# Patient Record
Sex: MALE | Race: WHITE | NOT HISPANIC OR LATINO | Employment: FULL TIME | URBAN - METROPOLITAN AREA
[De-identification: names, ages, dates, MRNs, and addresses within clinical notes are randomized per-mention and may not be internally consistent; named-entity substitution may affect disease eponyms.]

---

## 2018-01-15 NOTE — MISCELLANEOUS
Message  GI Reminder Recall [facility]:   Date: 03/17/2016   Dear Marisa Platt:     Review of our records shows you are due for the following: Colonoscopy  Please call the following office to schedule your appointment:   [address] [facility], [address], [place], [address] [place] [address]  [phone]  We look forward to hearing from you! Sincerely,         Signatures   Electronically signed by :  Katelyn Soriano, ; Mar 17 2016  1:26PM EST                       (Author)

## 2020-12-03 ENCOUNTER — OFFICE VISIT (OUTPATIENT)
Dept: GASTROENTEROLOGY | Facility: CLINIC | Age: 60
End: 2020-12-03
Payer: COMMERCIAL

## 2020-12-03 DIAGNOSIS — Z11.59 SPECIAL SCREENING EXAMINATION FOR VIRAL DISEASE: ICD-10-CM

## 2020-12-03 DIAGNOSIS — Z12.11 COLON CANCER SCREENING: Primary | ICD-10-CM

## 2020-12-03 PROCEDURE — 99204 OFFICE O/P NEW MOD 45 MIN: CPT | Performed by: INTERNAL MEDICINE

## 2020-12-03 RX ORDER — ALBUTEROL SULFATE 90 UG/1
2 AEROSOL, METERED RESPIRATORY (INHALATION) EVERY 4 HOURS PRN
COMMUNITY
Start: 2008-01-23 | End: 2021-03-19

## 2020-12-03 RX ORDER — AMOXICILLIN 500 MG/1
CAPSULE ORAL
COMMUNITY
End: 2021-03-19

## 2021-01-14 ENCOUNTER — TELEPHONE (OUTPATIENT)
Dept: UROLOGY | Facility: MEDICAL CENTER | Age: 61
End: 2021-01-14

## 2021-01-20 ENCOUNTER — TELEPHONE (OUTPATIENT)
Dept: GASTROENTEROLOGY | Facility: CLINIC | Age: 61
End: 2021-01-20

## 2021-01-20 NOTE — TELEPHONE ENCOUNTER
Pt has been rescheduled to 3/24/21 pr request  Elex Pencil date is 3/18/21   Pt expressed understanding

## 2021-01-20 NOTE — TELEPHONE ENCOUNTER
Patients GI provider:  Dr Sang Quevedo    Number to return call: [phone]     Reason for call: Pt calling to r/s his procedure    Scheduled procedure/appointment date if applicable: Procedure - 02/04/21

## 2021-03-17 ENCOUNTER — TELEPHONE (OUTPATIENT)
Dept: GASTROENTEROLOGY | Facility: AMBULARY SURGERY CENTER | Age: 61
End: 2021-03-17

## 2021-03-17 DIAGNOSIS — Z12.11 COLON CANCER SCREENING: Primary | ICD-10-CM

## 2021-03-17 DIAGNOSIS — Z11.59 SPECIAL SCREENING EXAMINATION FOR VIRAL DISEASE: ICD-10-CM

## 2021-03-17 PROCEDURE — U0005 INFEC AGEN DETEC AMPLI PROBE: HCPCS

## 2021-03-17 PROCEDURE — U0003 INFECTIOUS AGENT DETECTION BY NUCLEIC ACID (DNA OR RNA); SEVERE ACUTE RESPIRATORY SYNDROME CORONAVIRUS 2 (SARS-COV-2) (CORONAVIRUS DISEASE [COVID-19]), AMPLIFIED PROBE TECHNIQUE, MAKING USE OF HIGH THROUGHPUT TECHNOLOGIES AS DESCRIBED BY CMS-2020-01-R: HCPCS

## 2021-03-17 NOTE — TELEPHONE ENCOUNTER
LVM that new prep has been sent to his pharmacy and will email new prep instructions for Clenpiq  Gave direct line if has any questions

## 2021-03-17 NOTE — TELEPHONE ENCOUNTER
Patients GI provider:  Dr Christo Lincoln     Number to return call: [phone]    Reason for call: Lien He from [id number] called stated suprep is not covered if ok to switch to Clenpiq       Scheduled procedure/appointment date if applicable: Apt/procedure 3/24/21

## 2021-03-18 LAB — SARS-COV-2 RNA RESP QL NAA+PROBE: NEGATIVE

## 2021-03-19 DIAGNOSIS — Z12.11 COLON CANCER SCREENING: ICD-10-CM

## 2021-03-19 PROCEDURE — U0003 INFECTIOUS AGENT DETECTION BY NUCLEIC ACID (DNA OR RNA); SEVERE ACUTE RESPIRATORY SYNDROME CORONAVIRUS 2 (SARS-COV-2) (CORONAVIRUS DISEASE [COVID-19]), AMPLIFIED PROBE TECHNIQUE, MAKING USE OF HIGH THROUGHPUT TECHNOLOGIES AS DESCRIBED BY CMS-2020-01-R: HCPCS | Performed by: INTERNAL MEDICINE

## 2021-03-19 PROCEDURE — U0005 INFEC AGEN DETEC AMPLI PROBE: HCPCS | Performed by: INTERNAL MEDICINE

## 2021-03-19 NOTE — PRE-PROCEDURE INSTRUCTIONS
Pre-Surgery Instructions:   Medication Instructions    Ibuprofen (Advil) 200 MG CAPS Pt aware to avoid prior to the procedure    Pt to follow Dr Daniel Simmons instructions  Pt to have the repeat covid test today-3/19/21 at the [facility]    will be his daughter Matrha Montague [phone]

## 2021-03-20 LAB — SARS-COV-2 RNA RESP QL NAA+PROBE: NEGATIVE

## 2021-03-22 RX ORDER — SODIUM PICOSULFATE, MAGNESIUM OXIDE, AND ANHYDROUS CITRIC ACID 10; 3.5; 12 MG/160ML; G/160ML; G/160ML
1 LIQUID ORAL ONCE
Qty: 1 BOTTLE | Refills: 0 | Status: SHIPPED | OUTPATIENT
Start: 2021-03-22 | End: 2021-03-22

## 2021-03-23 ENCOUNTER — ANESTHESIA EVENT (OUTPATIENT)
Dept: GASTROENTEROLOGY | Facility: AMBULARY SURGERY CENTER | Age: 61
End: 2021-03-23

## 2021-03-24 ENCOUNTER — ANESTHESIA (OUTPATIENT)
Dept: GASTROENTEROLOGY | Facility: AMBULARY SURGERY CENTER | Age: 61
End: 2021-03-24

## 2021-03-24 ENCOUNTER — HOSPITAL ENCOUNTER (OUTPATIENT)
Dept: GASTROENTEROLOGY | Facility: AMBULARY SURGERY CENTER | Age: 61
Setting detail: OUTPATIENT SURGERY
Discharge: HOME | End: 2021-03-24
Attending: INTERNAL MEDICINE | Admitting: INTERNAL MEDICINE
Payer: COMMERCIAL

## 2021-03-24 VITALS
TEMPERATURE: 96.5 F | HEIGHT: 69 IN | HEART RATE: 48 BPM | WEIGHT: 195 LBS | OXYGEN SATURATION: 98 % | RESPIRATION RATE: 16 BRPM | BODY MASS INDEX: 28.88 KG/M2 | DIASTOLIC BLOOD PRESSURE: 65 MMHG | SYSTOLIC BLOOD PRESSURE: 145 MMHG

## 2021-03-24 DIAGNOSIS — Z12.11 COLON CANCER SCREENING: Primary | ICD-10-CM

## 2021-03-24 PROCEDURE — 88305 TISSUE EXAM BY PATHOLOGIST: CPT | Performed by: PATHOLOGY

## 2021-03-24 PROCEDURE — 45380 COLONOSCOPY AND BIOPSY: CPT | Performed by: INTERNAL MEDICINE

## 2021-03-24 PROCEDURE — 45385 COLONOSCOPY W/LESION REMOVAL: CPT | Performed by: INTERNAL MEDICINE

## 2021-03-24 RX ORDER — PROPOFOL 10 MG/ML
INJECTION, EMULSION INTRAVENOUS CONTINUOUS PRN
Status: DISCONTINUED | OUTPATIENT
Start: 2021-03-24 | End: 2021-03-24

## 2021-03-24 RX ORDER — LIDOCAINE HYDROCHLORIDE 10 MG/ML
INJECTION, SOLUTION EPIDURAL; INFILTRATION; INTRACAUDAL; PERINEURAL AS NEEDED
Status: DISCONTINUED | OUTPATIENT
Start: 2021-03-24 | End: 2021-03-24

## 2021-03-24 RX ORDER — PROPOFOL 10 MG/ML
INJECTION, EMULSION INTRAVENOUS AS NEEDED
Status: DISCONTINUED | OUTPATIENT
Start: 2021-03-24 | End: 2021-03-24

## 2021-03-24 RX ORDER — SODIUM CHLORIDE, SODIUM LACTATE, POTASSIUM CHLORIDE, CALCIUM CHLORIDE 600; 310; 30; 20 MG/100ML; MG/100ML; MG/100ML; MG/100ML
125 INJECTION, SOLUTION INTRAVENOUS CONTINUOUS
Status: DISCONTINUED | OUTPATIENT
Start: 2021-03-24 | End: 2021-03-28 | Stop reason: HOSPADM

## 2021-03-24 RX ORDER — ONDANSETRON 2 MG/ML
4 INJECTION INTRAMUSCULAR; INTRAVENOUS ONCE AS NEEDED
Status: CANCELLED | OUTPATIENT
Start: 2021-03-24

## 2021-03-24 RX ADMIN — LIDOCAINE HYDROCHLORIDE 50 MG: 10 INJECTION, SOLUTION EPIDURAL; INFILTRATION; INTRACAUDAL; PERINEURAL at 12:31

## 2021-03-24 RX ADMIN — PROPOFOL 100 MG: 10 INJECTION, EMULSION INTRAVENOUS at 12:31

## 2021-03-24 RX ADMIN — PROPOFOL 100 MCG/KG/MIN: 10 INJECTION, EMULSION INTRAVENOUS at 12:31

## 2021-03-24 RX ADMIN — SODIUM CHLORIDE, SODIUM LACTATE, POTASSIUM CHLORIDE, AND CALCIUM CHLORIDE 125 ML/HR: .6; .31; .03; .02 INJECTION, SOLUTION INTRAVENOUS at 11:15

## 2021-03-24 NOTE — ANESTHESIA PREPROCEDURE EVALUATION
Procedure:  COLONOSCOPY    Relevant Problems   ANESTHESIA (within normal limits)      CARDIO (within normal limits)      ENDO (within normal limits)      PULMONARY (within normal limits)        Physical Exam    Airway    Mallampati score: II  TM Distance: >3 FB  Neck ROM: full     Dental   No notable dental hx     Cardiovascular  Cardiovascular exam normal    Pulmonary  Pulmonary exam normal     Other Findings        Anesthesia Plan  ASA Score- 1     Anesthesia Type- IV sedation with anesthesia with ASA Monitors  Additional Monitors:   Airway Plan:           Plan Factors-Exercise tolerance (METS): >4 METS  Chart reviewed  Existing labs reviewed  Patient summary reviewed  Patient is not a current smoker  Induction- intravenous  Postoperative Plan-     Informed Consent- Anesthetic plan and risks discussed with patient  I personally reviewed this patient with the CRNA  Discussed and agreed on the Anesthesia Plan with the CRNA  Angy Rodgers

## 2021-03-24 NOTE — PERIOPERATIVE NURSING NOTE
Pt d/c to home at this time w/ Daughter Mariel Waldron  Via: Walking  Pt left with all belongings  Iv was D/C intact with dry sterile dressing  Encouraged to keep follow up appointments, Verbalized understanding  D/C instructions reviewed and explained  Verbalized understanding

## 2021-03-24 NOTE — H&P
History and Physical - [facility]  Fartun Fontaine 63 y o  male MRN: [id number]    HPI: Fartun Fontaine is a 63y o  year old male who presents for colon cancer screening  Review of Systems    Historical Information   Past Medical History:   Diagnosis Date    Arthritis     knees    Colon polyp     Extrinsic asthma      Past Surgical History:   Procedure Laterality Date    BILATERAL HIP ARTHROSCOPY      resurfaced    COLONOSCOPY      HERNIA REPAIR Bilateral     age 4yr   Stephanie Mancuso KNEE ARTHROSCOPY Bilateral     meniscus repair    LASIK      TONSILLECTOMY      WISDOM TOOTH EXTRACTION       Social History   Social History     Substance and Sexual Activity   Alcohol Use Yes    Frequency: 2-4 times a month    Comment: social     Social History     Substance and Sexual Activity   Drug Use Not Currently     Social History     Tobacco Use   Smoking Status Former Smoker   Smokeless Tobacco Never Used   Tobacco Comment    quit 28 yrs ago     Family History   Problem Relation Age of Onset    Bone cancer Father     Prostate cancer Father     Cancer Father         prostate w/mets    COPD Mother         emphysema-smoker       Meds/Allergies     (Not in a hospital admission)      No Known Allergies    Objective     /72   Pulse (!) 52   Temp (!) 96 5 °F (35 8 °C) (Tympanic)   Resp 16   Ht 5' 9" (1 753 m)   Wt 88 5 kg (195 lb)   SpO2 98%   BMI 28 80 kg/m²       PHYSICAL EXAM    Gen: NAD  CV: RRR  CHEST: Clear  ABD: soft, NT/ND  EXT: no edema  Neuro: AAO      ASSESSMENT/PLAN:  This is a 63y o  year old male here for colon cancer screening      PLAN:   Procedure:  Colonoscopy

## 2021-03-24 NOTE — ANESTHESIA POSTPROCEDURE EVALUATION
Post-Op Assessment Note    CV Status:  Stable  Pain Score: 0    Pain management: adequate     Mental Status:  Awake   Hydration Status:  Stable   PONV Controlled:  None   Airway Patency:  Patent      Post Op Vitals Reviewed: Yes      Staff: CRNA         No complications documented      BP      Temp     Pulse    Resp      SpO2

## 2021-03-31 ENCOUNTER — OFFICE VISIT (OUTPATIENT)
Dept: FAMILY MEDICINE CLINIC | Facility: CLINIC | Age: 61
End: 2021-03-31
Payer: COMMERCIAL

## 2021-03-31 VITALS
OXYGEN SATURATION: 98 % | HEART RATE: 58 BPM | SYSTOLIC BLOOD PRESSURE: 132 MMHG | RESPIRATION RATE: 16 BRPM | HEIGHT: 69 IN | TEMPERATURE: 97 F | BODY MASS INDEX: 28.79 KG/M2 | DIASTOLIC BLOOD PRESSURE: 70 MMHG | WEIGHT: 194.4 LBS

## 2021-03-31 DIAGNOSIS — Z11.59 NEED FOR HEPATITIS C SCREENING TEST: ICD-10-CM

## 2021-03-31 DIAGNOSIS — Z90.79 S/P ORCHIECTOMY: ICD-10-CM

## 2021-03-31 DIAGNOSIS — Z13.6 SCREENING FOR CARDIOVASCULAR CONDITION: ICD-10-CM

## 2021-03-31 DIAGNOSIS — Z00.00 WELL ADULT EXAM: Primary | ICD-10-CM

## 2021-03-31 DIAGNOSIS — N52.9 ORGANIC IMPOTENCE: ICD-10-CM

## 2021-03-31 DIAGNOSIS — Z23 NEED FOR VACCINATION: ICD-10-CM

## 2021-03-31 DIAGNOSIS — Z12.5 SCREENING FOR PROSTATE CANCER: ICD-10-CM

## 2021-03-31 DIAGNOSIS — Z11.4 SCREENING FOR HIV (HUMAN IMMUNODEFICIENCY VIRUS): ICD-10-CM

## 2021-03-31 PROBLEM — M16.10 PRIMARY LOCALIZED OSTEOARTHROSIS OF PELVIC REGION: Status: ACTIVE | Noted: 2021-03-31

## 2021-03-31 PROBLEM — M17.10 OSTEOARTHRITIS OF KNEE: Status: ACTIVE | Noted: 2021-03-31

## 2021-03-31 PROBLEM — M17.9 OSTEOARTHRITIS OF KNEE: Status: ACTIVE | Noted: 2021-03-31

## 2021-03-31 PROBLEM — Z12.11 COLON CANCER SCREENING: Status: RESOLVED | Noted: 2020-12-03 | Resolved: 2021-03-31

## 2021-03-31 PROCEDURE — 99386 PREV VISIT NEW AGE 40-64: CPT | Performed by: FAMILY MEDICINE

## 2021-03-31 PROCEDURE — 3725F SCREEN DEPRESSION PERFORMED: CPT | Performed by: FAMILY MEDICINE

## 2021-03-31 PROCEDURE — 3008F BODY MASS INDEX DOCD: CPT | Performed by: FAMILY MEDICINE

## 2021-03-31 NOTE — PATIENT INSTRUCTIONS
Weight Management   AMBULATORY CARE:   Why it is important to manage your weight:  Being overweight increases your risk of health conditions such as heart disease, high blood pressure, type 2 diabetes, and certain types of cancer  It can also increase your risk for osteoarthritis, sleep apnea, and other respiratory problems  Aim for a slow, steady weight loss  Even a small amount of weight loss can lower your risk of health problems  How to lose weight safely:  A safe and healthy way to lose weight is to eat fewer calories and get regular exercise  · You can lose up about 1 pound a week by decreasing the number of calories you eat by 500 calories each day  You can decrease calories by eating smaller portion sizes or by cutting out high-calorie foods  Read labels to find out how many calories are in the foods you eat  · You can also burn calories with exercise such as walking, swimming, or biking  You will be more likely to keep weight off if you make these changes part of your lifestyle  Exercise at least 30 minutes per day on most days of the week  You can also fit in more physical activity by taking the stairs instead of the elevator or parking farther away from stores  Ask your healthcare provider about the best exercise plan for you  Healthy meal plan for weight management:  A healthy meal plan includes a variety of foods, contains fewer calories, and helps you stay healthy  A healthy meal plan includes the following:     · Eat whole-grain foods more often  A healthy meal plan should contain fiber  Fiber is the part of grains, fruits, and vegetables that is not broken down by your body  Whole-grain foods are healthy and provide extra fiber in your diet  Some examples of whole-grain foods are whole-wheat breads and pastas, oatmeal, brown rice, and bulgur  · Eat a variety of vegetables every day  Include dark, leafy greens such as spinach, kale, ana m greens, and mustard greens   Eat yellow and orange vegetables such as carrots, sweet potatoes, and winter squash  · Eat a variety of fruits every day  Choose fresh or canned fruit (canned in its own juice or light syrup) instead of juice  Fruit juice has very little or no fiber  · Eat low-fat dairy foods  Drink fat-free (skim) milk or 1% milk  Eat fat-free yogurt and low-fat cottage cheese  Try low-fat cheeses such as mozzarella and other reduced-fat cheeses  · Choose meat and other protein foods that are low in fat  Choose beans or other legumes such as split peas or lentils  Choose fish, skinless poultry (chicken or turkey), or lean cuts of red meat (beef or pork)  Before you cook meat or poultry, cut off any visible fat  · Use less fat and oil  Try baking foods instead of frying them  Add less fat, such as margarine, sour cream, regular salad dressing and mayonnaise to foods  Eat fewer high-fat foods  Some examples of high-fat foods include french fries, doughnuts, ice cream, and cakes  · Eat fewer sweets  Limit foods and drinks that are high in sugar  This includes candy, cookies, regular soda, and sweetened drinks  Ways to decrease calories:   · Eat smaller portions  ? Use a small plate with smaller servings  ? Do not eat second helpings  ? When you eat at a restaurant, ask for a box and place half of your meal in the box before you eat  ? Share an entrée with someone else  · Replace high-calorie snacks with healthy, low-calorie snacks  ? Choose fresh fruit, vegetables, fat-free rice cakes, or air-popped popcorn instead of potato chips, nuts, or chocolate  ? Choose water or calorie-free drinks instead of soda or sweetened drinks  · Do not shop for groceries when you are hungry  You may be more likely to make unhealthy food choices  Take a grocery list of healthy foods and shop after you have eaten  · Eat regular meals  Do not skip meals  Skipping meals can lead to overeating later in the day   This can make it harder for you to lose weight  Eat a healthy snack in place of a meal if you do not have time to eat a regular meal  Talk with a dietitian to help you create a meal plan and schedule that is right for you  Other things to consider as you try to lose weight:   · Be aware of situations that may give you the urge to overeat, such as eating while watching television  Find ways to avoid these situations  For example, read a book, go for a walk, or do crafts  · Meet with a weight loss support group or friends who are also trying to lose weight  This may help you stay motivated to continue working on your weight loss goals  © Copyright [address] Information is for End User's use only and may not be sold, redistributed or otherwise used for commercial purposes  All illustrations and images included in CareNotes® are the copyrighted property of [employer]  or [facility] Farnaz Muñiz   The above information is an  only  It is not intended as medical advice for individual conditions or treatments  Talk to your doctor, nurse or pharmacist before following any medical regimen to see if it is safe and effective for you

## 2021-03-31 NOTE — PROGRESS NOTES
FNutrition: Stressed importance of a well balanced diet, moderation of sodium/saturated fat, caloric balance and sufficient intake of fiber  Exercise: Stressed the importance of regular exercise with a goal of 150 minutes per week  Dental Health: Discussed daily flossing and brushing and regular dental visits [address]    NAME: Allie Cruz  AGE: 63 y o  SEX: male  : 1960     DATE: 3/31/2021    Assessment and Plan     1  Well adult exam    2  Need for vaccination    3  BMI 28 0-28 9,adult    4  Screening for cardiovascular condition  -     Comprehensive metabolic panel; Future  -     Lipid Panel with Direct LDL reflex; Future    5  Screening for prostate cancer  -     PSA, Total Screen; Future    6  Need for hepatitis C screening test  -     Hepatitis C antibody; Future    7  Screening for HIV (human immunodeficiency virus)  -     [facility]- Human Immunodeficiency Virus 1/2 Antigen / Antibody ( Fourth Generation) with Reflex Testing; Future    8  Organic impotence  -     Testosterone, free, total; Future    9  S/P orchiectomy  -     Testosterone, free, total; Future        · Patient Counseling:   · Nutrition: Stressed importance of a well balanced diet, moderation of sodium/saturated fat, caloric balance and sufficient intake of fiber  · Exercise: Stressed the importance of regular exercise with a goal of 150 minutes per week  · Dental Health: Discussed daily flossing and brushing and regular dental visits     · Immunizations reviewed: Up To Date  · Discussed benefits of:  Colon Cancer Screening, Prostate Cancer Screening  and Screening labs   BMI Counseling: Body mass index is 28 71 kg/m²  Discussed with patient's BMI with him  The BMI is above normal  Exercise recommendations include moderate aerobic physical activity for 150 minutes/week  No follow-ups on file          Chief Complaint     Chief Complaint   Patient presents with    Physical Exam     New patient       History of Present Illness     Pt is here for afull physical  Here for the first time          Well Adult Physical   Patient here for a comprehensive physical exam       Diet and Physical Activity  Diet: well balanced diet  Exercise: frequently      Depression Screen  PHQ-9 Depression Screening    PHQ-9:   Frequency of the following problems over the past two weeks:      Little interest or pleasure in doing things: 0 - not at all  Feeling down, depressed, or hopeless: 0 - not at all  PHQ-2 Score: 0          General Health  Hearing: Normal:  bilateral  Vision: previous LASIK surgery  Dental: regular dental visits    Reproductive Health  No issues       The following portions of the patient's history were reviewed and updated as appropriate: allergies, current medications, past family history, past medical history, past social history, past surgical history and problem list     Review of Systems     Review of Systems   Constitutional: Negative for activity change, appetite change, chills, diaphoresis, fatigue, fever and unexpected weight change  HENT: Negative for congestion, dental problem, ear pain, mouth sores, sinus pressure, sinus pain, sore throat and trouble swallowing  Eyes: Negative for photophobia, discharge and itching  Respiratory: Negative for apnea, chest tightness and shortness of breath  Cardiovascular: Negative for chest pain, palpitations and leg swelling  Gastrointestinal: Negative for abdominal distention, abdominal pain, blood in stool, nausea and vomiting  Endocrine: Negative for cold intolerance, heat intolerance, polydipsia, polyphagia and polyuria  Genitourinary: Negative for difficulty urinating  Musculoskeletal: Negative for arthralgias  Skin: Negative for color change and wound  Neurological: Negative for dizziness, syncope, speech difficulty and headaches     Hematological: Negative for adenopathy  Psychiatric/Behavioral: Negative for agitation and behavioral problems         Past Medical History     Past Medical History:   Diagnosis Date    Arthritis     knees    Colon polyp     Extrinsic asthma     Undescended right testis        Past Surgical History     Past Surgical History:   Procedure Laterality Date    BILATERAL HIP ARTHROSCOPY      resurfaced    COLONOSCOPY      HERNIA REPAIR Bilateral     age 4yr   Schroeder KNEE ARTHROSCOPY Bilateral     meniscus repair    LASIK      ORCHIECTOMY      TONSILLECTOMY      WISDOM TOOTH EXTRACTION         Social History     Social History     Socioeconomic History    Marital status: Single     Spouse name: None    Number of children: None    Years of education: None    Highest education level: None   Occupational History    None   Social Needs    Financial resource strain: None    Food insecurity     Worry: None     Inability: None    Transportation needs     Medical: None     Non-medical: None   Tobacco Use    Smoking status: Former Smoker     Quit date: 1982     Years since quittin 2    Smokeless tobacco: Never Used    Tobacco comment: quit 35 yrs ago   Substance and Sexual Activity    Alcohol use: Yes     Frequency: 2-4 times a month     Comment: social    Drug use: Not Currently    Sexual activity: None   Lifestyle    Physical activity     Days per week: None     Minutes per session: None    Stress: None   Relationships    Social connections     Talks on phone: None     Gets together: None     Attends Orthodox service: None     Active member of club or organization: None     Attends meetings of clubs or organizations: None     Relationship status: None    Intimate partner violence     Fear of current or ex partner: None     Emotionally abused: None     Physically abused: None     Forced sexual activity: None   Other Topics Concern    None   Social History Narrative    None       Family History     Family History   Problem Relation Age of Onset    Bone cancer Father     Prostate cancer Father     Cancer Father         prostate w/mets    COPD Mother         emphysema-smoker    Hypertension Mother     Mental illness Neg Hx        Current Medications       Current Outpatient Medications:     Ibuprofen (Advil) 200 MG CAPS, as needed , Disp: , Rfl:      Allergies     No Known Allergies    Objective     /70   Pulse 58   Temp (!) 97 °F (36 1 °C)   Resp 16   Ht 5' 9" (1 753 m)   Wt 88 2 kg (194 lb 6 4 oz)   SpO2 98%   BMI 28 71 kg/m²      Physical Exam  Vitals signs and nursing note reviewed  Constitutional:       General: He is not in acute distress  Appearance: He is well-developed  He is not diaphoretic  HENT:      Head: Normocephalic and atraumatic  Right Ear: External ear normal       Left Ear: External ear normal       Nose: Nose normal       Mouth/Throat:      Pharynx: No oropharyngeal exudate  Eyes:      General: No scleral icterus  Right eye: No discharge  Left eye: No discharge  Pupils: Pupils are equal, round, and reactive to light  Neck:      Thyroid: No thyromegaly  Cardiovascular:      Rate and Rhythm: Normal rate  Heart sounds: Normal heart sounds  No murmur  Pulmonary:      Effort: Pulmonary effort is normal  No respiratory distress  Breath sounds: Normal breath sounds  No wheezing  Abdominal:      General: Bowel sounds are normal  There is no distension  Palpations: Abdomen is soft  There is no mass  Tenderness: There is no abdominal tenderness  There is no guarding or rebound  Genitourinary:     Prostate: Normal    Musculoskeletal: Normal range of motion  Skin:     General: Skin is warm and dry  Findings: No erythema or rash  Neurological:      Mental Status: He is alert        Coordination: Coordination normal       Deep Tendon Reflexes: Reflexes normal    Psychiatric:         Behavior: Behavior normal  Visual Acuity Screening    Right eye Left eye Both eyes   Without correction: 20/30 20/25 20/25   With correction:              Shannon Crandall, DO  [address]  BMI Counseling: Body mass index is 28 71 kg/m²  The BMI is above normal  Nutrition recommendations include reducing portion sizes

## 2021-04-15 LAB
ALBUMIN SERPL-MCNC: 4.3 G/DL (ref 3.8–4.8)
ALBUMIN/GLOB SERPL: 1.9 {RATIO} (ref 1.2–2.2)
ALP SERPL-CCNC: 74 IU/L (ref 39–117)
ALT SERPL-CCNC: 17 IU/L (ref 0–44)
AST SERPL-CCNC: 21 IU/L (ref 0–40)
BILIRUB SERPL-MCNC: 0.7 MG/DL (ref 0–1.2)
BUN SERPL-MCNC: 10 MG/DL (ref 8–27)
BUN/CREAT SERPL: 10 (ref 10–24)
CALCIUM SERPL-MCNC: 9.6 MG/DL (ref 8.6–10.2)
CHLORIDE SERPL-SCNC: 103 MMOL/L (ref 96–106)
CHOLEST SERPL-MCNC: 215 MG/DL (ref 100–199)
CO2 SERPL-SCNC: 23 MMOL/L (ref 20–29)
CREAT SERPL-MCNC: 0.98 MG/DL (ref 0.76–1.27)
GLOBULIN SER-MCNC: 2.3 G/DL (ref 1.5–4.5)
GLUCOSE SERPL-MCNC: 95 MG/DL (ref 65–99)
HCV AB S/CO SERPL IA: <0.1 S/CO RATIO (ref 0–0.9)
HDLC SERPL-MCNC: 39 MG/DL
HIV 1+2 AB+HIV1 P24 AG SERPL QL IA: NON REACTIVE
LDLC SERPL CALC-MCNC: 144 MG/DL (ref 0–99)
MICRODELETION SYND BLD/T FISH: NORMAL
POTASSIUM SERPL-SCNC: 4.9 MMOL/L (ref 3.5–5.2)
PROT SERPL-MCNC: 6.6 G/DL (ref 6–8.5)
PSA SERPL-MCNC: 3.7 NG/ML (ref 0–4)
SL AMB EGFR AFRICAN AMERICAN: 96 ML/MIN/1.73
SL AMB EGFR NON AFRICAN AMERICAN: 83 ML/MIN/1.73
SODIUM SERPL-SCNC: 138 MMOL/L (ref 134–144)
TRIGL SERPL-MCNC: 175 MG/DL (ref 0–149)

## 2021-08-25 ENCOUNTER — AMB VIDEO VISIT (OUTPATIENT)
Dept: OTHER | Facility: HOSPITAL | Age: 61
End: 2021-08-25

## 2021-08-25 PROCEDURE — ECARE PR SL URGENT CARE VIRTUAL VISIT: Performed by: FAMILY MEDICINE

## 2021-08-25 NOTE — CARE ANYWHERE EVISITS
Visit Summary for Jules Fisher - Gender: Male - Date of Birth: [id number]  Date: [id number] - Duration: 6 minutes  Patient: Jules Fisher  Provider: Abby Ramos    Patient Contact Information  Address  [place]  [phone]    Visit Topics  Tick or spider bite on leg [Added By: Self - 2021-08-25]    Triage Questions   What is your current physical address in the event of a medical emergency? Answer []  Are you allergic to any medications? Answer []  Are you now or could you be pregnant? Answer []  Do you have any immune system compromise or chronic lung   disease? Answer []  Do you have any vulnerable family members in the home (infant, pregnant, cancer, elderly)? Answer []     Conversation Transcripts  [0A][0A] [Notification] Westley Vargas, Global Staff, will help you prepare for your visit  She is assisting Johnny Vazquez Family Physician [0A][Saumya Navarro] Lan, and thank you for connecting  While you are waiting for the doctor, are there any questions I   can answer for you about our service? Please contact customer service if you have questions about billing, insurance, or technical issues  Visits work best with a stable WiFi connection, so please make sure you are connected before we   begin [0A][Notification] Westley Vargas has left the room  [0A][Notification] You are connected with Abby Ramos Family Physician [0A][Notification] Jules Fisher is located in [place]  [0A][Notification] Jules Fisher has shared health   history  Iraidaeugenia Gottlieb  [0A][Notification] Abby Ramos has added a prescription  [0A]    Diagnosis  Furuncle of left lower limb    Procedures  Value: [id number] Code: CPT-4 UNLISTED E&M SERVICE    Medications Prescribed    cephalexin  Dose : 1 capsule  Route : oral  Frequency : every 12 hours       Refills : 0  Instructions to the Pharmacist : Substitutions allowed    mupirocin      Frequency :   Patient Instructions : Apply to affected area 2 times a day until healed  Refills : 0  Instructions to the Pharmacist : Substitutions allowed      Provider Notes  [0A][0A] We strongly encourage you to share the[0A]following record of today's visit with your primary care physician  [0A][0A][0A][0A]Contact phone number: see intake[0A][0A]Mode of Communication: video[0A][place] state, name and    confirmed[0A][0A][0A][0A]HPI:   62 yo male  states that 4 days ago noticed a pimple on his left upper medial thigh> he thought ingrown hair or a bite  It expanded in redness and size and pain  Used warm compresses and last night able to get a bit of   pus out  He states today it is still warm to touch and tender, better today overall in size and less swelling  No fever  He thought maybe fever the other day, felt weak  PMH: none[0A][0A]PSH: none recent[0A][0A]Meds: none[0A][0A]Allergies:   nkda[0A]nonsmoker[0A]covid vaccine - yes[0A]Exam: [0A][0A]Gen: Alert, normal mental status and interaction, no visible distress, non-[0A]toxic appearance  left medial upper thigh with 4cm x 10cm angry deep patch of erythema with central open   furuncle[0A][0A][0A][0A]Assessment: L02 426 Furuncle of left lower limb, medial left thigh[0A]Plan: [0A][0A]1  I am sending you a prescription as described below  cephelexin bid x 10 daysmuprirocin ointment [0A][0A]2  Discussed precautions  Wash only   with water  Keep covered if draining or open  otc pain relievers according to label[0A][0A][0A][0A]Follow up:[0A][0A]1  If there are any questions or problems with the prescription, call[0A][phone] anytime for assistance  [0A][0A]2  Please   re-connect for another online visit or see an in-person provider[0A]should your symptoms worsen or persist  [0A][0A]3  Taking a probiotic (either in pill form or by eating yogurt that contains[0A]probiotics) while using antibiotics can help prevent some   of the troublesome[0A]side effects that antibiotics can sometimes cause [0A][0A]4   Please print a copy of this note and send it to your regular doctor, or take[0A]it to your next visit so it may be included in your medical record  [0A][0A][0A][0A]Patient   voiced understanding and agrees to plan [0A][0A][0A][0A]Please see your PCP on an annual basis  [0A]    Electronically signed by: Lee Fuentes [phone])

## 2021-08-27 ENCOUNTER — OFFICE VISIT (OUTPATIENT)
Dept: URGENT CARE | Facility: CLINIC | Age: 61
End: 2021-08-27
Payer: COMMERCIAL

## 2021-08-27 VITALS
TEMPERATURE: 98.3 F | BODY MASS INDEX: 29.33 KG/M2 | DIASTOLIC BLOOD PRESSURE: 74 MMHG | WEIGHT: 198 LBS | HEIGHT: 69 IN | HEART RATE: 67 BPM | OXYGEN SATURATION: 98 % | SYSTOLIC BLOOD PRESSURE: 135 MMHG | RESPIRATION RATE: 18 BRPM

## 2021-08-27 DIAGNOSIS — L02.416 ABSCESS OF LEFT THIGH: Primary | ICD-10-CM

## 2021-08-27 PROCEDURE — 99213 OFFICE O/P EST LOW 20 MIN: CPT | Performed by: PHYSICIAN ASSISTANT

## 2021-08-27 RX ORDER — SULFAMETHOXAZOLE AND TRIMETHOPRIM 800; 160 MG/1; MG/1
1 TABLET ORAL EVERY 12 HOURS SCHEDULED
Qty: 20 TABLET | Refills: 0 | Status: SHIPPED | OUTPATIENT
Start: 2021-08-27 | End: 2021-09-06

## 2021-08-27 RX ORDER — CEPHALEXIN 500 MG/1
500 CAPSULE ORAL EVERY 6 HOURS SCHEDULED
COMMUNITY
End: 2021-09-09 | Stop reason: ALTCHOICE

## 2021-08-27 NOTE — PATIENT INSTRUCTIONS
Abscess   WHAT YOU NEED TO KNOW:   A warm compress may help your abscess drain  Your healthcare provider may make a cut in the abscess so it can drain  You may need surgery to remove an abscess that is on your hands or buttocks  DISCHARGE INSTRUCTIONS:   Return to the emergency department if:   · The area around your abscess becomes very painful, warm, or has red streaks  · You have a fever and chills  · Your heart is beating faster than usual     · You feel faint or confused  Call your doctor if:   · Your abscess gets bigger or does not get better  · Your abscess returns  · You have questions or concerns about your condition or care  Medicines: You may  need any of the following:  · Antibiotics  help treat a bacterial infection  · Acetaminophen  decreases pain and fever  It is available without a doctor's order  Ask how much to take and how often to take it  Follow directions  Read the labels of all other medicines you are using to see if they also contain acetaminophen, or ask your doctor or pharmacist  Acetaminophen can cause liver damage if not taken correctly  Do not use more than 4 grams (4,000 milligrams) total of acetaminophen in one day  · NSAIDs , such as ibuprofen, help decrease swelling, pain, and fever  This medicine is available with or without a doctor's order  NSAIDs can cause stomach bleeding or kidney problems in certain people  If you take blood thinner medicine, always ask your healthcare provider if NSAIDs are safe for you  Always read the medicine label and follow directions  · Take your medicine as directed  Contact your healthcare provider if you think your medicine is not helping or if you have side effects  Tell him or her if you are allergic to any medicine  Keep a list of the medicines, vitamins, and herbs you take  Include the amounts, and when and why you take them  Bring the list or the pill bottles to follow-up visits   Carry your medicine list with you in case of an emergency  Self-care:   · Apply a warm compress to your abscess  This will help it open and drain  Wet a washcloth in warm, but not hot, water  Apply the compress for 10 minutes  Repeat this 4 times each day  Do not  press on an abscess or try to open it with a needle  You may push the bacteria deeper or into your blood  · Do not share your clothes, towels, or sheets with anyone  This can spread the infection to others  · Wash your hands often  This can help prevent the spread of germs  Use soap and water or an alcohol-based hand rub  Care for your wound after it is drained:   · Care for your wound as directed  If your healthcare provider says it is okay, carefully remove the bandage and gauze packing  You may need to soak the gauze to get it out of your wound  Clean your wound and the area around it as directed  Dry the area and put on new, clean bandages  Change your bandages when they get wet or dirty  · Ask your healthcare provider how to change the gauze in your wound  Keep track of how many pieces of gauze are placed inside the wound  Do not put too much packing in the wound  Do not pack the gauze too tightly in your wound  Follow up with your healthcare provider in 1 to 3 days: You may need to have your packing removed or your bandage changed  Write down your questions so you remember to ask them during your visits  © [employer] 2021 Information is for End User's use only and may not be sold, redistributed or otherwise used for commercial purposes  All illustrations and images included in CareNotes® are the copyrighted property of [employer]  or [facility] Farnaz Muñiz   The above information is an  only  It is not intended as medical advice for individual conditions or treatments  Talk to your doctor, nurse or pharmacist before following any medical regimen to see if it is safe and effective for you

## 2021-08-27 NOTE — PROGRESS NOTES
[facility] [facility]        NAME: Melissa Thomas is a 63 y o  male  : 1960    MRN: [id number]  DATE:  2021  TIME: 5:25 PM    Assessment and Plan   Abscess of left thigh [L02 416]  1  Abscess of left thigh  sulfamethoxazole-trimethoprim (BACTRIM DS) 800-160 mg per tablet         Patient Instructions     Patient has persistent abscess of the left thigh despite treatment with Keflex and antibiotic ointment  I will add Bactrim to his regimen so he is cover for both MRSA and non MRSA staph  Recommended warm compresses and otherwise keeping the affected area clean, dressed, dry  Should be re-evaluated if condition continues to persist   Follow up with PCP in 3-5 days  Proceed to  ER if symptoms worsen  Chief Complaint     Chief Complaint   Patient presents with    Recurrent Skin Infections     boil on left upper thich did an telemedicine visit put on antibiotic and ointment states it doesn't look better  Red and swollen          History of Present Illness         Patient presents with what he reports is an abscess of his left thigh which has been undergoing treatment but does not appear to be improving  He was prescribed Keflex and has been applying antibiotic ointment since he had telemedicine visit but it is not decreasing in size  He reports redness, tenderness, and swelling but denies any active bleeding or drainage, fever, chills, red streaking, or any other significant symptoms  Review of Systems   Review of Systems   Constitutional: Negative  Respiratory: Negative  Cardiovascular: Negative  Gastrointestinal: Negative  Genitourinary: Negative  Skin: Positive for wound (  Abscess left thigh)  Current Medications     No current outpatient medications on file      Current Allergies     Allergies as of 2021    (No Known Allergies)            The following portions of the patient's history were reviewed and updated as appropriate: allergies, current medications, past family history, past medical history, past social history, past surgical history and problem list      Past Medical History:   Diagnosis Date    Arthritis     knees    Colon polyp     Extrinsic asthma     Undescended right testis        Past Surgical History:   Procedure Laterality Date    BILATERAL HIP ARTHROSCOPY      resurfaced    COLONOSCOPY      HERNIA REPAIR Bilateral     age 4yr   Gema Darke KNEE ARTHROSCOPY Bilateral     meniscus repair    LASIK      ORCHIECTOMY      TONSILLECTOMY      WISDOM TOOTH EXTRACTION         Family History   Problem Relation Age of Onset    Bone cancer Father     Prostate cancer Father     Cancer Father         prostate w/mets    COPD Mother         emphysema-smoker    Hypertension Mother     Mental illness Neg Hx          Medications have been verified  Objective   /74   Pulse 67   Temp 98 3 °F (36 8 °C)   Resp 18   Ht 5' 9" (1 753 m)   Wt 89 8 kg (198 lb)   SpO2 98%   BMI 29 24 kg/m²   No LMP for male patient  Physical Exam     Physical Exam  Vitals reviewed  Constitutional:       General: He is not in acute distress  Appearance: He is well-developed  Skin:     Comments: Left proximal thigh with nondraining, tender abscess present  No active bleeding  No red streaking proximal to area of concern  Neurological:      Mental Status: He is alert and oriented to person, place, and time

## 2021-08-28 ENCOUNTER — OFFICE VISIT (OUTPATIENT)
Dept: FAMILY MEDICINE CLINIC | Facility: CLINIC | Age: 61
End: 2021-08-28
Payer: COMMERCIAL

## 2021-08-28 VITALS
DIASTOLIC BLOOD PRESSURE: 60 MMHG | HEIGHT: 69 IN | BODY MASS INDEX: 29.18 KG/M2 | WEIGHT: 197 LBS | TEMPERATURE: 97.7 F | HEART RATE: 76 BPM | SYSTOLIC BLOOD PRESSURE: 116 MMHG | RESPIRATION RATE: 16 BRPM

## 2021-08-28 DIAGNOSIS — L02.91 ABSCESS: Primary | ICD-10-CM

## 2021-08-28 PROCEDURE — 10060 I&D ABSCESS SIMPLE/SINGLE: CPT | Performed by: FAMILY MEDICINE

## 2021-08-28 PROCEDURE — 99213 OFFICE O/P EST LOW 20 MIN: CPT | Performed by: FAMILY MEDICINE

## 2021-08-28 PROCEDURE — 3008F BODY MASS INDEX DOCD: CPT | Performed by: FAMILY MEDICINE

## 2021-08-28 RX ORDER — AMOXICILLIN 500 MG/1
CAPSULE ORAL
COMMUNITY
End: 2021-09-16

## 2021-08-28 NOTE — PROGRESS NOTES
Assessment/Plan:    1  Abscess            There are no Patient Instructions on file for this visit  Return in about 10 days (around 9/7/2021) for Recheck  Subjective:      Patient ID: Makayla Foster is a 63 y o  male  Chief Complaint   Patient presents with    Recurrent Skin Infections     ac/lpn       Pt being seen for a  urgent care follow up for an abscess on his leg    Pt states he started with a pimple on left inner thigh, few days later it was the size of a football  States he did a tele visit - he was given an abx with a cream  Pt states he figured he was off yesterday and went to urgent care - he was advised the lesion could be a brown recluse spider or MRSA - he was given different abx  PT states it was not cut open he was advised to come back in ten days - this was yesterday  Pt states since starting the new abx it is about the same, started it yesreday      The following portions of the patient's history were reviewed and updated as appropriate: allergies, current medications, past family history, past medical history, past social history, past surgical history and problem list     Review of Systems   Constitutional: Negative for activity change, appetite change, chills, diaphoresis, fatigue, fever and unexpected weight change  HENT: Negative for congestion, dental problem, ear pain, mouth sores, sinus pressure, sinus pain, sore throat and trouble swallowing  Eyes: Negative for photophobia, discharge and itching  Respiratory: Negative for apnea, chest tightness and shortness of breath  Cardiovascular: Negative for chest pain, palpitations and leg swelling  Gastrointestinal: Negative for abdominal distention, abdominal pain, blood in stool, nausea and vomiting  Endocrine: Negative for cold intolerance, heat intolerance, polydipsia, polyphagia and polyuria  Genitourinary: Negative for difficulty urinating  Musculoskeletal: Negative for arthralgias     Skin: Negative for color change and wound  Large indurated are in left upper thigh  Red swollen warm tender   Neurological: Negative for dizziness, syncope, speech difficulty and headaches  Hematological: Negative for adenopathy  Psychiatric/Behavioral: Negative for agitation and behavioral problems  Current Outpatient Medications   Medication Sig Dispense Refill    amoxicillin (AMOXIL) 500 mg capsule amoxicillin 500 mg capsule   Take 4 capsules by oral route one hour prior to dental procedures   cephalexin (KEFLEX) 500 mg capsule Take 500 mg by mouth every 6 (six) hours      Ibuprofen (Advil) 200 MG CAPS as needed       mupirocin (BACTROBAN) 2 % ointment       sulfamethoxazole-trimethoprim (BACTRIM DS) 800-160 mg per tablet Take 1 tablet by mouth every 12 (twelve) hours for 10 days Take with food  20 tablet 0     No current facility-administered medications for this visit  Objective:    /60   Pulse 76   Temp 97 7 °F (36 5 °C)   Resp 16   Ht 5' 9" (1 753 m)   Wt 89 4 kg (197 lb)   BMI 29 09 kg/m²        Physical Exam  Vitals and nursing note reviewed  Constitutional:       General: He is not in acute distress  Appearance: He is well-developed  He is not diaphoretic  HENT:      Head: Normocephalic and atraumatic  Right Ear: External ear normal       Left Ear: External ear normal       Nose: Nose normal       Mouth/Throat:      Pharynx: No oropharyngeal exudate  Eyes:      General: No scleral icterus  Right eye: No discharge  Left eye: No discharge  Pupils: Pupils are equal, round, and reactive to light  Neck:      Thyroid: No thyromegaly  Cardiovascular:      Rate and Rhythm: Normal rate  Heart sounds: Normal heart sounds  No murmur heard  Pulmonary:      Effort: Pulmonary effort is normal  No respiratory distress  Breath sounds: Normal breath sounds  No wheezing     Abdominal:      General: Bowel sounds are normal  There is no distension  Palpations: Abdomen is soft  There is no mass  Tenderness: There is no abdominal tenderness  There is no guarding or rebound  Musculoskeletal:         General: Normal range of motion  Skin:     General: Skin is warm and dry  Findings: No erythema or rash  Comments: Large indurated warm marlo in left inner upper thigh   Neurological:      Mental Status: He is alert  Coordination: Coordination normal       Deep Tendon Reflexes: Reflexes normal    Psychiatric:         Behavior: Behavior normal               Incision and Drainage    Date/Time: 8/28/2021 11:46 AM  Performed by: Rosalia Prabhakar DO  Authorized by: Rosalia Prabhakar DO   Universal Protocol:  Consent: Verbal consent obtained  Consent given by: patient  Time out: Immediately prior to procedure a "time out" was called to verify the correct patient, procedure, equipment, support staff and site/side marked as required  Timeout called at: 8/28/2021 11:46 AM   Patient understanding: patient states understanding of the procedure being performed  Patient consent: the patient's understanding of the procedure matches consent given  Procedure consent: procedure consent matches procedure scheduled  Relevant documents: relevant documents present and verified  Test results: test results available and properly labeled  Site marked: the operative site was marked  Radiology Images displayed and confirmed  If images not available, report reviewed: imaging studies available  Required items: required blood products, implants, devices, and special equipment available  Patient identity confirmed: verbally with patient      Patient location:  Clinic  Location:     Type:  Abscess    Size:  15    Location:  Lower extremity    Lower extremity location:  L leg  Pre-procedure details:     Skin preparation:  Antiseptic wash  Anesthesia (see MAR for exact dosages):      Anesthesia method:  Local infiltration    Local anesthetic:  Lidocaine 1% w/o epi  Procedure details:     Complexity:  Simple    Needle aspiration: no      Incision types:  Stab incision    Scalpel blade:  10    Approach:  Open    Incision depth:  Subcutaneous    Wound management:  Probed and deloculated    Drainage:  Bloody    Drainage amount:  Scant    Wound treatment:  Wound left open    Packing materials:  None  Post-procedure details:     Patient tolerance of procedure:   Tolerated well, no immediate complications    Complication (if applicable):  None        Easton Noss, DO

## 2021-09-09 ENCOUNTER — OFFICE VISIT (OUTPATIENT)
Dept: FAMILY MEDICINE CLINIC | Facility: CLINIC | Age: 61
End: 2021-09-09
Payer: COMMERCIAL

## 2021-09-09 VITALS
RESPIRATION RATE: 16 BRPM | HEIGHT: 69 IN | SYSTOLIC BLOOD PRESSURE: 120 MMHG | HEART RATE: 64 BPM | TEMPERATURE: 98.2 F | WEIGHT: 200 LBS | DIASTOLIC BLOOD PRESSURE: 70 MMHG | BODY MASS INDEX: 29.62 KG/M2

## 2021-09-09 DIAGNOSIS — L02.416 ABSCESS OF LEFT THIGH: Primary | ICD-10-CM

## 2021-09-09 PROCEDURE — 99213 OFFICE O/P EST LOW 20 MIN: CPT | Performed by: FAMILY MEDICINE

## 2021-09-09 RX ORDER — CLINDAMYCIN HYDROCHLORIDE 300 MG/1
300 CAPSULE ORAL 3 TIMES DAILY
Qty: 30 CAPSULE | Refills: 0 | Status: SHIPPED | OUTPATIENT
Start: 2021-09-09 | End: 2021-09-19

## 2021-09-09 NOTE — PROGRESS NOTES
Assessment/Plan:    1  Abscess of left thigh  -     clindamycin (CLEOCIN) 300 MG capsule; Take 1 capsule (300 mg total) by mouth 3 (three) times a day for 10 days  -     Lyme Total Antibody Profile with reflex to WB; Future            There are no Patient Instructions on file for this visit  Return in about 5 days (around 9/14/2021) for Recheck 30 min appt  Subjective:      Patient ID: Baljit Soto is a 63 y o  male  Chief Complaint   Patient presents with    Follow-up     sore on left leg,  ac/lpn       Pt is here to follow up abscess on left upper thigh  Done with abx - looks a ton better but still present  And is still red, still warm still somewhat tender          The following portions of the patient's history were reviewed and updated as appropriate: allergies, current medications, past family history, past medical history, past social history, past surgical history and problem list     Review of Systems   Constitutional: Negative for activity change, appetite change, chills, diaphoresis, fatigue, fever and unexpected weight change  HENT: Negative for congestion, dental problem, ear pain, mouth sores, sinus pressure, sinus pain, sore throat and trouble swallowing  Eyes: Negative for photophobia, discharge and itching  Respiratory: Negative for apnea, chest tightness and shortness of breath  Cardiovascular: Negative for chest pain, palpitations and leg swelling  Gastrointestinal: Negative for abdominal distention, abdominal pain, blood in stool, nausea and vomiting  Endocrine: Negative for cold intolerance, heat intolerance, polydipsia, polyphagia and polyuria  Genitourinary: Negative for difficulty urinating  Musculoskeletal: Negative for arthralgias  Skin: Positive for rash  Negative for color change and wound  Swollen tender area   Neurological: Negative for dizziness, syncope, speech difficulty and headaches  Hematological: Negative for adenopathy  Psychiatric/Behavioral: Negative for agitation and behavioral problems  Current Outpatient Medications   Medication Sig Dispense Refill    amoxicillin (AMOXIL) 500 mg capsule amoxicillin 500 mg capsule   Take 4 capsules by oral route one hour prior to dental procedures   Ibuprofen (Advil) 200 MG CAPS as needed       clindamycin (CLEOCIN) 300 MG capsule Take 1 capsule (300 mg total) by mouth 3 (three) times a day for 10 days 30 capsule 0     No current facility-administered medications for this visit  Objective:    /70   Pulse 64   Temp 98 2 °F (36 8 °C)   Resp 16   Ht 5' 9" (1 753 m)   Wt 90 7 kg (200 lb)   BMI 29 53 kg/m²        Physical Exam  Vitals and nursing note reviewed  Constitutional:       General: He is not in acute distress  Appearance: He is well-developed  He is not diaphoretic  HENT:      Head: Normocephalic and atraumatic  Right Ear: External ear normal       Left Ear: External ear normal       Nose: Nose normal       Mouth/Throat:      Pharynx: No oropharyngeal exudate  Eyes:      General: No scleral icterus  Right eye: No discharge  Left eye: No discharge  Pupils: Pupils are equal, round, and reactive to light  Neck:      Thyroid: No thyromegaly  Cardiovascular:      Rate and Rhythm: Normal rate  Heart sounds: Normal heart sounds  No murmur heard  Pulmonary:      Effort: Pulmonary effort is normal  No respiratory distress  Breath sounds: Normal breath sounds  No wheezing  Abdominal:      General: Bowel sounds are normal  There is no distension  Palpations: Abdomen is soft  There is no mass  Tenderness: There is no abdominal tenderness  There is no guarding or rebound  Musculoskeletal:         General: Normal range of motion  Skin:     General: Skin is warm and dry  Findings: Rash present  No erythema        Comments: Swollen red tender indurated left inner thigh   Neurological:      Mental Status: He is alert        Coordination: Coordination normal       Deep Tendon Reflexes: Reflexes normal    Psychiatric:         Behavior: Behavior normal                 Jhon Whiteside, DO

## 2021-09-16 ENCOUNTER — OFFICE VISIT (OUTPATIENT)
Dept: FAMILY MEDICINE CLINIC | Facility: CLINIC | Age: 61
End: 2021-09-16
Payer: COMMERCIAL

## 2021-09-16 VITALS
HEART RATE: 61 BPM | BODY MASS INDEX: 28.88 KG/M2 | SYSTOLIC BLOOD PRESSURE: 126 MMHG | OXYGEN SATURATION: 97 % | TEMPERATURE: 97 F | WEIGHT: 195 LBS | HEIGHT: 69 IN | DIASTOLIC BLOOD PRESSURE: 68 MMHG | RESPIRATION RATE: 18 BRPM
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
## 2021-09-16 DIAGNOSIS — L02.416 ABSCESS OF LEFT THIGH: Primary | ICD-10-CM

## 2021-09-16 PROCEDURE — 3008F BODY MASS INDEX DOCD: CPT | Performed by: FAMILY MEDICINE

## 2021-09-16 PROCEDURE — 99213 OFFICE O/P EST LOW 20 MIN: CPT | Performed by: FAMILY MEDICINE

## 2021-09-16 PROCEDURE — 1036F TOBACCO NON-USER: CPT | Performed by: FAMILY MEDICINE

## 2021-09-16 NOTE — PROGRESS NOTES
Assessment/Plan:    1  Abscess of left thigh    2  Tick bite, initial encounter  -     Lyme Total Antibody Profile with reflex to WB; Future            There are no Patient Instructions on file for this visit  Return for Recheck  Subjective:      Patient ID: Clare Callaway is a 63 y o  male  Chief Complaint   Patient presents with    Follow-up     leg   sas/cma       Pt is here to follow up abscess on left upper thigh  Area is improved less red etc  Pt feels better      Pt dopes states he has had multiple tick bites and would like to be tested for lyme      The following portions of the patient's history were reviewed and updated as appropriate: allergies, current medications, past family history, past medical history, past social history, past surgical history and problem list     Review of Systems   Constitutional: Negative for activity change, appetite change, chills, diaphoresis, fatigue, fever and unexpected weight change  HENT: Negative for congestion, dental problem, ear pain, mouth sores, sinus pressure, sinus pain, sore throat and trouble swallowing  Eyes: Negative for photophobia, discharge and itching  Respiratory: Negative for apnea, chest tightness and shortness of breath  Cardiovascular: Negative for chest pain, palpitations and leg swelling  Gastrointestinal: Negative for abdominal distention, abdominal pain, blood in stool, nausea and vomiting  Endocrine: Negative for cold intolerance, heat intolerance, polydipsia, polyphagia and polyuria  Genitourinary: Negative for difficulty urinating  Musculoskeletal: Negative for arthralgias  Skin: Positive for rash  Negative for color change and wound  Neurological: Negative for dizziness, syncope, speech difficulty and headaches  Hematological: Negative for adenopathy  Psychiatric/Behavioral: Negative for agitation and behavioral problems           Current Outpatient Medications   Medication Sig Dispense Refill    clindamycin (CLEOCIN) 300 MG capsule Take 1 capsule (300 mg total) by mouth 3 (three) times a day for 10 days 30 capsule 0     No current facility-administered medications for this visit  Objective:    /68   Pulse 61   Temp (!) 97 °F (36 1 °C)   Resp 18   Ht 5' 9" (1 753 m)   Wt 88 5 kg (195 lb)   SpO2 97%   BMI 28 80 kg/m²        Physical Exam  Vitals and nursing note reviewed  Constitutional:       General: He is not in acute distress  Appearance: He is well-developed  He is not diaphoretic  HENT:      Head: Normocephalic and atraumatic  Right Ear: External ear normal       Left Ear: External ear normal       Nose: Nose normal       Mouth/Throat:      Pharynx: No oropharyngeal exudate  Eyes:      General: No scleral icterus  Right eye: No discharge  Left eye: No discharge  Pupils: Pupils are equal, round, and reactive to light  Neck:      Thyroid: No thyromegaly  Cardiovascular:      Rate and Rhythm: Normal rate  Heart sounds: Normal heart sounds  No murmur heard  Pulmonary:      Effort: Pulmonary effort is normal  No respiratory distress  Breath sounds: Normal breath sounds  No wheezing  Abdominal:      General: Bowel sounds are normal  There is no distension  Palpations: Abdomen is soft  There is no mass  Tenderness: There is no abdominal tenderness  There is no guarding or rebound  Musculoskeletal:         General: Normal range of motion  Skin:     General: Skin is warm and dry  Findings: No erythema or rash  Comments: Pt with improver erythema on left upper thigh  Less indurated  No wound   Neurological:      Mental Status: He is alert        Coordination: Coordination normal       Deep Tendon Reflexes: Reflexes normal    Psychiatric:         Behavior: Behavior normal                 Francee Chris, DO

## 2022-06-13 ENCOUNTER — OFFICE VISIT (OUTPATIENT)
Dept: FAMILY MEDICINE CLINIC | Facility: CLINIC | Age: 62
End: 2022-06-13
Payer: COMMERCIAL

## 2022-06-13 VITALS
DIASTOLIC BLOOD PRESSURE: 80 MMHG | SYSTOLIC BLOOD PRESSURE: 132 MMHG | OXYGEN SATURATION: 97 % | TEMPERATURE: 96.5 F | RESPIRATION RATE: 18 BRPM | WEIGHT: 204 LBS | BODY MASS INDEX: 30.21 KG/M2 | HEART RATE: 53 BPM | HEIGHT: 69 IN

## 2022-06-13 DIAGNOSIS — Z78.9 UNKNOWN VARICELLA VACCINATION STATUS: ICD-10-CM

## 2022-06-13 DIAGNOSIS — Z12.5 SCREENING PSA (PROSTATE SPECIFIC ANTIGEN): ICD-10-CM

## 2022-06-13 DIAGNOSIS — Z00.00 ROUTINE ADULT HEALTH MAINTENANCE: Primary | ICD-10-CM

## 2022-06-13 DIAGNOSIS — J45.990 EXERCISE-INDUCED BRONCHOSPASM: ICD-10-CM

## 2022-06-13 DIAGNOSIS — Z13.6 SCREENING FOR CARDIOVASCULAR CONDITION: ICD-10-CM

## 2022-06-13 PROCEDURE — 99396 PREV VISIT EST AGE 40-64: CPT | Performed by: NURSE PRACTITIONER

## 2022-06-13 PROCEDURE — 1036F TOBACCO NON-USER: CPT | Performed by: NURSE PRACTITIONER

## 2022-06-13 PROCEDURE — 3008F BODY MASS INDEX DOCD: CPT | Performed by: NURSE PRACTITIONER

## 2022-06-13 PROCEDURE — 3725F SCREEN DEPRESSION PERFORMED: CPT | Performed by: NURSE PRACTITIONER

## 2022-06-13 RX ORDER — TRIAMCINOLONE ACETONIDE 1 MG/G
CREAM TOPICAL
COMMUNITY
Start: 2022-05-16

## 2022-06-13 RX ORDER — IVERMECTIN 10 MG/G
CREAM TOPICAL
COMMUNITY
Start: 2022-03-22

## 2022-06-13 NOTE — PROGRESS NOTES
FAMILY PRACTICE HEALTH MAINTENANCE OFFICE VISIT  [facility] - [facility]    NAME: India Galeazzi  AGE: 57 y o  SEX: male  : 1960     DATE: 2022    Assessment and Plan     1  Routine adult health maintenance    2  Screening for cardiovascular condition  -     Comprehensive metabolic panel; Future  -     CBC and differential; Future  -     Lipid panel; Future  -     Comprehensive metabolic panel  -     CBC and differential  -     Lipid panel    3  Screening PSA (prostate specific antigen)  -     PSA Total (Reflex To Free); Future  -     PSA Total (Reflex To Free)    4  Unknown varicella vaccination status  -     Varicella zoster antibody, IgG; Future  -     Varicella zoster antibody, IgG    5  Exercise-induced bronchospasm  Assessment & Plan:  Has been stable, does not needed an inhaler in the past 2-3 years           Patient Counseling:   Nutrition: Stressed importance of a well balanced diet, moderation of sodium/saturated fat, caloric balance and sufficient intake of fiber  Exercise: Stressed the importance of regular exercise with a goal of 150 minutes per week  Dental Health: Discussed daily flossing and brushing and regular dental visits     Immunizations reviewed: Up To Date  Pt does not believe he has had chicken pox in the past   Will check titers, he would consider Shingrix pending results  Discussed benefits of:  Colon Cancer Screening, Prostate Cancer Screening  and Screening labs  BMI Counseling: Body mass index is 30 13 kg/m²  Discussed with patient's BMI with him  The BMI is above normal  Exercise recommendations include moderate aerobic physical activity for 150 minutes/week  Return for Annual physical         Chief Complaint     Chief Complaint   Patient presents with    Physical Exam     Sas/cma       History of Present Illness     Here today for CPE  Doing well, no concerns  Due for labs  Sees ortho for OA of both knees     CRC screening up to date, he has polyps and family hx of colon cancer  Well Adult Physical   Patient here for a comprehensive physical exam       Diet and Physical Activity  Diet: well balanced diet, but snacks frequently  Exercise: frequently      Depression Screen  PHQ-2/9 Depression Screening    Little interest or pleasure in doing things: 0 - not at all  Feeling down, depressed, or hopeless: 0 - not at all  PHQ-2 Score: 0  PHQ-2 Interpretation: Negative depression screen          General Health  Hearing: Normal:  bilateral  Vision: goes for regular eye exams and previous LASIK surgery  Dental: regular dental visits and brushes teeth twice daily    Reproductive Health  No issues       The following portions of the patient's history were reviewed and updated as appropriate: allergies, current medications, past family history, past medical history, past social history, past surgical history and problem list     Review of Systems     Review of Systems   Constitutional: Negative  Respiratory: Negative  Cardiovascular: Negative  Gastrointestinal: Negative  Genitourinary: Negative  Musculoskeletal: Negative  Neurological: Negative  Psychiatric/Behavioral: Negative          Past Medical History     Past Medical History:   Diagnosis Date    Arthritis     knees    Colon polyp     Extrinsic asthma     Undescended right testis        Past Surgical History     Past Surgical History:   Procedure Laterality Date    BILATERAL HIP ARTHROSCOPY      resurfaced    COLONOSCOPY      HERNIA REPAIR Bilateral     age 4yr   [facility] KNEE ARTHROSCOPY Bilateral     meniscus repair    LASIK      ORCHIECTOMY      TONSILLECTOMY      WISDOM TOOTH EXTRACTION         Social History     Social History     Socioeconomic History    Marital status: Single     Spouse name: None    Number of children: None    Years of education: None    Highest education level: None   Occupational History    None   Tobacco Use    Smoking status: Former Smoker     Types: Cigarettes     Quit date: 1982     Years since quittin 4    Smokeless tobacco: Never Used    Tobacco comment: quit 35 yrs ago   Vaping Use    Vaping Use: Never used   Substance and Sexual Activity    Alcohol use: Yes     Alcohol/week: 2 0 standard drinks     Types: 2 Glasses of wine per week     Comment: social    Drug use: Never    Sexual activity: None   Other Topics Concern    None   Social History Narrative    None     Social Determinants of Health     Financial Resource Strain: Not on file   Food Insecurity: Not on file   Transportation Needs: Not on file   Physical Activity: Not on file   Stress: Not on file   Social Connections: Not on file   Intimate Partner Violence: Not on file   Housing Stability: Not on file       Family History     Family History   Problem Relation Age of Onset    Bone cancer Father     Prostate cancer Father     Cancer Father         prostate w/mets    COPD Mother         emphysema-smoker    Hypertension Mother     Mental illness Neg Hx        Current Medications       Current Outpatient Medications:     Oracea 40 MG capsule, Take 40 mg by mouth daily, Disp: , Rfl:     Soolantra 1 % CREA, APPLY THIN LAYER TO FACE AT BEDTIME, Disp: , Rfl:     triamcinolone (KENALOG) 0 1 % cream, APPLY TO AFFECTED AREA TWICE DAILY TO BODY X2 WEEKS  THEN AS NEEDED FOR FLARES  AVOID FACE AND GROIN, Disp: , Rfl:      Allergies     No Known Allergies    Objective     /80   Pulse (!) 53   Temp (!) 96 5 °F (35 8 °C)   Resp 18   Ht 5' 9" (1 753 m)   Wt 92 5 kg (204 lb)   SpO2 97%   BMI 30 13 kg/m²      Physical Exam  Vitals and nursing note reviewed  Constitutional:       Appearance: Normal appearance  He is well-developed  HENT:      Head: Normocephalic and atraumatic  Right Ear: Tympanic membrane, ear canal and external ear normal       Left Ear: Tympanic membrane, ear canal and external ear normal       Nose: No mucosal edema     Eyes: Conjunctiva/sclera: Conjunctivae normal    Neck:      Thyroid: No thyromegaly  Vascular: No carotid bruit  Cardiovascular:      Rate and Rhythm: Normal rate and regular rhythm  Pulses: Normal pulses  Heart sounds: Normal heart sounds  No murmur heard  Pulmonary:      Effort: Pulmonary effort is normal       Breath sounds: Normal breath sounds  Abdominal:      General: Bowel sounds are normal  There is no distension  Palpations: There is no hepatomegaly or splenomegaly  Tenderness: There is no abdominal tenderness  Musculoskeletal:         General: Normal range of motion  Cervical back: Full passive range of motion without pain and normal range of motion  No edema  Lymphadenopathy:      Cervical:      Right cervical: No superficial cervical adenopathy  Left cervical: No superficial cervical adenopathy  Skin:     General: Skin is warm and dry  Findings: No rash  Neurological:      Mental Status: He is alert  Sensory: No sensory deficit  Coordination: Coordination normal       Deep Tendon Reflexes: Reflexes are normal and symmetric   Reflexes normal    Psychiatric:         Mood and Affect: Mood normal          Behavior: Behavior normal             Visual Acuity Screening    Right eye Left eye Both eyes   Without correction: 20/25 20/25 20/25   With correction:              Yaritza Colon, [address] [place] [address]

## 2022-06-23 LAB
ALBUMIN SERPL-MCNC: 4.3 G/DL (ref 3.8–4.8)
ALBUMIN/GLOB SERPL: 1.9 {RATIO} (ref 1.2–2.2)
ALP SERPL-CCNC: 76 IU/L (ref 44–121)
ALT SERPL-CCNC: 23 IU/L (ref 0–44)
AST SERPL-CCNC: 24 IU/L (ref 0–40)
BASOPHILS # BLD AUTO: 0.1 X10E3/UL (ref 0–0.2)
BASOPHILS NFR BLD AUTO: 1 %
BILIRUB SERPL-MCNC: 0.5 MG/DL (ref 0–1.2)
BUN SERPL-MCNC: 18 MG/DL (ref 8–27)
BUN/CREAT SERPL: 17 (ref 10–24)
CALCIUM SERPL-MCNC: 9.6 MG/DL (ref 8.6–10.2)
CHLORIDE SERPL-SCNC: 103 MMOL/L (ref 96–106)
CHOLEST SERPL-MCNC: 212 MG/DL (ref 100–199)
CHOLEST/HDLC SERPL: 4.4 RATIO (ref 0–5)
CO2 SERPL-SCNC: 20 MMOL/L (ref 20–29)
CREAT SERPL-MCNC: 1.05 MG/DL (ref 0.76–1.27)
EGFR: 80 ML/MIN/1.73
EOSINOPHIL # BLD AUTO: 0.2 X10E3/UL (ref 0–0.4)
EOSINOPHIL NFR BLD AUTO: 3 %
ERYTHROCYTE [DISTWIDTH] IN BLOOD BY AUTOMATED COUNT: 13.2 % (ref 11.6–15.4)
GLOBULIN SER-MCNC: 2.3 G/DL (ref 1.5–4.5)
GLUCOSE SERPL-MCNC: 96 MG/DL (ref 65–99)
HCT VFR BLD AUTO: 46.7 % (ref 37.5–51)
HDLC SERPL-MCNC: 48 MG/DL
HGB BLD-MCNC: 15.5 G/DL (ref 13–17.7)
IMM GRANULOCYTES # BLD: 0 X10E3/UL (ref 0–0.1)
IMM GRANULOCYTES NFR BLD: 0 %
LDLC SERPL CALC-MCNC: 148 MG/DL (ref 0–99)
LYMPHOCYTES # BLD AUTO: 2.5 X10E3/UL (ref 0.7–3.1)
LYMPHOCYTES NFR BLD AUTO: 39 %
MCH RBC QN AUTO: 28.2 PG (ref 26.6–33)
MCHC RBC AUTO-ENTMCNC: 33.2 G/DL (ref 31.5–35.7)
MCV RBC AUTO: 85 FL (ref 79–97)
MICRODELETION SYND BLD/T FISH: NORMAL
MONOCYTES # BLD AUTO: 0.6 X10E3/UL (ref 0.1–0.9)
MONOCYTES NFR BLD AUTO: 10 %
NEUTROPHILS # BLD AUTO: 3 X10E3/UL (ref 1.4–7)
NEUTROPHILS NFR BLD AUTO: 47 %
PLATELET # BLD AUTO: 233 X10E3/UL (ref 150–450)
POTASSIUM SERPL-SCNC: 4.5 MMOL/L (ref 3.5–5.2)
PROT SERPL-MCNC: 6.6 G/DL (ref 6–8.5)
PSA SERPL-MCNC: 4.5 NG/ML (ref 0–4)
RBC # BLD AUTO: 5.5 X10E6/UL (ref 4.14–5.8)
SL AMB % FREE PSA: 16 %
SL AMB PSA, FREE: 0.72 NG/ML
SL AMB REFLEX CRITERIA: ABNORMAL
SL AMB VLDL CHOLESTEROL CALC: 16 MG/DL (ref 5–40)
SODIUM SERPL-SCNC: 139 MMOL/L (ref 134–144)
TRIGL SERPL-MCNC: 88 MG/DL (ref 0–149)
VZV IGG SER IA-ACNC: 862 INDEX
WBC # BLD AUTO: 6.4 X10E3/UL (ref 3.4–10.8)

## 2022-09-08 DIAGNOSIS — R97.20 ELEVATED PSA: Primary | ICD-10-CM

## 2022-10-24 ENCOUNTER — OFFICE VISIT (OUTPATIENT)
Dept: UROLOGY | Facility: CLINIC | Age: 62
End: 2022-10-24
Payer: COMMERCIAL

## 2022-10-24 VITALS
WEIGHT: 208 LBS | SYSTOLIC BLOOD PRESSURE: 120 MMHG | BODY MASS INDEX: 30.81 KG/M2 | OXYGEN SATURATION: 97 % | HEART RATE: 55 BPM | DIASTOLIC BLOOD PRESSURE: 80 MMHG | HEIGHT: 69 IN

## 2022-10-24 DIAGNOSIS — R97.20 ELEVATED PSA: ICD-10-CM

## 2022-10-24 PROCEDURE — 99203 OFFICE O/P NEW LOW 30 MIN: CPT | Performed by: PHYSICIAN ASSISTANT

## 2022-10-24 NOTE — PROGRESS NOTES
1  Elevated PSA  Ambulatory Referral to Urology    Basic metabolic panel    MRI prostate multiparametric wo w contrast    PSA Total, Diagnostic       Assessment and plan:       1  Elevated PSA  2  Family history of prostate cancer  -normal digital rectal examination  - will repeat a PSA as well as obtain a multiparametric prostate MRI  - we discussed base of his results will dictate a transrectal ultrasound-guided prostate biopsy versus a transperineal fusion guided biopsy  - all questions answered    Teresa Munoz      Chief Complaint     Chief Complaint   Patient presents with   • Elevated PSA     NEW PT          History of Present Illness     Barbi Trinidad is a 57 y o  male presenting today for consultation of elevated PSA  Patient was undergoing routine prostate cancer screening with his primary care provider  Reviewed  PSA trend:  3 7 (04/14/2021)  4 5 16% free (06/22/2022)  5 8, 15% free(09/07/2022)    Patient has a history of undescended right testicle status post orchiectomy approximately 8 years ago  Other medical comorbidities include osteoarthritis and asthma  Patient is comfortable with his lower urinary tract symptoms  Denies any dysuria, gross hematuria, incontinence, infection, bone pain, weight loss  He does endorse a family history of prostate cancer in his father who was diagnosed with late stage prostate cancer in his [de-identified]  Laboratory     Lab Results   Component Value Date    CREATININE 1 05 06/22/2022       Lab Results   Component Value Date    PSA 5 8 (H) 09/07/2022    PSA 4 5 (H) 06/22/2022    PSA 3 7 04/14/2021       Review of Systems     Review of Systems   Constitutional: Negative for activity change, appetite change, chills, diaphoresis, fatigue, fever and unexpected weight change  Respiratory: Negative for chest tightness and shortness of breath  Cardiovascular: Negative for chest pain, palpitations and leg swelling     Gastrointestinal: Negative for abdominal distention, abdominal pain, constipation, diarrhea, nausea and vomiting  Genitourinary: Negative for decreased urine volume, difficulty urinating, dysuria, enuresis, flank pain, frequency, genital sores, hematuria and urgency  Musculoskeletal: Negative for back pain, gait problem and myalgias  Skin: Negative for color change, pallor, rash and wound  Psychiatric/Behavioral: Negative for behavioral problems  The patient is not nervous/anxious  AUA SYMPTOM SCORE    Flowsheet Row Most Recent Value   AUA SYMPTOM SCORE    How often have you had a sensation of not emptying your bladder completely after you finished urinating? 0 (P)     How often have you had to urinate again less than two hours after you finished urinating? 1 (P)     How often have you found you stopped and started again several times when you urinate? 1 (P)     How often have you found it difficult to postpone urination? 1 (P)     How often have you had a weak urinary stream? 0 (P)     How often have you had to push or strain to begin urination? 0 (P)     How many times did you most typically get up to urinate from the time you went to bed at night until the time you got up in the morning? 0 (P)     Quality of Life: If you were to spend the rest of your life with your urinary condition just the way it is now, how would you feel about that? 0 (P)     AUA SYMPTOM SCORE 3 (P)           Allergies     No Known Allergies    Physical Exam     Physical Exam  Constitutional:       General: He is not in acute distress  Appearance: Normal appearance  He is not ill-appearing, toxic-appearing or diaphoretic  HENT:      Head: Normocephalic and atraumatic  Eyes:      General:         Right eye: No discharge  Left eye: No discharge  Conjunctiva/sclera: Conjunctivae normal    Pulmonary:      Effort: Pulmonary effort is normal  No respiratory distress  Genitourinary:     Comments: Good rectal tone   Prostate 30g, no nodules  Musculoskeletal:         General: No swelling or tenderness  Normal range of motion  Skin:     General: Skin is warm and dry  Coloration: Skin is not jaundiced or pale  Neurological:      General: No focal deficit present  Mental Status: He is alert and oriented to person, place, and time  Psychiatric:         Mood and Affect: Mood normal          Behavior: Behavior normal          Vital Signs     Vitals:    10/24/22 0802   BP: 120/80   BP Location: Left arm   Patient Position: Sitting   Cuff Size: Standard   Pulse: 55   SpO2: 97%   Weight: 94 3 kg (208 lb)   Height: 5' 9" (1 753 m)         Current Medications       Current Outpatient Medications:   •  Oracea 40 MG capsule, Take 40 mg by mouth daily, Disp: , Rfl:   •  Soolantra 1 % CREA, APPLY THIN LAYER TO FACE AT BEDTIME, Disp: , Rfl:   •  triamcinolone (KENALOG) 0 1 % cream, APPLY TO AFFECTED AREA TWICE DAILY TO BODY X2 WEEKS  THEN AS NEEDED FOR FLARES   AVOID FACE AND GROIN, Disp: , Rfl:       Active Problems     Patient Active Problem List   Diagnosis   • Osteoarthritis of knee   • Exercise-induced bronchospasm   • Primary localized osteoarthrosis of pelvic region   • Organic impotence   • S/P orchiectomy         Past Medical History     Past Medical History:   Diagnosis Date   • Arthritis     knees   • Colon polyp    • Extrinsic asthma    • Undescended right testis          Surgical History     Past Surgical History:   Procedure Laterality Date   • BILATERAL HIP ARTHROSCOPY      resurfaced   • COLONOSCOPY     • HERNIA REPAIR Bilateral     age 4yr   • KNEE ARTHROSCOPY Bilateral     meniscus repair   • LASIK     • ORCHIECTOMY     • TONSILLECTOMY     • WISDOM TOOTH EXTRACTION           Family History     Family History   Problem Relation Age of Onset   • Bone cancer Father    • Prostate cancer Father    • Cancer Father         prostate w/mets   • COPD Mother         emphysema-smoker   • Hypertension Mother    • Mental illness Neg Hx Social History     Social History       Radiology

## 2022-12-06 LAB
BUN SERPL-MCNC: 15 MG/DL (ref 8–27)
BUN/CREAT SERPL: 12 (ref 10–24)
CALCIUM SERPL-MCNC: 10 MG/DL (ref 8.6–10.2)
CHLORIDE SERPL-SCNC: 102 MMOL/L (ref 96–106)
CO2 SERPL-SCNC: 25 MMOL/L (ref 20–29)
CREAT SERPL-MCNC: 1.22 MG/DL (ref 0.76–1.27)
EGFR: 67 ML/MIN/1.73
GLUCOSE SERPL-MCNC: 110 MG/DL (ref 70–99)
POTASSIUM SERPL-SCNC: 5 MMOL/L (ref 3.5–5.2)
PSA SERPL-MCNC: 6 NG/ML (ref 0–4)
SODIUM SERPL-SCNC: 140 MMOL/L (ref 134–144)

## 2022-12-07 ENCOUNTER — HOSPITAL ENCOUNTER (OUTPATIENT)
Dept: RADIOLOGY | Age: 62
Discharge: HOME | End: 2022-12-07

## 2022-12-07 DIAGNOSIS — R97.20 ELEVATED PSA: ICD-10-CM

## 2023-01-16 ENCOUNTER — RA CDI HCC (OUTPATIENT)
Dept: OTHER | Facility: HOSPITAL | Age: 63
End: 2023-01-16

## 2023-01-16 NOTE — PROGRESS NOTES
[facility]  coding opportunities          Chart Reviewed number of suggestions sent to Provider: 1     Patients Insurance        Commercial Insurance: [employer]     [id number]

## 2023-01-23 ENCOUNTER — OFFICE VISIT (OUTPATIENT)
Dept: FAMILY MEDICINE CLINIC | Facility: CLINIC | Age: 63
End: 2023-01-23

## 2023-01-23 VITALS
HEART RATE: 52 BPM | HEIGHT: 69 IN | SYSTOLIC BLOOD PRESSURE: 148 MMHG | TEMPERATURE: 96 F | WEIGHT: 210 LBS | BODY MASS INDEX: 31.1 KG/M2 | RESPIRATION RATE: 20 BRPM | DIASTOLIC BLOOD PRESSURE: 74 MMHG

## 2023-01-23 DIAGNOSIS — H91.93 BILATERAL HEARING LOSS, UNSPECIFIED HEARING LOSS TYPE: Primary | ICD-10-CM

## 2023-01-23 DIAGNOSIS — H61.22 IMPACTED CERUMEN OF LEFT EAR: ICD-10-CM

## 2023-01-23 NOTE — PROGRESS NOTES
Assessment/Plan:    Cerumen removed left ear  Referral provided for audiology for comprehensive hearing exam    1  Bilateral hearing loss, unspecified hearing loss type  -     Ambulatory Referral to Audiology; Future    2  Impacted cerumen of left ear  -     Ear cerumen removal  Ear cerumen removal    Date/Time: 1/23/2023 10:01 AM  Performed by: Tiffanie Williamson  Authorized by: MANNY Benson   Universal Protocol:  Consent: Verbal consent obtained  Consent given by: patient      Patient location:  Clinic  Procedure details:     Local anesthetic:  None    Location:  L ear    Procedure type: irrigation with instrumentation      Instrumentation: curette      Approach:  External  Post-procedure details:     Complication:  None    Hearing quality:  Normal    Patient tolerance of procedure: Tolerated well, no immediate complications        BMI Counseling: Body mass index is 31 01 kg/m²  The BMI is above normal  Nutrition recommendations include consuming healthier snacks  Exercise recommendations include moderate physical activity 150 minutes/week  Rationale for BMI follow-up plan is due to patient being overweight or obese  Depression Screening and Follow-up Plan: Patient was screened for depression during today's encounter  They screened negative with a PHQ-2 score of 0  There are no Patient Instructions on file for this visit  Return if symptoms worsen or fail to improve  Subjective:      Patient ID: Obi Nunez is a 60 y o  male  Chief Complaint   Patient presents with   • Hearing Problem     Discuss hearing loss- wants a referral  [email or web address]       Pt has chronic hearing loss  Used to have hearing tests done in the past, which were abnormal   Feels this is getting worse  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    Earache   There is pain in the left ear  The current episode started 1 to 4 weeks ago  The problem occurs every few hours   The problem has been gradually improving  There has been no fever  The fever has been present for less than 1 day  The pain is at a severity of 3/10  Associated symptoms include hearing loss  Pertinent negatives include no abdominal pain, coughing, diarrhea, ear discharge, headaches, neck pain, rash, rhinorrhea, sore throat or vomiting  The following portions of the patient's history were reviewed and updated as appropriate: allergies, current medications, past family history, past medical history, past social history, past surgical history and problem list     Review of Systems   HENT: Positive for ear pain and hearing loss  Negative for ear discharge, rhinorrhea and sore throat  Respiratory: Negative for cough  Gastrointestinal: Negative for abdominal pain, diarrhea and vomiting  Musculoskeletal: Negative for neck pain  Skin: Negative for rash  Neurological: Negative for headaches  Current Outpatient Medications   Medication Sig Dispense Refill   • Oracea 40 MG capsule Take 40 mg by mouth daily     • Soolantra 1 % CREA APPLY THIN LAYER TO FACE AT BEDTIME     • triamcinolone (KENALOG) 0 1 % cream APPLY TO AFFECTED AREA TWICE DAILY TO BODY X2 WEEKS  THEN AS NEEDED FOR FLARES  AVOID FACE AND GROIN     • bisacodyl (FLEET) 10 MG/30ML ENEM Insert 30 mL (10 mg total) into the rectum once for 1 dose Take morning of biopsy 30 mL 0     No current facility-administered medications for this visit  Objective:    /74   Pulse (!) 52   Temp (!) 96 °F (35 6 °C)   Resp 20   Ht 5' 9" (1 753 m)   Wt 95 3 kg (210 lb)   BMI 31 01 kg/m²        Physical Exam  Vitals and nursing note reviewed  Constitutional:       General: He is not in acute distress  Appearance: Normal appearance  HENT:      Right Ear: Tympanic membrane normal       Left Ear: There is impacted cerumen  Nose: Nose normal    Cardiovascular:      Pulses: Normal pulses     Pulmonary:      Effort: Pulmonary effort is normal    Neurological: Mental Status: He is alert     Psychiatric:         Mood and Affect: Mood normal          Behavior: Behavior normal                 MANNY Maxwell

## 2023-01-30 ENCOUNTER — OFFICE VISIT (OUTPATIENT)
Dept: URGENT CARE | Facility: CLINIC | Age: 63
End: 2023-01-30

## 2023-01-30 VITALS
TEMPERATURE: 98 F | RESPIRATION RATE: 18 BRPM | HEIGHT: 70 IN | SYSTOLIC BLOOD PRESSURE: 148 MMHG | WEIGHT: 209 LBS | DIASTOLIC BLOOD PRESSURE: 87 MMHG | BODY MASS INDEX: 29.92 KG/M2 | OXYGEN SATURATION: 95 % | HEART RATE: 63 BPM

## 2023-01-30 DIAGNOSIS — J06.9 VIRAL URI WITH COUGH: Primary | ICD-10-CM

## 2023-01-30 DIAGNOSIS — B30.9 ACUTE VIRAL CONJUNCTIVITIS OF RIGHT EYE: ICD-10-CM

## 2023-01-30 RX ORDER — KETOTIFEN FUMARATE 0.35 MG/ML
1 SOLUTION/ DROPS OPHTHALMIC 2 TIMES DAILY
Qty: 5 ML | Refills: 0 | Status: SHIPPED | OUTPATIENT
Start: 2023-01-30

## 2023-01-30 NOTE — PROGRESS NOTES
[facility]        NAME: Misael Huddleston is a 60 y o  male  : 1960    MRN: [id number]  DATE: 2023  TIME: 11:26 AM    Assessment and Plan   Viral URI with cough [J06 9]  1  Viral URI with cough        2  Acute viral conjunctivitis of right eye  ketotifen (ZADITOR) 0 025 % ophthalmic solution            Patient Instructions     Patient Instructions   Use Zaditor eyedrops as instructed  Discussed no signs of bacterial infection, to continue over-the-counter cough and cold medication, maintain adequate fluid hydration and rest         Follow up with PCP in 3-5 days  Proceed to  ER if symptoms worsen  Chief Complaint     Chief Complaint   Patient presents with   • Cough     X 1 WEEK  , C/O SINUS CONGESTION , AND RIGHT EYE WATERY         History of Present Illness       Patient is a 79-year-old male presenting today with cold symptoms x1 week  Patient notes several days ago he was experiencing subjective fever, body aches and cold symptoms, notes that his symptoms have been improving over the last couple days and currently feels much better than he did but wanted to make sure everything was okay  Has also had some watering and itchiness of his right eye  Has been taking over-the-counter DayQuil which does provide relief of his symptoms  Denies chest tightness, SOB, N/V/D, trouble swallowing, eye pain, vision changes  Review of Systems   Review of Systems   Constitutional: Negative for chills and fever  HENT: Positive for congestion and sore throat  Negative for ear pain, sinus pressure and trouble swallowing  Eyes: Negative for pain  Respiratory: Positive for cough  Negative for chest tightness and shortness of breath  Cardiovascular: Negative for chest pain  Gastrointestinal: Negative for abdominal pain  Musculoskeletal: Negative for myalgias  Skin: Negative for rash  Neurological: Negative for headaches           Current Medications       Current Outpatient Medications:   •  ketotifen (ZADITOR) 0 025 % ophthalmic solution, Administer 1 drop to the right eye 2 (two) times a day, Disp: 5 mL, Rfl: 0  •  bisacodyl (FLEET) 10 MG/30ML ENEM, Insert 30 mL (10 mg total) into the rectum once for 1 dose Take morning of biopsy, Disp: 30 mL, Rfl: 0  •  Oracea 40 MG capsule, Take 40 mg by mouth daily, Disp: , Rfl:   •  Soolantra 1 % CREA, APPLY THIN LAYER TO FACE AT BEDTIME, Disp: , Rfl:   •  triamcinolone (KENALOG) 0 1 % cream, APPLY TO AFFECTED AREA TWICE DAILY TO BODY X2 WEEKS  THEN AS NEEDED FOR FLARES  AVOID FACE AND GROIN, Disp: , Rfl:     Current Allergies     Allergies as of 01/30/2023   • (No Known Allergies)            The following portions of the patient's history were reviewed and updated as appropriate: allergies, current medications, past family history, past medical history, past social history, past surgical history and problem list      Past Medical History:   Diagnosis Date   • Arthritis     knees   • Colon polyp    • Extrinsic asthma    • Undescended right testis        Past Surgical History:   Procedure Laterality Date   • BILATERAL HIP ARTHROSCOPY      resurfaced   • COLONOSCOPY     • HERNIA REPAIR Bilateral     age 4yr   • KNEE ARTHROSCOPY Bilateral     meniscus repair   • LASIK     • ORCHIECTOMY     • TONSILLECTOMY     • WISDOM TOOTH EXTRACTION         Family History   Problem Relation Age of Onset   • Bone cancer Father    • Prostate cancer Father    • Cancer Father         prostate w/mets   • COPD Mother         emphysema-smoker   • Hypertension Mother    • Mental illness Neg Hx          Medications have been verified  Objective   /87   Pulse 63   Temp 98 °F (36 7 °C)   Resp 18   Ht 5' 10" (1 778 m)   Wt 94 8 kg (209 lb)   SpO2 95%   BMI 29 99 kg/m²        Physical Exam     Physical Exam  Vitals and nursing note reviewed  Constitutional:       General: He is not in acute distress  Appearance: Normal appearance   He is not ill-appearing  HENT:      Head: Normocephalic and atraumatic  Right Ear: Tympanic membrane, ear canal and external ear normal       Left Ear: Tympanic membrane, ear canal and external ear normal       Nose: Congestion present  Mouth/Throat:      Mouth: Mucous membranes are moist       Pharynx: Oropharynx is clear  No oropharyngeal exudate or posterior oropharyngeal erythema  Eyes:      General:         Right eye: No discharge  Left eye: No discharge  Extraocular Movements: Extraocular movements intact  Conjunctiva/sclera: Conjunctivae normal       Pupils: Pupils are equal, round, and reactive to light  Cardiovascular:      Rate and Rhythm: Normal rate and regular rhythm  Pulses: Normal pulses  Heart sounds: Normal heart sounds  Pulmonary:      Effort: Pulmonary effort is normal       Breath sounds: Normal breath sounds  Skin:     General: Skin is warm  Capillary Refill: Capillary refill takes less than 2 seconds  Neurological:      General: No focal deficit present  Mental Status: He is alert and oriented to person, place, and time

## 2023-01-30 NOTE — PATIENT INSTRUCTIONS
Use Zaditor eyedrops as instructed    Discussed no signs of bacterial infection, to continue over-the-counter cough and cold medication, maintain adequate fluid hydration and rest

## 2023-02-01 ENCOUNTER — OFFICE VISIT (OUTPATIENT)
Dept: FAMILY MEDICINE CLINIC | Facility: CLINIC | Age: 63
End: 2023-02-01

## 2023-02-01 VITALS
BODY MASS INDEX: 29.99 KG/M2 | TEMPERATURE: 97.2 F | RESPIRATION RATE: 18 BRPM | DIASTOLIC BLOOD PRESSURE: 80 MMHG | SYSTOLIC BLOOD PRESSURE: 124 MMHG | WEIGHT: 209 LBS | HEART RATE: 54 BPM | OXYGEN SATURATION: 94 %

## 2023-02-01 DIAGNOSIS — J20.9 ACUTE BRONCHITIS, UNSPECIFIED ORGANISM: Primary | ICD-10-CM

## 2023-02-01 DIAGNOSIS — J45.990 EXERCISE-INDUCED BRONCHOSPASM: ICD-10-CM

## 2023-02-01 RX ORDER — ALBUTEROL SULFATE 90 UG/1
2 AEROSOL, METERED RESPIRATORY (INHALATION) EVERY 6 HOURS PRN
Qty: 8 G | Refills: 0 | Status: SHIPPED | OUTPATIENT
Start: 2023-02-01

## 2023-02-01 RX ORDER — AZITHROMYCIN 250 MG/1
TABLET, FILM COATED ORAL
Qty: 6 TABLET | Refills: 0 | Status: SHIPPED | OUTPATIENT
Start: 2023-02-01 | End: 2023-02-06

## 2023-02-01 NOTE — PROGRESS NOTES
Assessment/Plan:    Recommended Flonase OTC for sinus congestion  Take medications as directed  F/u for any persistent/worsening symptoms      1  Acute bronchitis, unspecified organism  -     albuterol (ProAir HFA) 90 mcg/act inhaler; Inhale 2 puffs every 6 (six) hours as needed for wheezing  -     azithromycin (ZITHROMAX) 250 mg tablet; 2 tabs PO day 1, then 1 tab PO days 2-5    2  Exercise-induced bronchospasm  Assessment & Plan:  Has been stable, has not needed an inhaler in several years           Depression Screening and Follow-up Plan: Patient was screened for depression during today's encounter  They screened negative with a PHQ-2 score of 0  There are no Patient Instructions on file for this visit  No follow-ups on file  Subjective:      Patient ID: Checo Madrigal is a 60 y o  male  Chief Complaint   Patient presents with   • Asthma     Sas/cma       Has had URI Symptoms for the past 10 days  His chest feels very tight  Cough is dry, was more productive  He is not SOB or wheezing  Ear feels clogged     Cough  This is a new problem  The current episode started in the past 7 days  The problem has been gradually improving  The problem occurs every few minutes  The cough is non-productive and productive of brown sputum  Associated symptoms include ear congestion and ear pain (clogged)  Pertinent negatives include no chest pain, chills, fever, headaches, heartburn, hemoptysis, myalgias, nasal congestion, postnasal drip, rash, rhinorrhea, sore throat, shortness of breath, sweats, weight loss or wheezing  The symptoms are aggravated by cold air, dust and exercise  The following portions of the patient's history were reviewed and updated as appropriate: allergies, current medications, past family history, past medical history, past social history, past surgical history and problem list     Review of Systems   Constitutional: Positive for fatigue   Negative for chills, fever and weight loss  HENT: Positive for ear pain (clogged)  Negative for postnasal drip, rhinorrhea and sore throat  Respiratory: Positive for cough and chest tightness  Negative for hemoptysis, shortness of breath and wheezing  Cardiovascular: Negative for chest pain  Gastrointestinal: Negative for heartburn  Musculoskeletal: Negative for myalgias  Skin: Negative for rash  Neurological: Negative for headaches  Current Outpatient Medications   Medication Sig Dispense Refill   • albuterol (ProAir HFA) 90 mcg/act inhaler Inhale 2 puffs every 6 (six) hours as needed for wheezing 8 g 0   • azithromycin (ZITHROMAX) 250 mg tablet 2 tabs PO day 1, then 1 tab PO days 2-5 6 tablet 0   • ketotifen (ZADITOR) 0 025 % ophthalmic solution Administer 1 drop to the right eye 2 (two) times a day 5 mL 0   • Oracea 40 MG capsule Take 40 mg by mouth daily     • Soolantra 1 % CREA APPLY THIN LAYER TO FACE AT BEDTIME     • triamcinolone (KENALOG) 0 1 % cream APPLY TO AFFECTED AREA TWICE DAILY TO BODY X2 WEEKS  THEN AS NEEDED FOR FLARES  AVOID FACE AND GROIN     • bisacodyl (FLEET) 10 MG/30ML ENEM Insert 30 mL (10 mg total) into the rectum once for 1 dose Take morning of biopsy 30 mL 0     No current facility-administered medications for this visit  Objective:    /80   Pulse (!) 54   Temp (!) 97 2 °F (36 2 °C)   Resp 18   Wt 94 8 kg (209 lb) Comment: per pt    sas/cma  SpO2 94%   BMI 29 99 kg/m²        Physical Exam  Vitals and nursing note reviewed  Constitutional:       Appearance: Normal appearance  He is well-developed  HENT:      Right Ear: Ear canal normal  Tympanic membrane is injected and bulging  Left Ear: Tympanic membrane and ear canal normal       Nose:      Right Sinus: No maxillary sinus tenderness  Left Sinus: No maxillary sinus tenderness  Cardiovascular:      Rate and Rhythm: Normal rate and regular rhythm  Heart sounds: Normal heart sounds   No murmur heard   Pulmonary:      Effort: Pulmonary effort is normal       Breath sounds: Normal breath sounds  Skin:     General: Skin is warm and dry  Neurological:      Mental Status: He is alert     Psychiatric:         Mood and Affect: Mood normal          Behavior: Behavior normal                 MANNY Mcdonough

## 2023-02-09 ENCOUNTER — PROCEDURE VISIT (OUTPATIENT)
Dept: UROLOGY | Facility: CLINIC | Age: 63
End: 2023-02-09

## 2023-02-09 VITALS
HEART RATE: 95 BPM | OXYGEN SATURATION: 96 % | WEIGHT: 203.4 LBS | HEIGHT: 70 IN | RESPIRATION RATE: 16 BRPM | SYSTOLIC BLOOD PRESSURE: 138 MMHG | DIASTOLIC BLOOD PRESSURE: 78 MMHG | BODY MASS INDEX: 29.12 KG/M2

## 2023-02-09 DIAGNOSIS — R97.20 ELEVATED PSA: Primary | ICD-10-CM

## 2023-02-09 RX ORDER — CEFTRIAXONE 1 G/1
1000 INJECTION, POWDER, FOR SOLUTION INTRAMUSCULAR; INTRAVENOUS ONCE
Status: COMPLETED | OUTPATIENT
Start: 2023-02-09 | End: 2023-02-09

## 2023-02-09 RX ADMIN — CEFTRIAXONE 1000 MG: 1 INJECTION, POWDER, FOR SOLUTION INTRAMUSCULAR; INTRAVENOUS at 08:51

## 2023-02-09 NOTE — PROGRESS NOTES
Office TRUS-guided Prostate Biopsy Procedure Note    Indication    Elevated PSA    Informed consent   The risks, benefits and alternatives to TRUS-guided prostate biopsy were conveyed to the patient prior to performing the procedure  A discussion of the risks of the procedure included, but was not limited to: pain, hematuria, hematochezia, hematospermia, infection, and the possibility of a non-diagnostic biopsy  The patient was given the opportunity to have his questions answered and there was no perceived barrier to education  Antibiotic prophylaxis   The patient received the following antibiotics at least 30 minutes prior to undergoing biopsy: Ciprofloxacin 500 mg PO x2   1 g intramuscular Rocephin    Rectal cleansing  The patient was instructed to perform an evacuating rectal enema 1-2 hours prior to biopsy  Local anesthesia  Topical 2% lidocaine jelly was applied liberally to the anus and rectum and allowed to dwell for at least 5 min prior to starting the procedure  After insertion of the TRUS probe, 10 mL of 2% lidocaine solution was injected with ultrasound guidance at the  junction of the prostate and seminal vesicles  The anesthetic was allowed to dwell for at least 2 minutes prior to biopsy  Transrectal ultrasonography  The patient was placed in the left lateral decubitus position  After an attentive digital rectal examination, a 7 5 mHz sidefire ultrasound probe was gently inserted into the rectum and biplanar imaging of the prostate was done with the findings noted below  Images were taken of any abnormal findings and also to document prostate size      Bladder  The bladder base appeared normal     Prostate  Digital rectal exam findings:  - no palpable disease    Ultrasound size measurements:  -Volume:  42 cm3    Ultrasound findings:  -Cysts: None  -Masses: None  -Median lobe: absent    Clinical stage (assuming a positive biopsy):   -T1c     TRUS-guided needle biopsy  Using an 18 gauge biopsy needle and ultrasound guidance, the following biopsies were taken:    1 core(s) from the left lateral base  1 core(s) from the left lateral mid-gland  1 core(s) from the right middle base  1 core(s) from the right lateral base  1 core(s) from the left lateral mid-gland  1 core(s) from the left middle mid-gland  1 core(s) from the right middle mid-gland  1 core(s) from the right lateral mid-gland  1 core(s) from the left lateral apex  1 core(s) from the left middle apex  1 core(s) from the right middle apex  1 core(s) from the right lateral apex    Total number of cores: 12                Complications  There were no procedural complications  Disposition  The patient was dismissed to home after 30 minutes of observation in stable condition  Post-procedure instructions: Today he underwent an uncomplicated transrectal ultrasound-guided biopsy of the prostate, following a periprosthetic nerve block  I reviewed the normal postprocedure a course including bleeding per recutm, hematuria, and hematospermia  I instructed him to complete his course of antibiotics as prescribed  Instructed him to call with fever greater than 101, chills, nausea, vomiting, poorly controlled pain  His followup was scheduled in approximately 2 weeks' time to review the pathology

## 2023-02-20 ENCOUNTER — OFFICE VISIT (OUTPATIENT)
Dept: FAMILY MEDICINE CLINIC | Facility: CLINIC | Age: 63
End: 2023-02-20

## 2023-02-20 VITALS
SYSTOLIC BLOOD PRESSURE: 130 MMHG | HEART RATE: 56 BPM | HEIGHT: 70 IN | WEIGHT: 206 LBS | DIASTOLIC BLOOD PRESSURE: 80 MMHG | TEMPERATURE: 97 F | RESPIRATION RATE: 20 BRPM | BODY MASS INDEX: 29.49 KG/M2

## 2023-02-20 DIAGNOSIS — J45.990 EXERCISE-INDUCED BRONCHOSPASM: Primary | ICD-10-CM

## 2023-02-20 RX ORDER — PREDNISONE 10 MG/1
TABLET ORAL
Qty: 30 TABLET | Refills: 0 | Status: SHIPPED | OUTPATIENT
Start: 2023-02-20 | End: 2023-03-08

## 2023-02-20 NOTE — PROGRESS NOTES
Assessment/Plan:    Take Prednisone as directed  Continue albuterol prn  F/u for any persistent/worsening symptoms    1  Exercise-induced bronchospasm  -     predniSONE 10 mg tablet; 4 pills daily x 3 days, 3 pills days x 3 days, 2 pills daily x 3 days, 1 pill daily x 3 days Take with food          There are no Patient Instructions on file for this visit  Return if symptoms worsen or fail to improve  Subjective:      Patient ID: Estelle Wild is a 60 y o  male  Chief Complaint   Patient presents with   • Cough   • Shortness of Breath     Follow up  States that he is having some shortness of breath [id number]       Following up on asthma  He has been using albuterol as needed, which helps, but seems to wear off quickly  He has persistent cough and shortness of breath  No chest pains, light headedness, dizziness     Cough  This is a recurrent problem  The current episode started more than 1 month ago  Associated symptoms include shortness of breath and wheezing  Pertinent negatives include no chest pain, chills, ear congestion, ear pain, fever, headaches, heartburn, hemoptysis, myalgias, nasal congestion, postnasal drip, rash, rhinorrhea, sore throat, sweats or weight loss  The symptoms are aggravated by cold air, dust and exercise  The following portions of the patient's history were reviewed and updated as appropriate: allergies, current medications, past family history, past medical history, past social history, past surgical history and problem list     Review of Systems   Constitutional: Negative for chills, fever and weight loss  HENT: Negative for ear pain, postnasal drip, rhinorrhea and sore throat  Respiratory: Positive for cough, shortness of breath and wheezing  Negative for hemoptysis  Cardiovascular: Negative for chest pain  Gastrointestinal: Negative for heartburn  Musculoskeletal: Negative for myalgias  Skin: Negative for rash     Neurological: Negative for headaches  Current Outpatient Medications   Medication Sig Dispense Refill   • albuterol (ProAir HFA) 90 mcg/act inhaler Inhale 2 puffs every 6 (six) hours as needed for wheezing 8 g 0   • ketotifen (ZADITOR) 0 025 % ophthalmic solution Administer 1 drop to the right eye 2 (two) times a day 5 mL 0   • Oracea 40 MG capsule Take 40 mg by mouth daily     • predniSONE 10 mg tablet 4 pills daily x 3 days, 3 pills days x 3 days, 2 pills daily x 3 days, 1 pill daily x 3 days Take with food 30 tablet 0   • Soolantra 1 % CREA APPLY THIN LAYER TO FACE AT BEDTIME     • triamcinolone (KENALOG) 0 1 % cream APPLY TO AFFECTED AREA TWICE DAILY TO BODY X2 WEEKS  THEN AS NEEDED FOR FLARES  AVOID FACE AND GROIN       No current facility-administered medications for this visit  Objective:    /80   Pulse 56   Temp (!) 97 °F (36 1 °C)   Resp 20   Ht 5' 10" (1 778 m)   Wt 93 4 kg (206 lb)   BMI 29 56 kg/m²        Physical Exam  Vitals and nursing note reviewed  Constitutional:       Appearance: He is well-developed  Cardiovascular:      Rate and Rhythm: Normal rate and regular rhythm  Pulses: Normal pulses  Heart sounds: Normal heart sounds  No murmur heard  Pulmonary:      Effort: Pulmonary effort is normal       Breath sounds: Normal breath sounds  Skin:     General: Skin is warm and dry  Neurological:      Mental Status: He is alert     Psychiatric:         Mood and Affect: Mood normal          Behavior: Behavior normal                 MANNY Pan

## 2023-02-23 DIAGNOSIS — J20.9 ACUTE BRONCHITIS, UNSPECIFIED ORGANISM: ICD-10-CM

## 2023-02-23 DIAGNOSIS — B30.9 ACUTE VIRAL CONJUNCTIVITIS OF RIGHT EYE: ICD-10-CM

## 2023-02-23 RX ORDER — ALBUTEROL SULFATE 90 UG/1
AEROSOL, METERED RESPIRATORY (INHALATION)
Qty: 18 G | Refills: 0 | Status: SHIPPED | OUTPATIENT
Start: 2023-02-23

## 2023-02-24 RX ORDER — KETOTIFEN FUMARATE 0.35 MG/ML
SOLUTION/ DROPS OPHTHALMIC
Qty: 5 ML | Refills: 0 | Status: SHIPPED | OUTPATIENT
Start: 2023-02-24

## 2023-03-07 ENCOUNTER — OFFICE VISIT (OUTPATIENT)
Dept: UROLOGY | Facility: CLINIC | Age: 63
End: 2023-03-07

## 2023-03-07 VITALS
DIASTOLIC BLOOD PRESSURE: 82 MMHG | SYSTOLIC BLOOD PRESSURE: 130 MMHG | BODY MASS INDEX: 29.49 KG/M2 | WEIGHT: 206 LBS | HEIGHT: 70 IN

## 2023-03-07 DIAGNOSIS — C61 PROSTATE CANCER (HCC): Primary | ICD-10-CM

## 2023-03-07 RX ORDER — ACETAMINOPHEN 325 MG/1
975 TABLET ORAL ONCE
OUTPATIENT
Start: 2023-03-07 | End: 2023-03-07

## 2023-03-07 RX ORDER — GABAPENTIN 100 MG/1
100 CAPSULE ORAL ONCE
OUTPATIENT
Start: 2023-03-07 | End: 2023-03-07

## 2023-03-07 NOTE — PATIENT INSTRUCTIONS
Roger have elected for a Robotic Prostatectomy to treat your prostate cancer  In the next few days, you will receive a phone call from my surgical scheduler  She will discuss selecting a date for surgery, and provide you with our preoperative surgical packet  This includes information to help prepare you for surgery      I encourage you to begin preparing for the procedure by watching our practice's preoperative educational videos here:     [email or web address]          Eliza Waters MD,PhD  [facility]  Chief of Surgery, [place] [id number] [facility]  [phone]

## 2023-03-07 NOTE — PROGRESS NOTES
Referring Physician: MANNY Bright  A copy of this note was sent to the referring physician  Diagnoses and all orders for this visit:    Prostate cancer [facility])  -     Case request operating room: [address]; Standing  -     Case request operating room: [address]    Other orders  -     Place sequential compression device; Standing  -     Nursing communication Patient instructions: Please drink 1 bottle 1 hour after dinner and 1 bottle 1 hour before bed on the night before your surgery  Please drink 1 hour before arriving to hospital for surgery; Standing  -     bupivacaine liposomal (EXPAREL) 1 3 % injection 20 mL  -     ceFAZolin (ANCEF) 2,000 mg in sodium chloride 0 9 % 50 mL IVPB  -     acetaminophen (TYLENOL) tablet 975 mg  -     gabapentin (NEURONTIN) capsule 100 mg            Assessment and plan:       1  Newly diagnosed unfavorable intermediate risk prostate cancer  -cT1c  -Ayala 4+3 equal 7 (transrectal biopsy, February 9, 2023), 3 of 12 total positive cores, maximum core positivity: 55%  -Multiparametric MRI no radiographically detectable disease within the prostate  -Pretreatment PSA: 5 8    2  Right undescended testicle  -That is post open inguinal orchiectomy    3  History of pediatric bilateral inguinal hernia repairs      We discussed the natural history of prostate cancer, the [employer] grading system, and the patient's current staging  He was provided with a copy of his biopsy report  We discussed the options for managing newly diagnosed prostate cancer including active surveillance for low risk disease, radical prostatectomy, and pelvic radiation therapy  We discussed each of these management strategies in detail, with particular emphasis to how they do or do not apply to the patient's current staging   A discussion was guided using criteria for [address] [place] [address] (NCCN) to stratify patient's according to low, intermediate, or high-risk prostate cancer  We discussed the goals of care as #1 being cancer cure, and #2 being preservation of urinary control and erectile function  With regards to surgical resection, we discussed the benefits and risks, including but not limited to bleeding which may or may not require blood transfusion, infection, injury to other structures (bladder, ureters, rectum, nerves, & vascular /neural structures), ileus, DVT/PE, MI, CVA, urinary incontinence, impotence, failure to completely eradicate the tumor/positive margins or the need for further therapy, and death  Risks of severe urinary incontinence necessitating an AUS 1-3% and minor stress urinary incontinence requiring pads about 10-15% at 1 year were also discussed  The risk of impotence was also discussed in great detail  After surgery, his libido should be unchanged, he may or may not have normal spontaneous erections but that he will have a dry orgasm  Post operative problems including the need for adjuvant therapy were discussed  He had the opportunity to ask questions and his questions were answered to his satisfaction  We discussed the pros and cons risk benefits alternatives to robotic prostatectomy versus external beam radiation therapy with 6 months of androgen deprivation therapy  Pros and cons of each were extensively discussed  Patient has elected schedule robotic prostatectomy  I think he is an excellent candidate  He would like to schedule surgery in June which I think is generally appropriate    We will begin the surgical scheduling process and I will schedule him for a preop visit with our advanced practitioner team prior to the operation as well      Steph Traylor MD      Chief Complaint     Biopsy follow-up      History of Present Illness     Da Broussard is a 60 y o  male returns in follow-up for elevated PSA status post transrectal biopsy    Detailed Urologic History     - please refer to HPI    Review of Systems     Review of Systems   Constitutional: Negative for activity change and fatigue  HENT: Negative for congestion  Eyes: Negative for visual disturbance  Respiratory: Negative for shortness of breath and wheezing  Cardiovascular: Negative for chest pain and leg swelling  Gastrointestinal: Negative for abdominal pain  Endocrine: Negative for polyuria  Genitourinary: Negative for dysuria, flank pain, hematuria and urgency  Musculoskeletal: Negative for back pain  Allergic/Immunologic: Negative for immunocompromised state  Neurological: Negative for dizziness and numbness  Psychiatric/Behavioral: Negative for dysphoric mood  All other systems reviewed and are negative  AUA SYMPTOM SCORE    Flowsheet Row Most Recent Value   AUA SYMPTOM SCORE    How often have you had a sensation of not emptying your bladder completely after you finished urinating? 0 (P)     How often have you had to urinate again less than two hours after you finished urinating? 0 (P)     How often have you found you stopped and started again several times when you urinate? 0 (P)     How often have you found it difficult to postpone urination? 1 (P)     How often have you had a weak urinary stream? 0 (P)     How often have you had to push or strain to begin urination? 0 (P)     How many times did you most typically get up to urinate from the time you went to bed at night until the time you got up in the morning? 1 (P)     Quality of Life: If you were to spend the rest of your life with your urinary condition just the way it is now, how would you feel about that? 1 (P)     AUA SYMPTOM SCORE 2 (P)             Allergies     No Known Allergies    Physical Exam       Physical Exam  Constitutional:       General: He is not in acute distress  Appearance: He is well-developed  HENT:      Head: Normocephalic and atraumatic     Cardiovascular:      Comments: Negative lower extremity edema  Pulmonary:      Effort: Pulmonary effort is normal       Breath sounds: Normal breath sounds  Abdominal:      Palpations: Abdomen is soft  Musculoskeletal:         General: Normal range of motion  Cervical back: Normal range of motion  Skin:     General: Skin is warm  Neurological:      Mental Status: He is alert and oriented to person, place, and time  Psychiatric:         Behavior: Behavior normal            Vital Signs  Vitals:    03/07/23 1536   BP: 130/82   BP Location: Left arm   Patient Position: Sitting   Cuff Size: Adult   Weight: 93 4 kg (206 lb)   Height: 5' 10" (1 778 m)         Current Medications       Current Outpatient Medications:   •  albuterol (PROVENTIL HFA,VENTOLIN HFA) 90 mcg/act inhaler, INHALE 2 PUFFS EVERY 6 HOURS AS NEEDED FOR WHEEZING, Disp: 18 g, Rfl: 0  •  Oracea 40 MG capsule, Take 40 mg by mouth daily, Disp: , Rfl:   •  predniSONE 10 mg tablet, 4 pills daily x 3 days, 3 pills days x 3 days, 2 pills daily x 3 days, 1 pill daily x 3 days Take with food, Disp: 30 tablet, Rfl: 0  •  Soolantra 1 % CREA, APPLY THIN LAYER TO FACE AT BEDTIME, Disp: , Rfl:   •  triamcinolone (KENALOG) 0 1 % cream, APPLY TO AFFECTED AREA TWICE DAILY TO BODY X2 WEEKS  THEN AS NEEDED FOR FLARES   AVOID FACE AND GROIN, Disp: , Rfl:       Active Problems     Patient Active Problem List   Diagnosis   • Osteoarthritis of knee   • Exercise-induced bronchospasm   • Primary localized osteoarthrosis of pelvic region   • Organic impotence   • S/P orchiectomy   • Elevated PSA   • Prostate cancer [facility])         Past Medical History     Past Medical History:   Diagnosis Date   • Arthritis     knees   • Colon polyp    • Extrinsic asthma    • Undescended right testis          Surgical History     Past Surgical History:   Procedure Laterality Date   • BILATERAL HIP ARTHROSCOPY      resurfaced   • COLONOSCOPY     • HERNIA REPAIR Bilateral     age 4yr   • KNEE ARTHROSCOPY Bilateral     meniscus repair   • LASIK • ORCHIECTOMY     • TONSILLECTOMY     • WISDOM TOOTH EXTRACTION           Family History     Family History   Problem Relation Age of Onset   • Bone cancer Father    • Prostate cancer Father    • Cancer Father         prostate w/mets   • COPD Mother         emphysema-smoker   • Hypertension Mother    • Mental illness Neg Hx          Social History     Social History     Social History     Tobacco Use   Smoking Status Former   • Packs/day: 1 00   • Years: 10 00   • Pack years: 10 00   • Types: Cigarettes   • Start date: 1971   • Quit date: 1982   • Years since quittin 2   Smokeless Tobacco Never   Tobacco Comments    quit 35 yrs ago         Pertinent Lab Values     Lab Results   Component Value Date    CREATININE 2022       Lab Results   Component Value Date    PSA 6 0 (H) 2022    PSA 5 8 (H) 2022    PSA 4 5 (H) 2022       @RESULTRCNT(1H])@      Pertinent Imaging      - n/a    Portions of the record may have been created with voice recognition software  Occasional wrong word or "sound a like" substitutions may have occurred due to the inherent limitations of voice recognition software  Read the chart carefully and recognize, using context, where substitutions have occurred

## 2023-03-17 ENCOUNTER — TELEPHONE (OUTPATIENT)
Dept: GASTROENTEROLOGY | Facility: CLINIC | Age: 63
End: 2023-03-17

## 2023-03-17 ENCOUNTER — PREP FOR PROCEDURE (OUTPATIENT)
Dept: GASTROENTEROLOGY | Facility: CLINIC | Age: 63
End: 2023-03-17

## 2023-03-17 DIAGNOSIS — Z86.010 HISTORY OF COLON POLYPS: Primary | ICD-10-CM

## 2023-03-17 NOTE — TELEPHONE ENCOUNTER
03/17/23  Screened by: King Gonsales    Referring Provider     Pre- Screening: There is no height or weight on file to calculate BMI  Has patient been referred for a routine screening Colonoscopy? yes  Is the patient between 39-70 years old? yes      Previous Colonoscopy yes   If yes:    Date: 2021    Facility:     Reason:       SCHEDULING STAFF: If the patient is between 39yrs-47yrs, please advise patient to confirm benefits/coverage with their insurance company for a routine screening colonoscopy, some insurance carriers will only cover at [address] or older  If the patient is over 66years old, please schedule an office visit  Does the patient want to see a Gastroenterologist prior to their procedure OR are they having any GI symptoms? no    Has the patient been hospitalized or had abdominal surgery in the past 6 months? no    Does the patient use supplemental oxygen? no    Does the patient take Coumadin, Lovenox, Plavix, Elliquis, Xarelto, or other blood thinning medication? no    Has the patient had a stroke, cardiac event, or stent placed in the past year? no    SCHEDULING STAFF: If patient answers NO to above questions, then schedule procedure  If patient answers YES to above questions, then schedule office appointment  Pt passed OA      If patient is between 45yrs - 49yrs, please advise patient that we will have to confirm benefits & coverage with their insurance company for a routine screening colonoscopy

## 2023-03-17 NOTE — TELEPHONE ENCOUNTER
Scheduled date of colonoscopy (as of today):4/18/23    Physician performing colonoscopy:Dr Garrett    Location of colonoscopy:[facility]     Clearances: N/A

## 2023-04-05 ENCOUNTER — TELEPHONE (OUTPATIENT)
Dept: UROLOGY | Facility: AMBULATORY SURGERY CENTER | Age: 63
End: 2023-04-05

## 2023-04-05 NOTE — TELEPHONE ENCOUNTER
I called pt this afternoon to discuss scheduling his RALP with Dr Kerr Nurse  There was no answer so I did leave a voicemail asking pt to call our office back to discuss

## 2023-04-12 NOTE — TELEPHONE ENCOUNTER
I called pt again this afternoon to discuss scheduling his procedure with Dr Lauryn Harrison  There was no answer again so I did leave another voicemail asking pt to call our office back to discuss

## 2023-04-14 ENCOUNTER — LAB REQUISITION (OUTPATIENT)
Dept: LAB | Facility: HOSPITAL | Age: 63
End: 2023-04-14

## 2023-04-14 DIAGNOSIS — C61 MALIGNANT NEOPLASM OF PROSTATE (HCC): ICD-10-CM

## 2023-04-14 DIAGNOSIS — R97.20 ELEVATED PROSTATE SPECIFIC ANTIGEN (PSA): ICD-10-CM

## 2023-04-17 RX ORDER — SODIUM CHLORIDE, SODIUM LACTATE, POTASSIUM CHLORIDE, CALCIUM CHLORIDE 600; 310; 30; 20 MG/100ML; MG/100ML; MG/100ML; MG/100ML
125 INJECTION, SOLUTION INTRAVENOUS CONTINUOUS
Status: CANCELLED | OUTPATIENT
Start: 2023-04-17

## 2023-05-04 LAB — SCAN RESULT: NORMAL

## 2023-05-26 ENCOUNTER — APPOINTMENT (OUTPATIENT)
Dept: LAB | Facility: CLINIC | Age: 63
End: 2023-05-26

## 2023-05-26 DIAGNOSIS — Z02.1 PRE-EMPLOYMENT HEALTH SCREENING EXAMINATION: ICD-10-CM

## 2023-05-26 LAB
MEV IGG SER QL IA: NORMAL
MUV IGG SER QL IA: NORMAL
RUBV IGG SERPL IA-ACNC: 133.4 IU/ML
VZV IGG SER QL IA: NORMAL

## 2023-05-28 ENCOUNTER — TELEPHONE (OUTPATIENT)
Dept: OTHER | Facility: OTHER | Age: 63
End: 2023-05-28

## 2023-05-28 LAB
GAMMA INTERFERON BACKGROUND BLD IA-ACNC: 0.03 IU/ML
M TB IFN-G BLD-IMP: NEGATIVE
M TB IFN-G CD4+ BCKGRND COR BLD-ACNC: -0.01 IU/ML
M TB IFN-G CD4+ BCKGRND COR BLD-ACNC: 0 IU/ML
MITOGEN IGNF BCKGRD COR BLD-ACNC: >10 IU/ML

## 2023-05-28 NOTE — TELEPHONE ENCOUNTER
Patient stated he got a second opinion and will not be doing the procedure  He will be doing active surveillance

## 2023-05-30 NOTE — TELEPHONE ENCOUNTER
[facility] like [facility] a separate [facility] the [facility] out  Patient not intrested in scheduling surgery at this point - ugo salcido

## 2023-07-11 ENCOUNTER — OFFICE VISIT (OUTPATIENT)
Dept: OBGYN CLINIC | Facility: CLINIC | Age: 63
End: 2023-07-11
Payer: COMMERCIAL

## 2023-07-11 ENCOUNTER — APPOINTMENT (OUTPATIENT)
Dept: RADIOLOGY | Facility: CLINIC | Age: 63
End: 2023-07-11
Payer: COMMERCIAL

## 2023-07-11 VITALS
HEIGHT: 70 IN | WEIGHT: 205 LBS | SYSTOLIC BLOOD PRESSURE: 145 MMHG | HEART RATE: 90 BPM | BODY MASS INDEX: 29.35 KG/M2 | DIASTOLIC BLOOD PRESSURE: 60 MMHG

## 2023-07-11 DIAGNOSIS — M25.511 RIGHT SHOULDER PAIN, UNSPECIFIED CHRONICITY: ICD-10-CM

## 2023-07-11 DIAGNOSIS — M19.011 OSTEOARTHRITIS OF GLENOHUMERAL JOINT, RIGHT: Primary | ICD-10-CM

## 2023-07-11 PROCEDURE — 20610 DRAIN/INJ JOINT/BURSA W/O US: CPT | Performed by: ORTHOPAEDIC SURGERY

## 2023-07-11 PROCEDURE — 99203 OFFICE O/P NEW LOW 30 MIN: CPT | Performed by: ORTHOPAEDIC SURGERY

## 2023-07-11 PROCEDURE — 73030 X-RAY EXAM OF SHOULDER: CPT

## 2023-07-11 RX ORDER — TRIAMCINOLONE ACETONIDE 40 MG/ML
40 INJECTION, SUSPENSION INTRA-ARTICULAR; INTRAMUSCULAR
Status: COMPLETED | OUTPATIENT
Start: 2023-07-11 | End: 2023-07-11

## 2023-07-11 RX ORDER — ERYTHROMYCIN 5 MG/G
OINTMENT OPHTHALMIC
COMMUNITY
Start: 2023-05-29

## 2023-07-11 RX ADMIN — TRIAMCINOLONE ACETONIDE 40 MG: 40 INJECTION, SUSPENSION INTRA-ARTICULAR; INTRAMUSCULAR at 12:15

## 2023-07-11 NOTE — PROGRESS NOTES
Assessment/Plan:  1. Osteoarthritis of glenohumeral joint, right  XR shoulder 2+ vw right    Ambulatory Referral to Physical Therapy        Scribe Attestation    I,:  Neal Chavira Maria Elena am acting as a scribe while in the presence of the attending physician.:       I,:  Johana Crowder MD personally performed the services described in this documentation    as scribed in my presence.:             Samm Neri and I reviewed his x-rays together. He does have evidence of severe glenohumeral osteoarthritis with a large osteophyte arising off the inferior humeral head. He also has severe arthritis of the acromioclavicular joint. This correlates well with my physical examination as he is quite limited in motion particularly with active abduction and forward flexion. Internal rotation is also limited significantly. I am pleased with the strength about the rotator cuff. Samm Neri states he has responded well to CSI and physical therapy in the past.  I did explain to the patient that ultimately he will likely require shoulder arthroplasty. He is not at that point in this time. Since he has responded well to steroid injection and therapy in the past I feel he would benefit from additional treatments. We discussed the risks and benefits of a steroid injection today. He was amenable to proceed. Verbal consent was obtained today. He tolerated the procedure well and postinjection instructions were provided. He may experience initial numbness from the anesthetic. I do ask that he give the steroid a full week to take effect. I did place an order for physical therapy. He can attend physical therapy over the next 4 to 6 weeks. If he shows improvement he can return to see me. Samm Neri had no further questions. Large joint arthrocentesis: R glenohumeral  [facility] Protocol:  Consent: Verbal consent obtained.   Risks and benefits: risks, benefits and alternatives were discussed  Consent given by: patient  Time out: Immediately prior to procedure a "time out" was called to verify the correct patient, procedure, equipment, support staff and site/side marked as required. Patient understanding: patient states understanding of the procedure being performed  Patient consent: the patient's understanding of the procedure matches consent given  Patient identity confirmed: verbally with patient    Supporting Documentation  Indications: pain   Procedure Details  Location: shoulder - R glenohumeral  Preparation: Patient was prepped and draped in the usual sterile fashion  Needle size: 22 G  Ultrasound guidance: no  Approach: posterior  Medications administered: 40 mg triamcinolone acetonide 40 mg/mL (4 mL bupivicaine . 5% injection)    Patient tolerance: patient tolerated the procedure well with no immediate complications  Dressing:  Sterile dressing applied            Subjective:   Brook Eisenmenger is a 60 y.o. male who presents today for evaluation of his right shoulder. Harriet Kandi states he has a known history of glenohumeral osteoarthritis. He recently returned to the gym and notices more stiffness about the right shoulder. He states he has difficulty with overhead activities or reaching behind his back. In general he will experience an intermittent global ache about the shoulder that is considered mild to moderate. He is unable to sleep on the right side due to pain from pressure. The pain will radiate laterally at times and into the posterior region. He denies distal paresthesias. He is better with rest and gentle stretching exercises. Harriet Chau states he has received a steroid injection in the shoulder approximately 10 years ago which provided significant relief. He also states physical therapy was able to produce gains in pain free motion. He is questioning if either option is available today. Review of Systems   Constitutional: Negative for chills, fever and unexpected weight change.    HENT: Negative for hearing loss, nosebleeds and sore throat. Eyes: Negative for pain, redness and visual disturbance. Respiratory: Negative for cough, shortness of breath and wheezing. Cardiovascular: Negative for chest pain, palpitations and leg swelling. Gastrointestinal: Negative for abdominal pain, nausea and vomiting. Endocrine: Negative for polyphagia and polyuria. Genitourinary: Negative for dysuria and hematuria. Musculoskeletal:        See HPI   Skin: Negative for rash and wound. Neurological: Negative for dizziness, numbness and headaches. Psychiatric/Behavioral: Negative for decreased concentration and suicidal ideas. The patient is not nervous/anxious. Past Medical History:   Diagnosis Date   • Arthritis     knees   • Cancer ([address])     stg 1-prostate   • Colon polyp    • Extrinsic asthma    • Undescended right testis        Past Surgical History:   Procedure Laterality Date   • BILATERAL HIP ARTHROSCOPY      resurfaced   • COLONOSCOPY     • HERNIA REPAIR Bilateral     age 4yr   • KNEE ARTHROSCOPY Bilateral     meniscus repair   • LASIK     • ORCHIECTOMY     • TONSILLECTOMY     • WISDOM TOOTH EXTRACTION         Family History   Problem Relation Age of Onset   • Bone cancer Father    • Prostate cancer Father    • Cancer Father         prostate w/mets   • COPD Mother         emphysema-smoker   • Hypertension Mother    • Mental illness Neg Hx        Social History     Occupational History   • Not on file   Tobacco Use   • Smoking status: Former     Packs/day: 1.00     Years: 10.00     Total pack years: 10.00     Types: Cigarettes     Start date: 1971     Quit date: 1982     Years since quittin.5   • Smokeless tobacco: Never   • Tobacco comments:     quit 35 yrs ago   Vaping Use   • Vaping Use: Never used   Substance and Sexual Activity   • Alcohol use:  Yes     Alcohol/week: 2.0 standard drinks of alcohol     Types: 2 Glasses of wine per week     Comment: social   • Drug use: Never   • Sexual activity: Not on file         Current Outpatient Medications:   •  albuterol (PROVENTIL HFA,VENTOLIN HFA) 90 mcg/act inhaler, INHALE 2 PUFFS EVERY 6 HOURS AS NEEDED FOR WHEEZING, Disp: 18 g, Rfl: 0  •  erythromycin (ILOTYCIN) ophthalmic ointment, PLEASE SEE ATTACHED FOR DETAILED DIRECTIONS (Patient not taking: Reported on 7/11/2023), Disp: , Rfl:   •  Multiple Vitamin (MULTI-VITAMIN DAILY PO), Take by mouth in the morning Balance of Nature, Disp: , Rfl:   •  Oracea 40 MG capsule, Take 40 mg by mouth daily, Disp: , Rfl:   •  Polyethylene Glycol 3350 (MIRALAX PO), Take by mouth 1 (one) time 238 gm with dulcolax, Disp: , Rfl:   •  Soolantra 1 % CREA, APPLY THIN LAYER TO FACE AT BEDTIME, Disp: , Rfl:   •  triamcinolone (KENALOG) 0.1 % cream, APPLY TO AFFECTED AREA TWICE DAILY TO BODY X2 WEEKS. THEN AS NEEDED FOR FLARES. AVOID FACE AND GROIN, Disp: , Rfl:     No Known Allergies    Objective:  Vitals:    07/11/23 1217   BP: 145/60   Pulse: 90       Right Shoulder Exam     Tenderness   The patient is experiencing no tenderness. Range of Motion   Active abduction: 120   External rotation: 30   Forward flexion: 130   Internal rotation 0 degrees: L4     Muscle Strength   Abduction: 5/5   Internal rotation: 5/5   External rotation: 5/5   Supraspinatus: 5/5   Subscapularis: 5/5   Biceps: 5/5     Other   Sensation: normal  Pulse: present (2+ radial)            Physical Exam  Vitals reviewed. Constitutional:       Appearance: He is well-developed. HENT:      Head: Normocephalic and atraumatic. Eyes:      General:         Right eye: No discharge. Left eye: No discharge. Conjunctiva/sclera: Conjunctivae normal.   Cardiovascular:      Rate and Rhythm: Regular rhythm. Pulmonary:      Effort: Pulmonary effort is normal. No respiratory distress. Musculoskeletal:      Cervical back: Normal range of motion and neck supple. Skin:     General: Skin is warm and dry.    Neurological:      Mental Status: He is alert and oriented to person, place, and time. Psychiatric:         Behavior: Behavior normal.         I have personally reviewed pertinent films in PACS and my interpretation is as follows:  X-rays of the right shoulder taken today demonstrate severe glenohumeral and acromioclavicular osteoarthritis. There is a large osteophyte arising off the inferior humeral head. There is no evidence of acute fracture, dislocation or other osseous abnormality. This document was created using speech voice recognition software. Grammatical errors, random word insertions, pronoun errors, and incomplete sentences are an occasional consequence of this system due to software limitations, ambient noise, and hardware issues. Any formal questions or concerns about content, text, or information contained within the body of this dictation should be directly addressed to the provider for clarification.

## 2023-07-12 ENCOUNTER — EVALUATION (OUTPATIENT)
Dept: PHYSICAL THERAPY | Facility: CLINIC | Age: 63
End: 2023-07-12
Payer: COMMERCIAL

## 2023-07-12 DIAGNOSIS — M19.011 OSTEOARTHRITIS OF GLENOHUMERAL JOINT, RIGHT: ICD-10-CM

## 2023-07-12 PROCEDURE — 97161 PT EVAL LOW COMPLEX 20 MIN: CPT | Performed by: PHYSICAL THERAPIST

## 2023-07-12 NOTE — PROGRESS NOTES
PT Evaluation     Today's date: 2023  Patient name: Genesis Barrera  : 1960  MRN: [id number]  Referring provider: [facility]  Dx:   Encounter Diagnosis     ICD-10-CM    1. Osteoarthritis of glenohumeral joint, right  M19.011 Ambulatory Referral to Physical Therapy                     Assessment  Assessment details: 2023: Genesis Barrera is a 60 y.o. male who presents with pain, decreased strength, decreased ROM, decreased joint mobility, postural dysfunction and imaging indicating advanced OA R shoulder. Due to these impairments, patient has difficulty performing ADL's, recreational activities, work-related activities, lifting/carrying, reaching. Patient's clinical presentation is consistent with their referring diagnosis of Osteoarthritis of glenohumeral joint, right. Cervical mechanical assessment indicative of some postural contribution to limited shoulder ROM (reduction of forward head improves flexion AROM), but cervical derangement is not suspected. He merely has additional cervical spine dysfunction and fixed forward head posture. Plan: Ambulatory Referral to Physical Therapy  Patient has been educated in home exercise program and plan of care. Patient would benefit from skilled physical therapy services to address their aforementioned functional limitations and progress towards prior level of function and independence with home exercise program.     Impairments: abnormal or restricted ROM, activity intolerance, impaired physical strength, lacks appropriate home exercise program, pain with function, scapular dyskinesis and poor posture     Goals  Short Term Goals to be met in 4 weeks (2023)  1. Pt to be independent w/ prelimary HEP  2. PROM R shoulder to improve by 20 degrees in all planes to prepare for improved AROM/functional mobility.   3. Improve cervical AROM to min holm w/o pain for rotation and retraction; mod holm or better to reduce forward head posture add assist w/ shoulder AROM. 4. Improve AROM flexion and abduction to 135 or better to improve subjective function by at least 25%. Long Term Goals to be met in 12 weeks (10/4/2023)  1. AROM R shoulder to be at least 150 flexion; 120 abd; 75 ER and to L5 IR to allow ease w/ ADL's and IADL's  2. Improve strength to 4+/5 or better to allow lifting, reaching and carrying w/o c/o.  3. Improved FOTO score to 69 or better. 4. 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 Plan  Plan details:       Patient would benefit from: PT eval and skilled physical therapy  Planned modality interventions: cryotherapy, thermotherapy: hydrocollator packs and unattended electrical stimulation  Planned therapy interventions: manual therapy, neuromuscular re-education, therapeutic exercise, therapeutic activities, home exercise program, stretching, patient education and postural training  Frequency: 2x week (2-3x/week)  Plan of Care beginning date: 2023  Plan of Care expiration date: 10/4/2023  Treatment plan discussed with: patient        Subjective Evaluation    History of Present Illness  Mechanism of injury: Subjective 2023: Pt reports 20 years of R shoulder pain and limited functional ROM overhead and behind his body, even just reaching overhead without weight. He had a steroid injection yesterday. Previous steroid injections have helped. OA is severe and pt understands TSA is in his future. He also has B/L knee OA, but has had prior mensicus surgery B/L and hip OA with surgery for resurfacing. He gets "gel" injections in his knees every 6 months.   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 Diagnostic Tests  Ultrasound findings: abnormalTreatments  Previous treatment: injection treatment  Current treatment: physical therapy        Objective        Posture:   2023: moderate forward head posture; hard end feel w/ attempt to correct due to postural dysfunction    PROM: supine  R  L     2023  Shoulder flex        145*  170  Shoulder abd       95*  170  ER @90 abd  55*  80  IR @90 abd  15*  50    AROM:  stand  R  L     2023  Shoulder flex        120*  160  Shoulder abd       88*  160  Functional IR  SI jt*  L1  ER @ 90 abd  55*  80      Cervical AROM loss:    2023  Flexion: MIN     Extension:  PHILIPPE    R rotation:  MOD 50%    L rotation:  MOD 40%    R Sidebend:  PHILIPPE 75%    L Sidebend: PHILIPPE 75%    Retraction:  MOD 50%       MMT:    R  L     2023  Shoulder flex  4+/5in avail ROM 5/5  Shoulder abd (C5) 4+/5in avail ROM 5/5  Internal Rotation 4+/5  5/5  External Rotation 4/5in avail ROM 5/5  Elbow flex (bicep C6) 5/5  5/5  Elbow ext (tricep C7) 5/5  5/5    MECHANICAL ASSESSMENT:   2023  Rep c/s retraction w/ OP 5" x10 =  Increases AROM shld flexion; NE pain      DERMATOMAL TESTIN2023 - WNL C2-T1 B/L Tenderness/Palpation:    JOINT MOBILITY:  2023: GH joint mobility mod holm inferior/posterior glide    FUNCTION:  2023: Pt is limited with AROM all planes abd/IR> flex/ER - pain w/ OH reach, cannot reach behind plane of body - limits dressing, reaching, job (referee/umpire; works on boilers); wakes if he rolls onto R side at night.          Precautions:   Past Medical History:   Diagnosis Date   • Arthritis     knees   • Cancer ([address])     stg 1-prostate   • Colon polyp    • Extrinsic asthma    • Undescended right testis    Access Code: [email or web address]  URL: [email or web address]  Date: 07/12/2023  Prepared by:  Avila Moreno    Exercises  - Seated Passive Cervical Retraction  - 2 x daily - 15 reps - 5 hold  - Supine Shoulder Flexion Extension AAROM with Dowel  - 2 x daily - 10 reps - 5 hold  - Supine Shoulder External Rotation with Dowel  - 2 x daily - 10 reps - 5 hold  - Sleeper Stretch (Mirrored)  - 2 x daily - 3 reps - 30 hold  - Standing Shoulder Abduction ROM with Dowel (Mirrored)  - 2 x daily - 2 sets - 10 reps  - Standing shoulder flexion wall slides  - 2 x daily - 2 sets - 10 reps - 5 hold  - Standing Sleeper Stretch at [facility] [place] (Mirrored)  - 2 x daily - 3 reps - 30 hold    SOC: 7/12/2023  FOTO: 7/12/2023  POC EXP: 10/4/2023  DAILY TREATMENT LOG:  date 7/12/2023       Visit # /auth                                Neuro Re-Ed                B/L UE ext w/ tB        B/L UE ER w/ TB        RS ball at wall                                Ther Ex 15'       cerv retr w/ self OP 5"x10       Wall slides b/L flexion 1x10       Stand cane abd 1x10       Supine flexion w/ cane 5"x10       ER w/ cane 90/90 supine 5"x10       SL Abd AROM 1x10       Sleeper stretch 30"x3 sidelying; showed sitting too       Corner stretch                HEP Issued & reviewed       Ther Activity                        Gait Training                        Modalities

## 2023-07-17 ENCOUNTER — OFFICE VISIT (OUTPATIENT)
Dept: PHYSICAL THERAPY | Facility: CLINIC | Age: 63
End: 2023-07-17
Payer: COMMERCIAL

## 2023-07-17 DIAGNOSIS — M19.011 OSTEOARTHRITIS OF GLENOHUMERAL JOINT, RIGHT: Primary | ICD-10-CM

## 2023-07-17 PROCEDURE — 97110 THERAPEUTIC EXERCISES: CPT | Performed by: PHYSICAL THERAPIST

## 2023-07-17 NOTE — PROGRESS NOTES
Daily Note     Today's date: 2023  Patient name: Alvaro Wu  : 1960  MRN: [id number]  Referring provider: Harris Bland*  Dx:   Encounter Diagnosis     ICD-10-CM    1. Osteoarthritis of glenohumeral joint, right  M19.011                      Subjective: Pt didn't really get to his HEP, was very busy over the weekend. Objective: See treatment diary below      Assessment: Tolerated treatment well and reports understanding of HEP reviewed today. . Patient would benefit from continued PT  Pt reports he will have and easier time finding time to do standing ex vs lying, so gave him multiple options on HEP. Plan: Continue per plan of care. Precautions:   Past Medical History:   Diagnosis Date   • Arthritis     knees   • Cancer ([address])     stg 1-prostate   • Colon polyp    • Extrinsic asthma    • Undescended right testis    Access Code: [email or web address]  URL: [email or web address]  Date: 2023  Prepared by:  Ally Aguilera    Exercises  - Seated Passive Cervical Retraction  - 2 x daily - 15 reps - 5 hold  - Supine Shoulder Flexion Extension AAROM with Dowel  - 2 x daily - 10 reps - 5 hold  - Supine Shoulder External Rotation with Dowel  - 2 x daily - 10 reps - 5 hold  - Sleeper Stretch (Mirrored)  - 2 x daily - 3 reps - 30 hold  - Standing Shoulder Abduction ROM with Dowel (Mirrored)  - 2 x daily - 2 sets - 10 reps  - Standing shoulder flexion wall slides  - 2 x daily - 2 sets - 10 reps - 5 hold  - Standing Sleeper Stretch at [facility] [place] (Mirrored)  - 2 x daily - 3 reps - 30 hold    SOC: 2023  FOTO: 2023  POC EXP: 10/4/2023  DAILY TREATMENT LOG:  date 2023      Visit # /auth  2                              Neuro Re-Ed                B/L UE ext w/ tB  RTB 2x10      B/L UE ER w/ TB        RS ball at wall                                Ther Ex 15' 40'      cerv retr w/ self OP 5"x10 5"x10      Wall slides b/L flexion 1x10 5" 2x10      Stand cane abd 1x10 2x10 Supine flexion w/ cane 5"x10 5" 2x10      ER w/ cane 90/90 supine 5"x10 10'x5      SL Abd AROM 1x10 2x10      Sleeper stretch 30"x3 sidelying; showed sitting too 10"x5      B/L Corner stretch  10"x5              HEP Issued & reviewed       Ther Activity                        Gait Training                        Modalities

## 2023-07-19 ENCOUNTER — APPOINTMENT (OUTPATIENT)
Dept: PHYSICAL THERAPY | Facility: CLINIC | Age: 63
End: 2023-07-19
Payer: COMMERCIAL

## 2023-07-20 ENCOUNTER — OFFICE VISIT (OUTPATIENT)
Dept: PHYSICAL THERAPY | Facility: CLINIC | Age: 63
End: 2023-07-20
Payer: COMMERCIAL

## 2023-07-20 DIAGNOSIS — M19.011 OSTEOARTHRITIS OF GLENOHUMERAL JOINT, RIGHT: Primary | ICD-10-CM

## 2023-07-20 PROCEDURE — 97110 THERAPEUTIC EXERCISES: CPT | Performed by: PHYSICAL THERAPIST

## 2023-07-20 PROCEDURE — 97112 NEUROMUSCULAR REEDUCATION: CPT | Performed by: PHYSICAL THERAPIST

## 2023-07-20 NOTE — PROGRESS NOTES
Daily Note     Today's date: 2023  Patient name: Nahid Mehta  : 1960  MRN: [id number]  Referring provider: Creed Frankel*  Dx:   Encounter Diagnosis     ICD-10-CM    1. Osteoarthritis of glenohumeral joint, right  M19.011                      Subjective: Pt reports he has not been doing his HEP, too busy, but that what we're doing seems to be helpful. He reports improving ROM. Objective: See treatment diary below      Assessment: Tolerated treatment well and is able to advance exercises w/o c/o pain. . Patient would benefit from continued PT and continues w/ restricted R shoulder ROM. Improvement in this ROM is noted. Plan: Continue per plan of care. Precautions:   Past Medical History:   Diagnosis Date   • Arthritis     knees   • Cancer ([address])     stg 1-prostate   • Colon polyp    • Extrinsic asthma    • Undescended right testis    Access Code: [email or web address]  URL: [email or web address]  Date: 2023  Prepared by:  Parker Carlisle    Exercises  - Seated Passive Cervical Retraction  - 2 x daily - 15 reps - 5 hold  - Supine Shoulder Flexion Extension AAROM with Dowel  - 2 x daily - 10 reps - 5 hold  - Supine Shoulder External Rotation with Dowel  - 2 x daily - 10 reps - 5 hold  - Sleeper Stretch (Mirrored)  - 2 x daily - 3 reps - 30 hold  - Standing Shoulder Abduction ROM with Dowel (Mirrored)  - 2 x daily - 2 sets - 10 reps  - Standing shoulder flexion wall slides  - 2 x daily - 2 sets - 10 reps - 5 hold  - Standing Sleeper Stretch at [facility] [place] (Mirrored)  - 2 x daily - 3 reps - 30 hold    SOC: 2023  FOTO: 2023  POC EXP: 10/4/2023  DAILY TREATMENT LOG:  date 2023     Visit # /auth  2 3                             Neuro Re-Ed   10'             B/L UE ext w/ tB  RTB 2x10 Blue tubing 2x10     B/L UE ER w/ TB   RTB 2x10     SL ER w/ scap set   1# 1x10                             Ther Ex 15' 40' 35'     cerv retr w/ self OP 5"x10 5"x10 5"x10     Wall slides b/L flexion 1x10 5" 2x10 5" 2x10     Stand cane abd 1x10 2x10 2x10     Stand exten stretch w/ cane   5"x10     Supine flexion w/ cane 5"x10 5" 2x10 1# 5" 2x10     ER w/ cane 90/90 supine 5"x10 10"x5 1# 5"x10     SL Abd AROM 1x10 2x10 1# 2x10     Sleeper stretch 30"x3 sidelying; showed sitting too 10"x5 30"x3     B/L Corner stretch  10"x5 10"x5     pulleys   3'     HEP Issued & reviewed       Ther Activity                        Gait Training                        Modalities

## 2023-07-26 ENCOUNTER — OFFICE VISIT (OUTPATIENT)
Dept: PHYSICAL THERAPY | Facility: CLINIC | Age: 63
End: 2023-07-26
Payer: COMMERCIAL

## 2023-07-26 DIAGNOSIS — M19.011 OSTEOARTHRITIS OF GLENOHUMERAL JOINT, RIGHT: Primary | ICD-10-CM

## 2023-07-26 PROCEDURE — 97112 NEUROMUSCULAR REEDUCATION: CPT

## 2023-07-26 PROCEDURE — 97110 THERAPEUTIC EXERCISES: CPT

## 2023-07-26 NOTE — PROGRESS NOTES
Daily Note     Today's date: 2023  Patient name: Ant Zaldivar  : 1960  MRN: [id number]  Referring provider: Dunia Villatoro*  Dx:   Encounter Diagnosis     ICD-10-CM    1. Osteoarthritis of glenohumeral joint, right  M19.011                      Subjective: pt reports that his pain has been less sharp than what it was, his ROM OH feels improved. He still struggles with the behind the back movements when he is putting his belt on. He has felt his ROM when turning to look in the car has been improved since working on his posture. Reports he has not been very compliant with his HEP. 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 Pt challenged with OH motions and additional postural strengthening, cuing for postural awareness with proper recruitment. Cont to progress R UE ROM and strengthening as tolerated per symptom irritability to increase functional mobility of R UE. Patient would benefit from continued PT      Plan: Continue per plan of care. Precautions:   Past Medical History:   Diagnosis Date   • Arthritis     knees   • Cancer ([address])     stg 1-prostate   • Colon polyp    • Extrinsic asthma    • Undescended right testis    Access Code: [email or web address]  URL: [email or web address]  Date: 2023  Prepared by:  Dyana Pino    Exercises  - Seated Passive Cervical Retraction  - 2 x daily - 15 reps - 5 hold  - Supine Shoulder Flexion Extension AAROM with Dowel  - 2 x daily - 10 reps - 5 hold  - Supine Shoulder External Rotation with Dowel  - 2 x daily - 10 reps - 5 hold  - Sleeper Stretch (Mirrored)  - 2 x daily - 3 reps - 30 hold  - Standing Shoulder Abduction ROM with Dowel (Mirrored)  - 2 x daily - 2 sets - 10 reps  - Standing shoulder flexion wall slides  - 2 x daily - 2 sets - 10 reps - 5 hold  - Standing Sleeper Stretch at [facility])  - 2 x daily - 3 reps - 30 hold    SOC: 7/12/2023  FOTO: 7/12/2023  POC EXP: 10/4/2023  DAILY TREATMENT LOG:  date 7/12/2023 7/17/2023 7/20/2023 7/26/2023    Visit # /auth  2 3 4                             Neuro Re-Ed   10' 25'             B/L UE ext w/ TB  RTB 2x10 Blue tubing 2x10 Blue tubing 2x10     High row     Red tubing 2x10     B/L UE ER w/ TB   RTB 2x10 RTB 2x10     B/L horz abd w/ TB     RTB 2x10     SL ER w/ scap set   1# 1x10 2# 2x10                             Ther Ex 15' 40' 35' 15'     cerv retr w/ self OP 5"x10 5"x10 5"x10     Wall slides B/L flexion 1x10 5" 2x10 5" 2x10 5"x15     Stand cane abd 1x10 2x10 2x10     Stand exten stretch w/ cane   5"x10     Supine flexion w/ cane 5"x10 5" 2x10 1# 5" 2x10 2# 10"x5     ER w/ cane 90/90 supine 5"x10 10"x5 1# 5"x10 2# 5"x10    SL Abd AROM 1x10 2x10 1# 2x10 2# 2x10     Sleeper stretch 30"x3 sidelying; showed sitting too 10"x5 30"x3     B/L Corner stretch  10"x5 10"x5 10"x5     pulleys   3' 3'     HEP Issued & reviewed       Ther Activity                        Gait Training                        Modalities

## 2023-08-01 ENCOUNTER — OFFICE VISIT (OUTPATIENT)
Dept: PHYSICAL THERAPY | Facility: CLINIC | Age: 63
End: 2023-08-01
Payer: COMMERCIAL

## 2023-08-01 DIAGNOSIS — M19.011 OSTEOARTHRITIS OF GLENOHUMERAL JOINT, RIGHT: Primary | ICD-10-CM

## 2023-08-01 PROCEDURE — 97110 THERAPEUTIC EXERCISES: CPT | Performed by: PHYSICAL THERAPIST

## 2023-08-01 PROCEDURE — 97112 NEUROMUSCULAR REEDUCATION: CPT | Performed by: PHYSICAL THERAPIST

## 2023-08-01 NOTE — PROGRESS NOTES
Daily Note     Today's date: 2023  Patient name: Ludy Martin  : 1960  MRN: [id number]  Referring provider: Vin Núñez  Dx:   Encounter Diagnosis     ICD-10-CM    1. Osteoarthritis of glenohumeral joint, right  M19.011                      Subjective: Pt reports he has less pain w/ UE activity overall, he still struggles w/ end ROM OH reaching and sustained OH activity. He reports occasional compliance w/ HEP. Objective: See treatment diary below  AROM:  stand  R  R  L     2023  Shoulder flex        132  120*  160  Shoulder abd       120*  88*  160  Functional IR  SI jt*  SI jt*  L1  ER @ 90 abd  55*  55*  80    Assessment: Tolerated treatment well and has been able to progress hold time for stretches w/ good tolerance. He demonstrates improved AROM flexion/abduction vs eval. Patient would benefit from continued PT      Plan: Continue per plan of care. Precautions:   Past Medical History:   Diagnosis Date   • Arthritis     knees   • Cancer ([address])     stg 1-prostate   • Colon polyp    • Extrinsic asthma    • Undescended right testis    Access Code: [email or web address]  URL: [email or web address]  Date: 2023  Prepared by:  Bay Padilla    Exercises  - Seated Passive Cervical Retraction  - 2 x daily - 15 reps - 5 hold  - Supine Shoulder Flexion Extension AAROM with Dowel  - 2 x daily - 10 reps - 5 hold  - Supine Shoulder External Rotation with Dowel  - 2 x daily - 10 reps - 5 hold  - Sleeper Stretch (Mirrored)  - 2 x daily - 3 reps - 30 hold  - Standing Shoulder Abduction ROM with Dowel (Mirrored)  - 2 x daily - 2 sets - 10 reps  - Standing shoulder flexion wall slides  - 2 x daily - 2 sets - 10 reps - 5 hold  - Standing Sleeper Stretch at [facility] (Mirrored)  - 2 x daily - 3 reps - 30 hold    SOC: 2023  FOTO: 2023 68/69  POC EXP: 10/4/2023  DAILY TREATMENT LOG:  date 2023  FOTO   Visit David LauMeg Felix  2 3 4  5   Manual                        Neuro Re-Ed   10' 25'  25'           B/L UE ext w/ TB  RTB 2x10 Blue tubing 2x10 Blue tubing 2x10  Blue tubing 2x12   High row     Red tubing 2x10     B/L UE ER w/ TB   RTB 2x10 RTB 2x10  RTB 2x12   B/L horz abd w/ TB     RTB 2x10  RTB 2x10   SL ER w/ scap set   1# 1x10 2# 2x10     Serratus roll ups     YTB @ wrists 2x10                   Ther Ex 15' 40' 35' 15'  20'   cerv retr w/ self OP 5"x10 5"x10 5"x10  10"x5   Wall slides B/L flexion 1x10 5" 2x10 5" 2x10 5"x15     Stand cane abd 1x10 2x10 2x10  1x10   Stand exten stretch w/ cane   5"x10     Supine flexion w/ cane 5"x10 5" 2x10 1# 5" 2x10 2# 10"x5  2# 10"x5   ER w/ cane 90/90 supine 5"x10 10"x5 1# 5"x10 2# 5"x10 2# 10"x5   L SL R UE Abd AROM 1x10 2x10 1# 2x10 2# 2x10  2# 10"x5   Sleeper stretch 30"x3 sidelying; showed sitting too 10"x5 30"x3  30"x3   B/L Corner stretch  10"x5 10"x5 10"x5  20"x3   pulleys   3' 3'  3'   HEP Issued & reviewed       Ther Activity                        Gait Training                        Modalities

## 2023-08-03 ENCOUNTER — OFFICE VISIT (OUTPATIENT)
Dept: PHYSICAL THERAPY | Facility: CLINIC | Age: 63
End: 2023-08-03
Payer: COMMERCIAL

## 2023-08-03 DIAGNOSIS — M19.011 OSTEOARTHRITIS OF GLENOHUMERAL JOINT, RIGHT: Primary | ICD-10-CM

## 2023-08-03 PROCEDURE — 97112 NEUROMUSCULAR REEDUCATION: CPT

## 2023-08-03 PROCEDURE — 97110 THERAPEUTIC EXERCISES: CPT

## 2023-08-03 NOTE — PROGRESS NOTES
Daily Note     Today's date: 8/3/2023  Patient name: Becky Nguyen  : 1960  MRN: [id number]  Referring provider: Lena Garcia*  Dx:   Encounter Diagnosis     ICD-10-CM    1. Osteoarthritis of glenohumeral joint, right  M19.011                      Subjective: pt reports that things are going okay, he feels his pain has been less. Behind the back ROM remains limited. Pt reports he has been going to the gym 3x/week and his weights have been going up without hurting his shoulder. Objective: See treatment diary below      Assessment: Tolerated treatment well. Pt tolerated additional repetitions and scapular strengthening without increases in shoulder pain. Cont to progress as tolerated w/ in available ROM. Pt limited most with functional IR and OH. Patient would benefit from continued PT      Plan: Continue per plan of care. Precautions:   Past Medical History:   Diagnosis Date   • Arthritis     knees   • Cancer ([address])     stg 1-prostate   • Colon polyp    • Extrinsic asthma    • Undescended right testis    Access Code: [email or web address]  URL: [email or web address]  Date: 2023  Prepared by:  Lesley Carrion    Exercises  - Seated Passive Cervical Retraction  - 2 x daily - 15 reps - 5 hold  - Supine Shoulder Flexion Extension AAROM with Dowel  - 2 x daily - 10 reps - 5 hold  - Supine Shoulder External Rotation with Dowel  - 2 x daily - 10 reps - 5 hold  - Sleeper Stretch (Mirrored)  - 2 x daily - 3 reps - 30 hold  - Standing Shoulder Abduction ROM with Dowel (Mirrored)  - 2 x daily - 2 sets - 10 reps  - Standing shoulder flexion wall slides  - 2 x daily - 2 sets - 10 reps - 5 hold  - Standing Sleeper Stretch at [facility] (Mirrored)  - 2 x daily - 3 reps - 30 hold    SOC: 2023  FOTO: 2023 68/69  POC EXP: 10/4/2023  DAILY TREATMENT LOG:  date 8/3/2023       Visit # /auth 6        Manual                        Neuro Re-Ed 25'                B/L UE ext w/ TB kirstie 10# 2x10 High row  kirstie 10# 2x10       B/L UE ER w/ TB RTB 2x15       B/L horz abd w/ TB  RTB 2x15        SL ER w/ scap set 2#        Serratus roll ups YTB @ wrists 2x10       Wall clocks  YTB @ wrists 2x5 R/L                Ther Ex 15'       UBE  L4x4' alt fwd/bwd every min        cerv retr w/ self OP        Wall slides B/L flexion 5"x15        Stand cane abd 1x15        Stand exten stretch w/ cane 1x15        Uni cane flex standing  5"x15        Supine flexion w/ cane 2#        ER w/ cane 90/90 supine 2#        L SL R UE Abd AROM 2#       Sleeper stretch        Towel IR  10"x5        B/L Corner stretch 20"x3        pulleys 3'        HEP        Ther Activity                        Gait Training                        Modalities

## 2023-08-07 ENCOUNTER — OFFICE VISIT (OUTPATIENT)
Dept: PHYSICAL THERAPY | Facility: CLINIC | Age: 63
End: 2023-08-07
Payer: COMMERCIAL

## 2023-08-07 DIAGNOSIS — M19.011 OSTEOARTHRITIS OF GLENOHUMERAL JOINT, RIGHT: Primary | ICD-10-CM

## 2023-08-07 PROCEDURE — 97112 NEUROMUSCULAR REEDUCATION: CPT | Performed by: PHYSICAL THERAPIST

## 2023-08-07 PROCEDURE — 97110 THERAPEUTIC EXERCISES: CPT | Performed by: PHYSICAL THERAPIST

## 2023-08-07 NOTE — PROGRESS NOTES
Daily Note     Today's date: 2023  Patient name: Raghavendra Beverly  : 1960  MRN: [id number]  Referring provider: Zeeshan Mcgraw  Dx:   Encounter Diagnosis     ICD-10-CM    1. Osteoarthritis of glenohumeral joint, right  M19.011                      Subjective: Pt reports progress. He asks about adding TB exercises to HEP. Objective: See treatment diary below; discussed w/ pt his primary deficit is ROM, and he already is not doing HEP as often as is recommended for optimal outcomes. I fear adding resistance exercises will only reduce the attention/time spent on stretching at home. It would be more ideal for pt to stretch daily to improve shoulder ROM. Pt expressed understanding. Assessment: Tolerated treatment well. Patient would benefit from continued PT      Plan: Continue per plan of care. Precautions:   Past Medical History:   Diagnosis Date   • Arthritis     knees   • Cancer ([address])     stg 1-prostate   • Colon polyp    • Extrinsic asthma    • Undescended right testis    Access Code: [email or web address]  URL: [email or web address]  Date: 2023  Prepared by:  Jesús Melvin    Exercises  - Seated Passive Cervical Retraction  - 2 x daily - 15 reps - 5 hold  - Supine Shoulder Flexion Extension AAROM with Dowel  - 2 x daily - 10 reps - 5 hold  - Supine Shoulder External Rotation with Dowel  - 2 x daily - 10 reps - 5 hold  - Sleeper Stretch (Mirrored)  - 2 x daily - 3 reps - 30 hold  - Standing Shoulder Abduction ROM with Dowel (Mirrored)  - 2 x daily - 2 sets - 10 reps  - Standing shoulder flexion wall slides  - 2 x daily - 2 sets - 10 reps - 5 hold  - Standing Sleeper Stretch at [facility] [place] (Mirrored)  - 2 x daily - 3 reps - 30 hold    SOC: 2023  FOTO: 2023 68/69  POC EXP: 10/4/2023  DAILY TREATMENT LOG:  date 8/3/2023 2023      Visit # /auth 6  7      Manual                        Neuro Re-Ed 25'  15'              B/L UE ext w/ TB kirstie 10# 2x10       High row kirstie 10# 2x10       B/L UE ER w/ TB RTB 2x15 grn TB 2x10      B/L horz abd w/ TB  RTB 2x15  RTB 2x15      SL ER w/ scap set 2#        Serratus roll ups YTB @ wrists 2x10 YTB @ wrists 2x10      Wall clocks  YTB @ wrists 2x5 R/L                Ther Ex 15' 30'      UBE  L4x4' alt fwd/bwd every min  L4x4' alt fwd/bkwd every min      cerv retr w/ self OP  5"x10      Wall slides B/L flexion 5"x15  5"x15      Stand cane abd 1x15  1x15 VCs for technique      Stand exten stretch w/ cane 1x15  5"x15      Uni cane flex standing  5"x15  1x15      Supine flexion w/ cane 2#        ER w/ cane 90/90 supine 2#  10"x5 2#      L SL R UE Abd AROM 2# 2# 5"x15      Sleeper stretch  20"x3      Towel IR  10"x5        B/L Corner stretch 20"x3  20"x3      pulleys 3'        HEP        Ther Activity                        Gait Training                        Modalities

## 2023-08-09 ENCOUNTER — APPOINTMENT (OUTPATIENT)
Dept: PHYSICAL THERAPY | Facility: CLINIC | Age: 63
End: 2023-08-09
Payer: COMMERCIAL

## 2023-08-14 ENCOUNTER — OFFICE VISIT (OUTPATIENT)
Dept: PHYSICAL THERAPY | Facility: CLINIC | Age: 63
End: 2023-08-14
Payer: COMMERCIAL

## 2023-08-14 DIAGNOSIS — M19.011 OSTEOARTHRITIS OF GLENOHUMERAL JOINT, RIGHT: Primary | ICD-10-CM

## 2023-08-14 PROCEDURE — 97110 THERAPEUTIC EXERCISES: CPT

## 2023-08-14 PROCEDURE — 97112 NEUROMUSCULAR REEDUCATION: CPT

## 2023-08-14 NOTE — PROGRESS NOTES
Daily Note     Today's date: 2023  Patient name: Nahid Mehta  : 1960  MRN: [id number]  Referring provider: Creed Frankel  Dx:   Encounter Diagnosis     ICD-10-CM    1. Osteoarthritis of glenohumeral joint, right  M19.011                      Subjective: Pt has no new complaints on arrival. He helped his son take apart and put together a swing set yesterday, overall tolerated this well. Objective: See treatment diary below      Assessment: Tolerated treatment well. VC/TC for scapular contractions during additional prone strengthening. No noted increases in pain with progressions. Cont to progress R UE mobility as tolerated. Patient would benefit from continued PT      Plan: Continue per plan of care. Precautions:   Past Medical History:   Diagnosis Date   • Arthritis     knees   • Cancer ([address])     stg 1-prostate   • Colon polyp    • Extrinsic asthma    • Undescended right testis    Access Code: [email or web address]  URL: [email or web address]  Date: 2023  Prepared by:  Parker Carlisle    Exercises  - Seated Passive Cervical Retraction  - 2 x daily - 15 reps - 5 hold  - Supine Shoulder Flexion Extension AAROM with Dowel  - 2 x daily - 10 reps - 5 hold  - Supine Shoulder External Rotation with Dowel  - 2 x daily - 10 reps - 5 hold  - Sleeper Stretch (Mirrored)  - 2 x daily - 3 reps - 30 hold  - Standing Shoulder Abduction ROM with Dowel (Mirrored)  - 2 x daily - 2 sets - 10 reps  - Standing shoulder flexion wall slides  - 2 x daily - 2 sets - 10 reps - 5 hold  - Standing Sleeper Stretch at [facility] [place] (Mirrored)  - 2 x daily - 3 reps - 30 hold    SOC: 2023  FOTO: 2023 68/69  POC EXP: 10/4/2023  DAILY TREATMENT LOG:  date 8/3/2023 2023 2023     Visit # /auth 6  7 8      Manual                        Neuro Re-Ed 25'  15' 23'             B/L UE ext w/ TB kirstie 10# 2x10  kirstie 10# 2x10     High row  kirstie 10# 2x10  kirstie 10# 2x10      [employer] push ups 2x10      B/L UE ER w/ TB RTB 2x15 grn TB 2x10 GTB 2x10     B/L horz abd w/ TB  RTB 2x15  RTB 2x15 GTB 2x10      SL ER w/ scap set 2#        Serratus roll ups YTB @ wrists 2x10 YTB @ wrists 2x10      Wall clocks  YTB @ wrists 2x5 R/L   YTB @ wrists 2x5 R/L       Prone I   3"x10     Prone T    3"x10      RS ball on wall in 90 flex/abd    20x cw/ccw      Ther Ex 15' 30' 15'     UBE  L4x4' alt fwd/bwd every min  L4x4' alt fwd/bkwd every min      cerv retr w/ self OP  5"x10      Wall slides B/L flexion 5"x15  5"x15 5"x15      Stand cane abd 1x15  1x15 VCs for technique 5"x15      Stand exten stretch w/ cane 1x15  5"x15 5"x15     Uni cane flex standing  5"x15  1x15      Supine flexion w/ cane 2#   2# on cane 10"x5      ER w/ cane 90/90 supine 2#  10"x5 2#      L SL R UE Abd AROM 2# 2# 5"x15      Sleeper stretch  20"x3 20"x3      Towel IR  10"x5        B/L Corner stretch 20"x3  20"x3 20"x3      pulleys 3'        HEP        Ther Activity                        Gait Training                        Modalities

## 2023-08-15 ENCOUNTER — OFFICE VISIT (OUTPATIENT)
Dept: FAMILY MEDICINE CLINIC | Facility: CLINIC | Age: 63
End: 2023-08-15
Payer: COMMERCIAL

## 2023-08-15 VITALS
SYSTOLIC BLOOD PRESSURE: 132 MMHG | RESPIRATION RATE: 16 BRPM | HEART RATE: 52 BPM | HEIGHT: 69 IN | WEIGHT: 197.38 LBS | BODY MASS INDEX: 29.23 KG/M2 | DIASTOLIC BLOOD PRESSURE: 72 MMHG | TEMPERATURE: 97.4 F

## 2023-08-15 DIAGNOSIS — Z13.6 SCREENING FOR CARDIOVASCULAR CONDITION: ICD-10-CM

## 2023-08-15 DIAGNOSIS — Z00.00 ROUTINE ADULT HEALTH MAINTENANCE: ICD-10-CM

## 2023-08-15 DIAGNOSIS — Z13.29 SCREENING FOR THYROID DISORDER: ICD-10-CM

## 2023-08-15 DIAGNOSIS — C61 PROSTATE CANCER (HCC): Primary | ICD-10-CM

## 2023-08-15 DIAGNOSIS — Z13.1 SCREENING FOR DIABETES MELLITUS: ICD-10-CM

## 2023-08-15 PROBLEM — H91.93 HEARING DECREASED, BILATERAL: Status: ACTIVE | Noted: 2023-08-15

## 2023-08-15 PROCEDURE — 99396 PREV VISIT EST AGE 40-64: CPT | Performed by: NURSE PRACTITIONER

## 2023-08-15 NOTE — PROGRESS NOTES
[address]    NAME: Bill Alejandro  AGE: 60 y.o. SEX: male  : 1960     DATE: 8/15/2023     Assessment and Plan:     Problem List Items Addressed This Visit        Genitourinary    Prostate cancer ([address]) - Primary     Follows with [facility], surveillance for now           Relevant Orders    PSA Total, Diagnostic   Other Visit Diagnoses     Routine adult health maintenance        Screening for cardiovascular condition        Relevant Orders    CBC and differential    Comprehensive metabolic panel    Lipid panel    Screening for thyroid disorder        Relevant Orders    TSH, 3rd generation with Free T4 reflex    Screening for diabetes mellitus        Relevant Orders    Hemoglobin A1C          Immunizations and preventive care screenings were discussed with patient today. Appropriate education was printed on patient's after visit summary. Counseling:  Dental Health: discussed importance of regular tooth brushing, flossing, and dental visits. Sexual health: discussed sexually transmitted diseases, partner selection, use of condoms, avoidance of unintended pregnancy, and contraceptive alternatives. Exercise: the importance of regular exercise/physical activity was discussed. Recommend exercise 3-5 times per week for at least 30 minutes. BMI Counseling: Body mass index is 29.15 kg/m². The BMI is above normal. Nutrition recommendations include consuming healthier snacks. Exercise recommendations include moderate physical activity 150 minutes/week. Rationale for BMI follow-up plan is due to patient being overweight or obese. Return for Annual physical.     Chief Complaint:     Chief Complaint   Patient presents with   • Annual Exam     Lw cma      History of Present Illness:     Adult Annual Physical   Patient here for a comprehensive physical exam. The patient reports no problems.   Diagnosed with prostate cancer, follows with [facility], has f/u in September    Diet and Physical Activity  Diet/Nutrition: well balanced diet. Exercise: moderate cardiovascular exercise. Depression Screening  PHQ-2/9 Depression Screening         General Health  Sleep: sleeps well. Hearing: decreased - bilateral.  Vision: most recent eye exam >1 year ago and previous LASIK surgery. Dental: regular dental visits and brushes teeth twice daily.  Health  Symptoms include: none     Review of Systems:     Review of Systems   Constitutional: Negative. Respiratory: Negative. Cardiovascular: Negative. Gastrointestinal: Negative. Neurological: Negative.        Past Medical History:     Past Medical History:   Diagnosis Date   • Arthritis     knees   • Cancer (HCC)     stg 1-prostate   • Colon polyp    • Extrinsic asthma    • Undescended right testis       Past Surgical History:     Past Surgical History:   Procedure Laterality Date   • BILATERAL HIP ARTHROSCOPY      resurfaced   • COLONOSCOPY     • HERNIA REPAIR Bilateral     age 4yr   • KNEE ARTHROSCOPY Bilateral     meniscus repair   • LASIK     • ORCHIECTOMY     • TONSILLECTOMY     • WISDOM TOOTH EXTRACTION        Family History:     Family History   Problem Relation Age of Onset   • Bone cancer Father    • Prostate cancer Father    • Cancer Father         prostate w/mets   • COPD Mother         emphysema-smoker   • Hypertension Mother    • Mental illness Neg Hx       Social History:     Social History     Socioeconomic History   • Marital status: Single     Spouse name: None   • Number of children: None   • Years of education: None   • Highest education level: None   Occupational History   • None   Tobacco Use   • Smoking status: Former     Packs/day: 1.00     Years: 10.00     Total pack years: 10.00     Types: Cigarettes     Start date: 1971     Quit date: 1982     Years since quittin.6   • Smokeless tobacco: Never   • Tobacco comments:     quit 35 yrs ago   Vaping Use   • Vaping Use: Never used   Substance and Sexual Activity   • Alcohol use: Yes     Alcohol/week: 2.0 standard drinks of alcohol     Types: 2 Glasses of wine per week     Comment: social   • Drug use: Never   • Sexual activity: None   Other Topics Concern   • None   Social History Narrative   • None     Social Determinants of Health     Financial Resource Strain: Not on file   Food Insecurity: Not on file   Transportation Needs: Not on file   Physical Activity: Not on file   Stress: Not on file   Social Connections: Not on file   Intimate Partner Violence: Not on file   Housing Stability: Not on file      Current Medications:     Current Outpatient Medications   Medication Sig Dispense Refill   • albuterol (PROVENTIL HFA,VENTOLIN HFA) 90 mcg/act inhaler INHALE 2 PUFFS EVERY 6 HOURS AS NEEDED FOR WHEEZING 18 g 0     No current facility-administered medications for this visit. Allergies:     No Known Allergies   Physical Exam:     /72   Pulse (!) 52   Temp (!) 97.4 °F (36.3 °C) (Tympanic)   Resp 16   Ht 5' 9" (1.753 m)   Wt 89.5 kg (197 lb 6 oz)   BMI 29.15 kg/m²     Physical Exam  Vitals and nursing note reviewed. Constitutional:       General: He is not in acute distress. Appearance: Normal appearance. He is well-developed. HENT:      Head: Normocephalic and atraumatic. Right Ear: Tympanic membrane normal.      Left Ear: Tympanic membrane normal.      Nose: Nose normal.   Eyes:      Extraocular Movements: Extraocular movements intact. Conjunctiva/sclera: Conjunctivae normal.      Pupils: Pupils are equal, round, and reactive to light. Neck:      Vascular: No carotid bruit. Cardiovascular:      Rate and Rhythm: Normal rate and regular rhythm. Pulses: Normal pulses. Heart sounds: No murmur heard. Pulmonary:      Effort: Pulmonary effort is normal. No respiratory distress. Breath sounds: Normal breath sounds. Abdominal:      Palpations: Abdomen is soft. Tenderness: There is no abdominal tenderness. Musculoskeletal:         General: No swelling. Cervical back: Neck supple. Skin:     General: Skin is warm and dry. Capillary Refill: Capillary refill takes less than 2 seconds. Neurological:      Mental Status: He is alert. Sensory: No sensory deficit.       Coordination: Coordination normal.      Deep Tendon Reflexes: Reflexes normal.   Psychiatric:         Mood and Affect: Mood normal.         Behavior: Behavior normal.          Amanda Ricardo, [address]

## 2023-08-16 ENCOUNTER — EVALUATION (OUTPATIENT)
Dept: PHYSICAL THERAPY | Facility: CLINIC | Age: 63
End: 2023-08-16
Payer: COMMERCIAL

## 2023-08-16 DIAGNOSIS — M19.011 OSTEOARTHRITIS OF GLENOHUMERAL JOINT, RIGHT: Primary | ICD-10-CM

## 2023-08-16 PROCEDURE — 97110 THERAPEUTIC EXERCISES: CPT | Performed by: PHYSICAL THERAPIST

## 2023-08-16 PROCEDURE — 97140 MANUAL THERAPY 1/> REGIONS: CPT | Performed by: PHYSICAL THERAPIST

## 2023-08-16 NOTE — PROGRESS NOTES
PT Evaluation     Today's date: 2023  Patient name: Tika Garcia  : 1960  MRN: [id number]  Referring provider: Shira Obrien  Dx:   Encounter Diagnosis     ICD-10-CM    1. Osteoarthritis of glenohumeral joint, right  M19.011                      Assessment  Assessment details: 2023: Pt has attended 9 skilled PT sessions and reports decreased frequency of severe pain, improved function and that he feels ready to D/C to HEP. He demonstrates improving A/PROM and strength. He understands w/his amount of OA, his shoulder ROM will not likely ever be full, but should continue to improve further w/ continued HEP coompliance. He expressed understanding of importance of HEP.     2023: Tika Garcia is a 60 y.o. male who presents with pain, decreased strength, decreased ROM, decreased joint mobility, postural dysfunction and imaging indicating advanced OA R shoulder. Due to these impairments, patient has difficulty performing ADL's, recreational activities, work-related activities, lifting/carrying, reaching. Patient's clinical presentation is consistent with their referring diagnosis of Osteoarthritis of glenohumeral joint, right. Cervical mechanical assessment indicative of some postural contribution to limited shoulder ROM (reduction of forward head improves flexion AROM), but cervical derangement is not suspected. He merely has additional cervical spine dysfunction and fixed forward head posture. Plan: Ambulatory Referral to Physical Therapy  Patient has been educated in home exercise program and plan of care.  Patient would benefit from skilled physical therapy services to address their aforementioned functional limitations and progress towards prior level of function and independence with home exercise program.     Impairments: abnormal or restricted ROM, activity intolerance, impaired physical strength and pain with function    Goals  Short Term Goals to be met in 4 weeks (8/9/2023) - met all STG's  1. Pt to be independent w/ prelimary HEP  2. PROM R shoulder to improve by 20 degrees in all planes to prepare for improved AROM/functional mobility. 3. Improve cervical AROM to min holm w/o pain for rotation and retraction; mod holm or better to reduce forward head posture add assist w/ shoulder AROM. 4. Improve AROM flexion and abduction to 135 or better to improve subjective function by at least 25%. Long Term Goals to be met in 12 weeks (10/4/2023)  1. AROM R shoulder to be at least 150 flexion; 120 abd; 75 ER and to L5 IR to allow ease w/ ADL's and IADL's - improved/ not met  2. Improve strength to 4+/5 or better to allow lifting, reaching and carrying w/o c/o.- met  3. Improved FOTO score to 69 or better. - met  4. 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 -met      Plan  Plan details:       Planned therapy interventions: home exercise program  Frequency: 2x week (2-3x/week)  Plan of Care beginning date: 7/12/2023  Plan of Care expiration date: 10/4/2023  Treatment plan discussed with: patient        Subjective Evaluation    History of Present Illness  Mechanism of injury: Subjective   8/16/2023: Pt reports intense pain occurs much less often, now only w/ heavy OH lifting and horiz abd, whereas all AROM was very painful when he started PT. He notes improved functional ROM now. 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 7/12/2023: Pt reports 20 years of R shoulder pain and limited functional ROM overhead and behind his body, even just reaching overhead without weight. He had a steroid injection yesterday. Previous steroid injections have helped. OA is severe and pt understands TSA is in his future. He also has B/L knee OA, but has had prior mensicus surgery B/L and hip OA with surgery for resurfacing.  He gets "gel" injections in his knees every 6 months. Patient Goals  Patient goals for therapy: decreased pain, increased strength, independence with ADLs/IADLs, return to sport/leisure activities and increased motion    Pain  At best pain ratin  At worst pain ratin  Quality: stiff (except sharp w/ horiz abd)  Relieving factors: change in position  Aggravating factors: overhead activity (trying to reach horiz abd)  Progression: improved    Social Support    Employment status: working (works nights for 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 Diagnostic Tests  Ultrasound findings: abnormalTreatments  Previous treatment: injection treatment  Current treatment: physical therapy        Objective        Posture:   2023: moderate forward head posture; hard end feel w/ attempt to correct due to postural dysfunction    PROM: supine  R  R  L     2023  Shoulder flex        160  145*  170  Shoulder abd       125  95*  170  ER @90 abd  70  55*  80  IR @90 abd  30  15*  50    AROM:  stand  R  R  L     2023  Shoulder flex        135  120*  160  Shoulder abd       105  88*  160  Functional IR  L5  SI jt*  L1  ER @ 90 abd  70  55*  80      Cervical AROM loss:    2023  Flexion: Min  MIN     Extension:   Mod  PHILIPPE    R rotation:  Min 20% MOD 50%    L rotation:  Min/mod 30% MOD 40%    R Sidebend:  philippe  PHILIPPE 75%    L Sidebend: philippe  PHILIPPE 75%    Retraction:  Min 25% MOD 50%       MMT:    R   R   L     2023  Shoulder flex  5/5 in avail ROM  4+/5in avail ROM  5/5  Shoulder abd (C5) 4+/5 in avail ROM 4+/5in avail ROM  5/5  Internal Rotation 5/5   4+/5   5/5  External Rotation 4+/5 in avail ROM 4/5in avail ROM  5/5  Elbow flex (bicep C6)  5/5   5/5   5/5  Elbow ext (tricep C7) 5/5   5/5   5/5    MECHANICAL ASSESSMENT:   2023  Rep c/s retraction w/ OP 5" x10 =  Increases AROM shld flexion; NE pain  2023  Rep c/s retraction w/ OP 5" x10 =  Increases AROM shld flexion; NE pain      DERMATOMAL TESTIN2023 - WNL C2-T1 B/L Tenderness/Palpation:    JOINT MOBILITY:  2023: remains mod holm per eval  2023: [id number] joint mobility mod holm inferior/posterior glide    FUNCTION:  2023: improved functional ER/IR mild end ROM loss/stiffness; Habd remains painful. Improved ability to reach/lift OH; heavy OH lifting remains painful. Sleep is improved. 2023: Pt is limited with AROM all planes abd/IR> flex/ER - pain w/ OH reach, cannot reach behind plane of body - limits dressing, reaching, job (referee/umpire; works on boilers); wakes if he rolls onto R side at night. Precautions:   Past Medical History:   Diagnosis Date   • Arthritis     knees   • Cancer ([address])     stg 1-prostate   • Colon polyp    • Extrinsic asthma    • Undescended right testis    Access Code: [email or web address]  URL: [email or web address]  Date: 2023  Prepared by:  Sowmya Sapp    Exercises  - Seated Passive Cervical Retraction  - 2 x daily - 15 reps - 5 hold  - Supine Shoulder Flexion Extension AAROM with Dowel  - 2 x daily - 10 reps - 5 hold  - Supine Shoulder External Rotation with Dowel  - 2 x daily - 10 reps - 5 hold  - Sleeper Stretch (Mirrored)  - 2 x daily - 3 reps - 30 hold  - Standing Shoulder Abduction ROM with Dowel (Mirrored)  - 2 x daily - 2 sets - 10 reps  - Standing shoulder flexion wall slides  - 2 x daily - 2 sets - 10 reps - 5 hold  - Standing Sleeper Stretch at [place] (Mirrored)  - 2 x daily - 3 reps - 30 hold    SOC: 2023  FOTO: 2023 68/69  POC EXP: 10/4/2023  DAILY TREATMENT LOG:  date 8/3/2023 2023 2023 2023  FOTO/RE    Visit # /auth 6  7 8  9    Manual    10'    ROM/MMTT    TT            Neuro Re-Ed 25'  15' 23' 5'            B/L UE ext w/ TB kirstie 10# 2x10  kirstie 10# 2x10     High row  kirstie 10# 2x10  kirstie 10# 2x10      [place] push ups    2x10      B/L UE ER w/ TB RTB 2x15 grn TB 2x10 GTB 2x10 GRN 2X10    B/L horz abd w/ TB  RTB 2x15  RTB 2x15 GTB 2x10      SL ER w/ scap set 2#        Serratus roll ups YTB @ wrists 2x10 YTB @ wrists 2x10      Wall clocks  YTB @ wrists 2x5 R/L   YTB @ wrists 2x5 R/L       Prone I   3"x10     Prone T    3"x10      RS ball on wall in 90 flex/abd    20x cw/ccw      Ther Ex 15' 30' 15' 25'    UBE  L4x4' alt fwd/bwd every min  L4x4' alt fwd/bkwd every min  L4x4' fwd/bkwd every min    cerv retr w/ self OP  5"x10  5"x10    Wall slides B/L flexion 5"x15  5"x15 5"x15  5"x15    Stand cane abd 1x15  1x15 VCs for technique 5"x15  5"x15    Stand exten stretch w/ cane 1x15  5"x15 5"x15 5"X15    Uni cane flex standing  5"x15  1x15  1x10    Supine flexion w/ cane 2#   2# on cane 10"x5  2# on cane; 10"x5    ER w/ cane 90/90 supine 2#  10"x5 2#      L SL R UE Abd AROM 2# 2# 5"x15  3# 10"x5    Sleeper stretch  20"x3 20"x3  20"x3    Towel IR  10"x5        B/L Corner stretch 20"x3  20"x3 20"x3  20"x3    pulleys 3'        HEP        Ther Activity                        Gait Training                        Modalities

## 2023-08-21 ENCOUNTER — APPOINTMENT (OUTPATIENT)
Dept: PHYSICAL THERAPY | Facility: CLINIC | Age: 63
End: 2023-08-21
Payer: COMMERCIAL

## 2023-08-23 ENCOUNTER — APPOINTMENT (OUTPATIENT)
Dept: PHYSICAL THERAPY | Facility: CLINIC | Age: 63
End: 2023-08-23
Payer: COMMERCIAL

## 2023-08-25 LAB
ALBUMIN SERPL-MCNC: 4.3 G/DL (ref 3.9–4.9)
ALBUMIN/GLOB SERPL: 2 {RATIO} (ref 1.2–2.2)
ALP SERPL-CCNC: 76 IU/L (ref 44–121)
ALT SERPL-CCNC: 15 IU/L (ref 0–44)
AST SERPL-CCNC: 19 IU/L (ref 0–40)
BASOPHILS # BLD AUTO: 0.1 X10E3/UL (ref 0–0.2)
BASOPHILS NFR BLD AUTO: 1 %
BILIRUB SERPL-MCNC: 0.5 MG/DL (ref 0–1.2)
BUN SERPL-MCNC: 16 MG/DL (ref 8–27)
BUN/CREAT SERPL: 15 (ref 10–24)
CALCIUM SERPL-MCNC: 10 MG/DL (ref 8.6–10.2)
CHLORIDE SERPL-SCNC: 100 MMOL/L (ref 96–106)
CHOLEST SERPL-MCNC: 231 MG/DL (ref 100–199)
CO2 SERPL-SCNC: 23 MMOL/L (ref 20–29)
CREAT SERPL-MCNC: 1.09 MG/DL (ref 0.76–1.27)
EGFR: 76 ML/MIN/1.73
EOSINOPHIL # BLD AUTO: 0.1 X10E3/UL (ref 0–0.4)
EOSINOPHIL NFR BLD AUTO: 2 %
ERYTHROCYTE [DISTWIDTH] IN BLOOD BY AUTOMATED COUNT: 13.6 % (ref 11.6–15.4)
GLOBULIN SER-MCNC: 2.2 G/DL (ref 1.5–4.5)
GLUCOSE SERPL-MCNC: 91 MG/DL (ref 70–99)
HBA1C MFR BLD: 5.7 % (ref 4.8–5.6)
HCT VFR BLD AUTO: 47.9 % (ref 37.5–51)
HDLC SERPL-MCNC: 50 MG/DL
HGB BLD-MCNC: 16 G/DL (ref 13–17.7)
IMM GRANULOCYTES # BLD: 0 X10E3/UL (ref 0–0.1)
IMM GRANULOCYTES NFR BLD: 1 %
LDLC SERPL CALC-MCNC: 161 MG/DL (ref 0–99)
LYMPHOCYTES # BLD AUTO: 2.3 X10E3/UL (ref 0.7–3.1)
LYMPHOCYTES NFR BLD AUTO: 31 %
MCH RBC QN AUTO: 28.7 PG (ref 26.6–33)
MCHC RBC AUTO-ENTMCNC: 33.4 G/DL (ref 31.5–35.7)
MCV RBC AUTO: 86 FL (ref 79–97)
MICRODELETION SYND BLD/T FISH: NORMAL
MONOCYTES # BLD AUTO: 0.8 X10E3/UL (ref 0.1–0.9)
MONOCYTES NFR BLD AUTO: 10 %
NEUTROPHILS # BLD AUTO: 4.2 X10E3/UL (ref 1.4–7)
NEUTROPHILS NFR BLD AUTO: 55 %
PLATELET # BLD AUTO: 252 X10E3/UL (ref 150–450)
POTASSIUM SERPL-SCNC: 4.1 MMOL/L (ref 3.5–5.2)
PROT SERPL-MCNC: 6.5 G/DL (ref 6–8.5)
PSA SERPL-MCNC: 6.2 NG/ML (ref 0–4)
RBC # BLD AUTO: 5.58 X10E6/UL (ref 4.14–5.8)
SL AMB VLDL CHOLESTEROL CALC: 20 MG/DL (ref 5–40)
SODIUM SERPL-SCNC: 140 MMOL/L (ref 134–144)
TRIGL SERPL-MCNC: 114 MG/DL (ref 0–149)
TSH SERPL DL<=0.005 MIU/L-ACNC: 1.22 UIU/ML (ref 0.45–4.5)
WBC # BLD AUTO: 7.5 X10E3/UL (ref 3.4–10.8)

## 2023-08-28 ENCOUNTER — APPOINTMENT (OUTPATIENT)
Dept: PHYSICAL THERAPY | Facility: CLINIC | Age: 63
End: 2023-08-28
Payer: COMMERCIAL

## 2023-08-30 ENCOUNTER — APPOINTMENT (OUTPATIENT)
Dept: PHYSICAL THERAPY | Facility: CLINIC | Age: 63
End: 2023-08-30
Payer: COMMERCIAL

## 2023-10-26 ENCOUNTER — OFFICE VISIT (OUTPATIENT)
Age: 63
End: 2023-10-26
Payer: COMMERCIAL

## 2023-10-26 ENCOUNTER — APPOINTMENT (OUTPATIENT)
Dept: RADIOLOGY | Facility: CLINIC | Age: 63
End: 2023-10-26
Payer: COMMERCIAL

## 2023-10-26 VITALS
SYSTOLIC BLOOD PRESSURE: 125 MMHG | BODY MASS INDEX: 29.18 KG/M2 | HEART RATE: 52 BPM | DIASTOLIC BLOOD PRESSURE: 73 MMHG | HEIGHT: 69 IN | WEIGHT: 197 LBS

## 2023-10-26 DIAGNOSIS — S99.911A INJURY OF RIGHT ANKLE, INITIAL ENCOUNTER: Primary | ICD-10-CM

## 2023-10-26 DIAGNOSIS — Q66.71 PES CAVUS OF RIGHT FOOT: ICD-10-CM

## 2023-10-26 DIAGNOSIS — M76.71 PERONEAL TENDONITIS OF RIGHT LOWER EXTREMITY: ICD-10-CM

## 2023-10-26 DIAGNOSIS — S99.911A INJURY OF RIGHT ANKLE, INITIAL ENCOUNTER: ICD-10-CM

## 2023-10-26 PROCEDURE — 73610 X-RAY EXAM OF ANKLE: CPT

## 2023-10-26 PROCEDURE — 99204 OFFICE O/P NEW MOD 45 MIN: CPT | Performed by: STUDENT IN AN ORGANIZED HEALTH CARE EDUCATION/TRAINING PROGRAM

## 2023-10-26 RX ORDER — MELOXICAM 15 MG/1
15 TABLET ORAL DAILY
Qty: 30 TABLET | Refills: 0 | Status: SHIPPED | OUTPATIENT
Start: 2023-10-26

## 2023-10-26 RX ORDER — CYCLOBENZAPRINE HCL 10 MG
TABLET ORAL
COMMUNITY

## 2023-10-26 RX ORDER — TRIAMCINOLONE ACETONIDE 1 MG/G
CREAM TOPICAL
COMMUNITY

## 2023-10-26 RX ORDER — DOXYCYCLINE 40 MG/1
1 CAPSULE ORAL DAILY
COMMUNITY

## 2023-10-26 RX ORDER — CEFIXIME 400 MG/1
CAPSULE ORAL
COMMUNITY
Start: 2023-09-05

## 2023-10-26 RX ORDER — PREDNISONE 10 MG/1
TABLET ORAL
COMMUNITY

## 2023-10-26 RX ORDER — DOXYCYCLINE HYCLATE 100 MG/1
CAPSULE ORAL
COMMUNITY

## 2023-10-26 RX ORDER — IVERMECTIN 10 MG/G
CREAM TOPICAL
COMMUNITY

## 2023-10-26 NOTE — PROGRESS NOTES
This patient was seen on 10/26/23. My role is Foot , Ankle, and Wound Specialist    ASSESSMENT     Diagnoses and all orders for this visit:    Injury of right ankle, initial encounter  -     X-ray ankle right 3+ views; Future  -     Ankle Cude ankle/Ankle Brace    Peroneal tendonitis of right lower extremity  -     meloxicam (Mobic) 15 mg tablet; Take 1 tablet (15 mg total) by mouth daily    Pes cavus of right foot    Other orders  -     cefixime (SUPRAX) 400 mg; TAKE 1 CAPSULE 2 HOURS PRIOR TO SCHEDULED PROCEDURE. (Patient not taking: Reported on 10/26/2023)  -     cyclobenzaprine (FLEXERIL) 10 mg tablet; TK 1 T PO BID PRF BACK PAIN (Patient not taking: Reported on 10/26/2023)  -     doxycycline (Oracea) 40 MG capsule; Take 1 capsule by mouth daily (Patient not taking: Reported on 10/26/2023)  -     Ivermectin (Soolantra) 1 % CREA; APPLY THIN LAYER TO FACE AT BEDTIME  -     doxycycline hyclate (VIBRAMYCIN) 100 mg capsule; Take 1 capsule twice a day by oral route as directed for 10 days. (Patient not taking: Reported on 10/26/2023)  -     ketotifen (ZADITOR) 0.025 % ophthalmic solution; ADMINISTER 1 DROP TO THE RIGHT EYE 2 TIMES A DAY. (Patient not taking: Reported on 10/26/2023)  -     predniSONE 10 mg tablet; TAKE 4 TABS X 3 DAYS, 3 TABS X 3 DAYS, 2 TABS X 3 DAYS, 1 TAB X 3 DAYS TAKE WITH FOOD (Patient not taking: Reported on 10/26/2023)  -     triamcinolone (KENALOG) 0.1 % cream; APPLY TO AFFECTED AREA TWICE DAILY TO BODY X2 WEEKS. THEN AS NEEDED FOR FLARES.  AVOID FACE AND GROIN         Problem List Items Addressed This Visit          Other    Injury of right ankle, initial encounter - Primary    Relevant Orders    X-ray ankle right 3+ views    Ankle Cude ankle/Ankle Brace     Other Visit Diagnoses       Peroneal tendonitis of right lower extremity        Relevant Medications    meloxicam (Mobic) 15 mg tablet    Pes cavus of right foot              PLAN  -Young Better and I discussed his condition in detail  -Rx meloxicam  -Rx ASO brace  -Recommended supportive sneakers with over-the-counter inserts  -Recommend purchase of Voltaren gel  -Return to clinic 4 weeks, if there is no improvement in patient's symptoms we will consider physical therapy and possibly an MRI. We will also plan on obtaining calcaneal axial as well as foot radiographs given pes cavus deformity    X-ray of right foot from 10/26/2023 interpreted independently: No acute osseous abnormality noted. No abnormalities noted within the soft tissues. SUBJECTIVE    Chief Complaint:  Right ankle pain     Patient ID: Dylan Mccarty is a 60 y.o. male. 10/26/2023: Dmitriy Degroot is a pleasant 18-year-old male who presents today with 10-day history of right ankle pain. 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 He states that his right ankle has become extremely sore since this time. He states that the pain is worse with activity and better at rest.  Thus far for treatment he has tried ice and Motrin. Also of note, he has seen a podiatrist in the past for his bilateral cavus foot deformity, he was given custom molded orthotics which he has yet to . The following portions of the patient's history were reviewed and updated as appropriate: allergies, current medications, past family history, past medical history, past social history, past surgical history and problem list.    Review of Systems   Constitutional: Negative. Respiratory: Negative. Cardiovascular: Negative. Gastrointestinal: Negative. Genitourinary: Negative. Musculoskeletal:  Positive for myalgias. Skin: Negative. OBJECTIVE      /73   Pulse (!) 52   Ht 5' 9" (1.753 m)   Wt 89.4 kg (197 lb)   BMI 29.09 kg/m²        Physical Exam  Constitutional:       Appearance: Normal appearance. HENT:      Head: Normocephalic and atraumatic. Eyes:      General:         Right eye: No discharge. Left eye: No discharge. Cardiovascular:      Rate and Rhythm: Normal rate and regular rhythm. Pulses:           Dorsalis pedis pulses are 2+ on the right side and 2+ on the left side. Posterior tibial pulses are 2+ on the right side and 2+ on the left side. Pulmonary:      Effort: Pulmonary effort is normal.      Breath sounds: Normal breath sounds. Skin:     General: Skin is warm. Capillary Refill: Capillary refill takes less than 2 seconds. Neurological:      Mental Status: He is alert and oriented to person, place, and time. Sensory: Sensation is intact. No sensory deficit. Psychiatric:         Mood and Affect: Mood normal.           MSK:  -Pain on palpation to right lateral ankle/foot along the course of the peroneal tendons. Pain on palpation is centered distal and inferior to the lateral malleolus but proximal to the peroneus brevis insertion site.  -Pain with plantarflexion and eversion against resistance  -I note pes cavus deformity of the right foot with plantarflexed first ray, increased medial arch height, varus heel position.   -MMT is 5/5 to all muscle compartments of the lower extremity  -Ankle dorsiflexion >10 degrees with knee extended and knee flexed b/l    Derm:  -No lesions, abrasions, or open wounds noted  -No noted interdigital maceration, peeling, malodor  -No callus formation noted on exam  -Edema noted to the lateral right foot consistent with the location of the patient's peroneal tendon pain

## 2023-10-26 NOTE — PATIENT INSTRUCTIONS
Purchase a supportive over-the-counter pair of inserts such as Superfeet, powersteps, [employer]   Purchase voltaren gel, apply to the right ankle 4x daily.

## 2023-11-27 ENCOUNTER — OFFICE VISIT (OUTPATIENT)
Age: 63
End: 2023-11-27
Payer: COMMERCIAL

## 2023-11-27 VITALS
BODY MASS INDEX: 29.18 KG/M2 | WEIGHT: 197 LBS | DIASTOLIC BLOOD PRESSURE: 82 MMHG | HEART RATE: 47 BPM | SYSTOLIC BLOOD PRESSURE: 146 MMHG | HEIGHT: 69 IN

## 2023-11-27 DIAGNOSIS — M76.71 PERONEAL TENDONITIS OF RIGHT LOWER EXTREMITY: ICD-10-CM

## 2023-11-27 PROCEDURE — 99213 OFFICE O/P EST LOW 20 MIN: CPT | Performed by: STUDENT IN AN ORGANIZED HEALTH CARE EDUCATION/TRAINING PROGRAM

## 2023-11-27 RX ORDER — MELOXICAM 15 MG/1
15 TABLET ORAL DAILY
Qty: 30 TABLET | Refills: 0 | Status: SHIPPED | OUTPATIENT
Start: 2023-11-27

## 2023-11-28 NOTE — PROGRESS NOTES
This patient was seen on 11/27/2023. My role is Foot , Ankle, and Wound Specialist    ASSESSMENT     Diagnoses and all orders for this visit:    Peroneal tendonitis of right lower extremity  -     meloxicam (Mobic) 15 mg tablet; Take 1 tablet (15 mg total) by mouth daily  -     Ambulatory referral to Physical Therapy; Future         Problem List Items Addressed This Visit    None  Visit Diagnoses       Peroneal tendonitis of right lower extremity        Relevant Medications    meloxicam (Mobic) 15 mg tablet    Other Relevant Orders    Ambulatory referral to Physical Therapy          PLAN  -Mouna Zacarias and I discussed his right foot in detail  -Continue use of meloxicam, Voltaren gel, ASO brace  -Continue supportive sneakers with over-the-counter inserts  -We will attempt a course of formal physical therapy given lack of improvement since the patient's last visit  -Return to clinic 6 weeks, we will consider possible MRI at this point should the patient's symptoms not improve    SUBJECTIVE    Chief Complaint:  Right ankle pain     Patient ID: Kristine Schlatter     10/26/2023: Mouna Zacarias is a pleasant 79-year-old male who presents today with 10-day history of right ankle pain. 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 He states that his right ankle has become extremely sore since this time. He states that the pain is worse with activity and better at rest.  Thus far for treatment he has tried ice and Motrin. Also of note, he has seen a podiatrist in the past for his bilateral cavus foot deformity, he was given custom molded orthotics which he has yet to .    11/27/2023: Mouna Zacarias states that his right ankle was feeling slightly better initially, however then began to feel much worse again. He states that he has been utilizing his meloxicam as well as Voltaren gel which she believes has made a difference.   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 The following portions of the patient's history were reviewed and updated as appropriate: allergies, current medications, past family history, past medical history, past social history, past surgical history and problem list.    Review of Systems   Constitutional: Negative. Respiratory: Negative. Cardiovascular: Negative. Gastrointestinal: Negative. Genitourinary: Negative. Musculoskeletal:  Positive for myalgias. OBJECTIVE      /82   Pulse (!) 47   Ht 5' 9" (1.753 m)   Wt 89.4 kg (197 lb)   BMI 29.09 kg/m²        Physical Exam  Constitutional:       Appearance: Normal appearance. HENT:      Head: Normocephalic and atraumatic. Eyes:      General:         Right eye: No discharge. Left eye: No discharge. Cardiovascular:      Rate and Rhythm: Normal rate and regular rhythm. Pulses:           Dorsalis pedis pulses are 2+ on the right side and 2+ on the left side. Posterior tibial pulses are 2+ on the right side and 2+ on the left side. Pulmonary:      Effort: Pulmonary effort is normal.      Breath sounds: Normal breath sounds. Skin:     General: Skin is warm. Capillary Refill: Capillary refill takes less than 2 seconds. Neurological:      Mental Status: He is alert and oriented to person, place, and time. Sensory: Sensation is intact. No sensory deficit. Psychiatric:         Mood and Affect: Mood normal.           MSK:  -Pain on palpation to right lateral ankle/foot along the course of the peroneal tendons. Pain on palpation is centered distal and inferior to the lateral malleolus but proximal to the peroneus brevis insertion site.  -Pain with plantarflexion and eversion against resistance  -I note pes cavus deformity of the right foot with plantarflexed first ray, increased medial arch height, varus heel position.   -MMT is 5/5 to all muscle compartments of the lower extremity  -Ankle dorsiflexion >10 degrees with knee extended and knee flexed b/l     Derm:  -No lesions, abrasions, or open wounds noted  -No noted interdigital maceration, peeling, malodor  -No callus formation noted on exam  -Edema noted to the lateral right foot consistent with the location of the patient's peroneal tendon pain

## 2023-12-19 ENCOUNTER — OFFICE VISIT (OUTPATIENT)
Dept: URGENT CARE | Facility: CLINIC | Age: 63
End: 2023-12-19
Payer: COMMERCIAL

## 2023-12-19 VITALS
WEIGHT: 207.8 LBS | DIASTOLIC BLOOD PRESSURE: 70 MMHG | TEMPERATURE: 95.1 F | HEART RATE: 44 BPM | SYSTOLIC BLOOD PRESSURE: 128 MMHG | BODY MASS INDEX: 30.78 KG/M2 | OXYGEN SATURATION: 97 % | HEIGHT: 69 IN | RESPIRATION RATE: 18 BRPM

## 2023-12-19 DIAGNOSIS — M62.830 MUSCLE SPASM OF BACK: Primary | ICD-10-CM

## 2023-12-19 PROCEDURE — 99213 OFFICE O/P EST LOW 20 MIN: CPT | Performed by: PHYSICIAN ASSISTANT

## 2023-12-19 RX ORDER — METAXALONE 800 MG/1
800 TABLET ORAL 3 TIMES DAILY
Qty: 15 TABLET | Refills: 0 | Status: SHIPPED | OUTPATIENT
Start: 2023-12-19 | End: 2023-12-24

## 2023-12-19 NOTE — PROGRESS NOTES
[facility]        NAME: Figueroa Chaudhry is a 63 y.o. male  : 1960    MRN: [id number]  DATE: 2023  TIME: 12:07 PM    Assessment and Plan   Muscle spasm of back [M62.830]  1. Muscle spasm of back  metaxalone (SKELAXIN) 800 mg tablet        Patient Instructions   Muscle spasm of the left mid back  Rx Skelaxin 3 times a day as needed, sent via EMR  Recommend topical Voltaren gel as needed  Also recommend heat application as needed  Rest if no improvement follow-up with Ortho in 1 week    Follow up with PCP in 3-5 days.  Proceed to  ER if symptoms worsen.    Chief Complaint     Chief Complaint   Patient presents with    Back Pain     X 1 week - twisted L mid back area - pain worse when sitting/laying. Using Tylenol. Motrin. No ice/heat         History of Present Illness       Figueroa is a 63-year-old male who presents to clinic complaining of left-sided mid back pain x 1 week.  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  He states it progressively worsened over the week.  He describes the pain as tight and sharp.  He denies any radiation of the pain.  He denies any pain while standing.  He notes the most pain with sitting or laying down.  He denies any incontinence of bowel or bladder.  He denies any shortness of breath or difficulty breathing but notes increased pain with sneezing.        Review of Systems   Review of Systems   Constitutional: Negative.    Respiratory:  Negative for shortness of breath.    Musculoskeletal:  Positive for back pain.         Current Medications       Current Outpatient Medications:     albuterol (PROVENTIL HFA,VENTOLIN HFA) 90 mcg/act inhaler, INHALE 2 PUFFS EVERY 6 HOURS AS NEEDED FOR WHEEZING, Disp: 18 g, Rfl: 0    ketotifen (ZADITOR) 0.025 % ophthalmic solution, , Disp: , Rfl:     metaxalone (SKELAXIN) 800 mg tablet, Take 1 tablet (800 mg total) by mouth 3 (three) times a day  "for 5 days, Disp: 15 tablet, Rfl: 0    Ivermectin (Soolantra) 1 % CREA, APPLY THIN LAYER TO FACE AT BEDTIME (Patient not taking: Reported on 11/27/2023), Disp: , Rfl:     Current Allergies     Allergies as of 12/19/2023    (No Known Allergies)            The following portions of the patient's history were reviewed and updated as appropriate: allergies, current medications, past family history, past medical history, past social history, past surgical history and problem list.     Past Medical History:   Diagnosis Date    Arthritis     knees    Asthma     Cancer (HCC)     stg 1-prostate    Colon polyp     Extrinsic asthma     High arches     Plantar fasciitis     Undescended right testis        Past Surgical History:   Procedure Laterality Date    BILATERAL HIP ARTHROSCOPY      resurfaced    COLONOSCOPY      HERNIA REPAIR Bilateral     age 4yr    KNEE ARTHROSCOPY Bilateral     meniscus repair    LASIK      ORCHIECTOMY      TONSILLECTOMY      WISDOM TOOTH EXTRACTION         Family History   Problem Relation Age of Onset    Bone cancer Father     Prostate cancer Father     Cancer Father         prostate w/mets    COPD Mother         emphysema-smoker    Hypertension Mother     Mental illness Neg Hx          Medications have been verified.        Objective   /70   Pulse (!) 44   Temp (!) 95.1 °F (35.1 °C)   Resp 18   Ht 5' 9\" (1.753 m)   Wt 94.3 kg (207 lb 12.8 oz)   SpO2 97%   BMI 30.69 kg/m²   No LMP for male patient.       Physical Exam     Physical Exam  Vitals and nursing note reviewed.   Constitutional:       General: He is not in acute distress.     Appearance: Normal appearance. He is not ill-appearing.   Pulmonary:      Effort: Pulmonary effort is normal.      Breath sounds: Normal breath sounds.   Musculoskeletal:      Thoracic back: Spasms and tenderness present. No swelling, edema or bony tenderness. Decreased range of motion.      Lumbar back: Normal.   Neurological:      Mental Status: He is " alert and oriented to person, place, and time.   Psychiatric:         Mood and Affect: Mood normal.         Behavior: Behavior normal.

## 2024-01-09 ENCOUNTER — OFFICE VISIT (OUTPATIENT)
Dept: FAMILY MEDICINE CLINIC | Facility: CLINIC | Age: 64
End: 2024-01-09
Payer: COMMERCIAL

## 2024-01-09 VITALS
SYSTOLIC BLOOD PRESSURE: 130 MMHG | TEMPERATURE: 97.6 F | RESPIRATION RATE: 18 BRPM | HEART RATE: 50 BPM | BODY MASS INDEX: 30.36 KG/M2 | DIASTOLIC BLOOD PRESSURE: 80 MMHG | HEIGHT: 69 IN | WEIGHT: 205 LBS

## 2024-01-09 DIAGNOSIS — M62.830 SPASM OF THORACIC BACK MUSCLE: Primary | ICD-10-CM

## 2024-01-09 PROCEDURE — 99213 OFFICE O/P EST LOW 20 MIN: CPT | Performed by: NURSE PRACTITIONER

## 2024-01-09 RX ORDER — NAPROXEN 500 MG/1
500 TABLET ORAL 2 TIMES DAILY WITH MEALS
Qty: 14 TABLET | Refills: 0 | Status: SHIPPED | OUTPATIENT
Start: 2024-01-09 | End: 2024-01-17

## 2024-01-09 NOTE — PROGRESS NOTES
Assessment/Plan:    To use muscle relaxant consistently at bedtime in addition to Naproxen.   Continue moist heat, massage therapy.   F/u as needed    1. Spasm of thoracic back muscle  -     naproxen (Naprosyn) 500 mg tablet; Take 1 tablet (500 mg total) by mouth 2 (two) times a day with meals            There are no Patient Instructions on file for this visit.    Return if symptoms worsen or fail to improve.    Subjective:      Patient ID: Figueroa Chaudhry is a 63 y.o. male.    Chief Complaint   Patient presents with   • Back Pain     Middle left side of back pain- for about a month lw cma       Here today with left sided back pain.  Ongoing for the past month.  Reports at onset, he felt a spasm in that area and it has not let up.  Was seen at urgent care and prescribed a muscle relaxant, which he has not taken consistently for fear of drowsiness while working.  Feels better with standing.  Referees sports, and does not have the pain then.  Worse with sitting.      Back Pain  This is a new problem. The current episode started 1 to 4 weeks ago. The problem occurs daily. The problem has been waxing and waning since onset. The pain is present in the thoracic spine. The quality of the pain is described as stabbing. The pain does not radiate. The pain is at a severity of 7/10. The pain is The same all the time. The symptoms are aggravated by sitting. Stiffness is present In the morning and all day. Pertinent negatives include no abdominal pain, bladder incontinence, bowel incontinence, chest pain, dysuria, fever, headaches, leg pain, numbness, paresis, paresthesias, pelvic pain, perianal numbness, tingling, weakness or weight loss. Risk factors include history of cancer and history of osteoporosis.       The following portions of the patient's history were reviewed and updated as appropriate: allergies, current medications, past family history, past medical history, past social history, past surgical history and  "problem list.    Review of Systems   Constitutional:  Negative for chills, fever and weight loss.   Cardiovascular:  Negative for chest pain.   Gastrointestinal:  Negative for abdominal pain, bowel incontinence, nausea and vomiting.   Genitourinary:  Negative for bladder incontinence, dysuria and pelvic pain.   Musculoskeletal:  Positive for back pain.   Neurological:  Negative for tingling, weakness, numbness, headaches and paresthesias.         Current Outpatient Medications   Medication Sig Dispense Refill   • albuterol (PROVENTIL HFA,VENTOLIN HFA) 90 mcg/act inhaler INHALE 2 PUFFS EVERY 6 HOURS AS NEEDED FOR WHEEZING 18 g 0   • naproxen (Naprosyn) 500 mg tablet Take 1 tablet (500 mg total) by mouth 2 (two) times a day with meals 14 tablet 0   • metaxalone (SKELAXIN) 800 mg tablet Take 1 tablet (800 mg total) by mouth 3 (three) times a day for 5 days (Patient not taking: Reported on 1/9/2024) 15 tablet 0     No current facility-administered medications for this visit.       Objective:    /80   Pulse (!) 50   Temp 97.6 °F (36.4 °C)   Resp 18   Ht 5' 9\" (1.753 m)   Wt 93 kg (205 lb)   BMI 30.27 kg/m²        Physical Exam  Vitals and nursing note reviewed.   Constitutional:       General: He is not in acute distress.     Appearance: Normal appearance. He is well-developed. He is not ill-appearing or diaphoretic.   Eyes:      Conjunctiva/sclera: Conjunctivae normal.   Pulmonary:      Effort: Pulmonary effort is normal. No respiratory distress.   Abdominal:      Tenderness: There is no abdominal tenderness. There is no right CVA tenderness or left CVA tenderness.   Musculoskeletal:        Back:    Skin:     Coloration: Skin is not pale.   Neurological:      Mental Status: He is alert.   Psychiatric:         Mood and Affect: Mood normal.         Speech: Speech normal.         Behavior: Behavior normal.                MANNY Chaidez  "

## 2024-01-10 ENCOUNTER — PATIENT MESSAGE (OUTPATIENT)
Dept: FAMILY MEDICINE CLINIC | Facility: CLINIC | Age: 64
End: 2024-01-10

## 2024-01-10 DIAGNOSIS — M62.830 MUSCLE SPASM OF BACK: ICD-10-CM

## 2024-01-10 RX ORDER — METAXALONE 800 MG/1
800 TABLET ORAL 3 TIMES DAILY
Qty: 15 TABLET | Refills: 0 | Status: SHIPPED | OUTPATIENT
Start: 2024-01-10 | End: 2024-01-15

## 2024-01-10 NOTE — TELEPHONE ENCOUNTER
Regarding: Refill   Contact: [phone]  ----- Message from Blanka Escobar sent at 1/10/2024  1:34 PM EST -----       ----- Message from Figueroa Chaudhry to MANNY Chaidez sent at 1/10/2024  1:29 PM -----   I had 4 pills left from my previous prescription from . I could use a refill of metaxalone 800mg. It seems like it is working with my other prescription.

## 2024-01-16 DIAGNOSIS — M62.830 SPASM OF THORACIC BACK MUSCLE: ICD-10-CM

## 2024-01-17 RX ORDER — NAPROXEN 500 MG/1
500 TABLET ORAL 2 TIMES DAILY WITH MEALS
Qty: 14 TABLET | Refills: 0 | Status: SHIPPED | OUTPATIENT
Start: 2024-01-17

## 2024-01-31 ENCOUNTER — APPOINTMENT (OUTPATIENT)
Dept: RADIOLOGY | Facility: CLINIC | Age: 64
End: 2024-01-31
Payer: COMMERCIAL

## 2024-01-31 ENCOUNTER — OFFICE VISIT (OUTPATIENT)
Dept: OBGYN CLINIC | Facility: CLINIC | Age: 64
End: 2024-01-31
Payer: COMMERCIAL

## 2024-01-31 VITALS
HEART RATE: 60 BPM | SYSTOLIC BLOOD PRESSURE: 148 MMHG | BODY MASS INDEX: 30.36 KG/M2 | WEIGHT: 205 LBS | HEIGHT: 69 IN | DIASTOLIC BLOOD PRESSURE: 81 MMHG

## 2024-01-31 DIAGNOSIS — G89.29 CHRONIC LEFT-SIDED LOW BACK PAIN WITHOUT SCIATICA: ICD-10-CM

## 2024-01-31 DIAGNOSIS — M54.50 CHRONIC LEFT-SIDED LOW BACK PAIN WITHOUT SCIATICA: ICD-10-CM

## 2024-01-31 DIAGNOSIS — M43.06 LUMBAR SPONDYLOLYSIS: Primary | ICD-10-CM

## 2024-01-31 PROCEDURE — 99214 OFFICE O/P EST MOD 30 MIN: CPT | Performed by: ORTHOPAEDIC SURGERY

## 2024-01-31 PROCEDURE — 72100 X-RAY EXAM L-S SPINE 2/3 VWS: CPT

## 2024-01-31 RX ORDER — KETOTIFEN FUMARATE 0.25 MG/ML
SOLUTION/ DROPS OPHTHALMIC
COMMUNITY

## 2024-01-31 RX ORDER — PREDNISONE 10 MG/1
TABLET ORAL
COMMUNITY

## 2024-01-31 NOTE — PROGRESS NOTES
Assessment/Plan:  1. Lumbar spondylolysis  XR spine lumbar 2 or 3 views injury    Ambulatory Referral to Physical Therapy        Scribe Attestation      I,:  Gomez Arredondo am acting as a scribe while in the presence of the attending physician.:       I,:  Flex Grimaldo, DO personally performed the services described in this documentation    as scribed in my presence.:               Figueroa and I reviewed his back x-rays together.  There is evidence of degenerative scoliosis.  There is degenerative changes at multiple levels at the lumbar spine most significant at L5-S1.  This is causing the stiffness he is experiencing and undo stress on the supporting musculature.  In my opinion he would benefit from skilled physical therapy to improve his motion and core strength.  Fortunately he has no neurological symptoms.  Would like him to attend physical therapy over the next 4 to 6 weeks and return to see me.  If there is no improvement I may consider an MRI.  Figueroa was agreeable with this plan of care and had no further questions.    Subjective:   Figueroa Chaudhry is a 63 y.o. male who presents for left-sided back pain that has been ongoing since December 16,2023.  He states he was simply preparing for a basketball game and changing into his uniform when he experienced a grabbing sensation into his left low back region.  Figueroa states this has been ongoing since then.  He complains of persistent stiffness in the left lumbar region particularly with rotational movements.  This will worsen with prolonged sitting. 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  Pivoting his hips is limited secondary to his stiffness.  He denies radiating pain he denies distal paresthesias.  He will apply heat to his back which seems to help.  No history of a traumatic incident.  He was seen in at a [facility] facility on December 19 for his back pain.  He was diagnosed with a muscle spasm and prescribed Skelaxin. He  did not prefer to take the muscle  relaxant as it makes him drowsy.      Review of Systems   Constitutional:  Negative for chills, fever and unexpected weight change.   HENT:  Negative for hearing loss, nosebleeds and sore throat.    Eyes:  Negative for pain, redness and visual disturbance.   Respiratory:  Negative for cough, shortness of breath and wheezing.    Cardiovascular:  Negative for chest pain, palpitations and leg swelling.   Gastrointestinal:  Negative for abdominal pain, nausea and vomiting.   Endocrine: Negative for polyphagia and polyuria.   Genitourinary:  Negative for dysuria and hematuria.   Musculoskeletal:         See HPI   Skin:  Negative for rash and wound.   Neurological:  Negative for dizziness, numbness and headaches.   Psychiatric/Behavioral:  Negative for decreased concentration and suicidal ideas. The patient is not nervous/anxious.          Past Medical History:   Diagnosis Date    Arthritis     knees    Asthma     Cancer (HCC)     stg 1-prostate    Colon polyp     Extrinsic asthma     High arches     Plantar fasciitis     Undescended right testis        Past Surgical History:   Procedure Laterality Date    BILATERAL HIP ARTHROSCOPY      resurfaced    COLONOSCOPY      HERNIA REPAIR Bilateral     age 4yr    KNEE ARTHROSCOPY Bilateral     meniscus repair    LASIK      ORCHIECTOMY      TONSILLECTOMY      WISDOM TOOTH EXTRACTION         Family History   Problem Relation Age of Onset    Bone cancer Father     Prostate cancer Father     Cancer Father         prostate w/mets    COPD Mother         emphysema-smoker    Hypertension Mother     Mental illness Neg Hx        Social History     Occupational History    Not on file   Tobacco Use    Smoking status: Former     Current packs/day: 0.00     Average packs/day: 1 pack/day for 11.0 years (11.0 ttl pk-yrs)     Types: Cigarettes     Start date: 1971     Quit date: 1982     Years since quittin.1    Smokeless tobacco: Never    Tobacco comments:     quit 35 yrs ago    Vaping Use    Vaping status: Never Used   Substance and Sexual Activity    Alcohol use: Yes     Alcohol/week: 2.0 standard drinks of alcohol     Types: 2 Glasses of wine per week     Comment: social    Drug use: Never    Sexual activity: Yes     Partners: Female, Male     Birth control/protection: None         Current Outpatient Medications:     albuterol (PROVENTIL HFA,VENTOLIN HFA) 90 mcg/act inhaler, INHALE 2 PUFFS EVERY 6 HOURS AS NEEDED FOR WHEEZING, Disp: 18 g, Rfl: 0    naproxen (NAPROSYN) 500 mg tablet, TAKE 1 TABLET BY MOUTH TWICE A DAY WITH MEALS, Disp: 14 tablet, Rfl: 0    ketotifen (ZADITOR) 0.025 % ophthalmic solution, ADMINISTER 1 DROP TO THE RIGHT EYE 2 TIMES A DAY. (Patient not taking: Reported on 1/31/2024), Disp: , Rfl:     metaxalone (SKELAXIN) 800 mg tablet, Take 1 tablet (800 mg total) by mouth 3 (three) times a day for 5 days, Disp: 15 tablet, Rfl: 0    predniSONE 10 mg tablet, TAKE 4 TABS X 3 DAYS, 3 TABS X 3 DAYS, 2 TABS X 3 DAYS, 1 TAB X 3 DAYS TAKE WITH FOOD (Patient not taking: Reported on 1/31/2024), Disp: , Rfl:     No Known Allergies    Objective:  Vitals:    01/31/24 1056   BP: 148/81   Pulse: 60       Back Exam     Tenderness   Back tenderness location: Left paraspinal L4-L5.    Range of Motion   Extension:  abnormal   Flexion:  70   Lateral bend right:  normal   Lateral bend left:  normal   Rotation right:  abnormal   Rotation left:  abnormal     Muscle Strength   Right Quadriceps:  5/5   Left Quadriceps:  5/5   Right Hamstrings:  5/5   Left Hamstrings:  5/5     Tests   Straight leg raise right: negative  Straight leg raise left: negative    Other   Sensation: normal  Gait: normal             Physical Exam  Vitals reviewed.   Constitutional:       Appearance: He is well-developed.   HENT:      Head: Normocephalic and atraumatic.   Eyes:      General:         Right eye: No discharge.         Left eye: No discharge.      Conjunctiva/sclera: Conjunctivae normal.   Cardiovascular:       Rate and Rhythm: Regular rhythm.   Pulmonary:      Effort: Pulmonary effort is normal. No respiratory distress.   Musculoskeletal:      Cervical back: Normal range of motion and neck supple. Pain with movement present.      Thoracic back: No bony tenderness.      Lumbar back: Negative right straight leg raise test and negative left straight leg raise test.        Back:       Comments: As noted in the HPI.   Skin:     General: Skin is warm and dry.   Neurological:      Mental Status: He is alert and oriented to person, place, and time.   Psychiatric:         Behavior: Behavior normal.         I have personally reviewed pertinent films in PACS and my interpretation is as follows:  X-rays of the lumbar spine taken today show evidence of degenerative scoliosis.  There is degenerative changes at multiple levels most significantly at L5-S1.      This document was created using speech voice recognition software.   Grammatical errors, random word insertions, pronoun errors, and incomplete sentences are an occasional consequence of this system due to software limitations, ambient noise, and hardware issues.   Any formal questions or concerns about content, text, or information contained within the body of this dictation should be directly addressed to the provider for clarification.

## 2024-02-07 ENCOUNTER — OFFICE VISIT (OUTPATIENT)
Dept: OBGYN CLINIC | Facility: CLINIC | Age: 64
End: 2024-02-07
Payer: COMMERCIAL

## 2024-02-07 ENCOUNTER — APPOINTMENT (OUTPATIENT)
Dept: RADIOLOGY | Facility: CLINIC | Age: 64
End: 2024-02-07
Payer: COMMERCIAL

## 2024-02-07 VITALS
WEIGHT: 200 LBS | BODY MASS INDEX: 29.62 KG/M2 | SYSTOLIC BLOOD PRESSURE: 115 MMHG | HEIGHT: 69 IN | DIASTOLIC BLOOD PRESSURE: 75 MMHG | HEART RATE: 50 BPM

## 2024-02-07 DIAGNOSIS — M25.562 PAIN IN BOTH KNEES, UNSPECIFIED CHRONICITY: ICD-10-CM

## 2024-02-07 DIAGNOSIS — M25.561 PAIN IN BOTH KNEES, UNSPECIFIED CHRONICITY: ICD-10-CM

## 2024-02-07 DIAGNOSIS — M17.0 BILATERAL PRIMARY OSTEOARTHRITIS OF KNEE: Primary | ICD-10-CM

## 2024-02-07 PROCEDURE — 99214 OFFICE O/P EST MOD 30 MIN: CPT | Performed by: ORTHOPAEDIC SURGERY

## 2024-02-07 PROCEDURE — 73562 X-RAY EXAM OF KNEE 3: CPT

## 2024-02-07 NOTE — PROGRESS NOTES
Assessment/Plan:  1. Bilateral primary osteoarthritis of knee  Injection Procedure Prior Authorization      2. Pain in both knees, unspecified chronicity  XR knee 3 vw right non injury    XR knee 3 vw left non injury        Scribe Attestation      I,:  Denisa Morganerik am acting as a scribe while in the presence of the attending physician.:       I,:  Alberto Mina, DO personally performed the services described in this documentation    as scribed in my presence.:           Figueroa is a pleasant 63-year-old male who presents to the office today for initial evaluation of bilateral knee pain due to severe underlying osteoarthritis.  He has found minimal relief from non-operative treatments including cortisone injections, bracing, activity modification, oral analgesics. He states he is interested in proceeding with total knee arthroplasty, left before right, but would like to defer until next year. He has found significant relief in the past from VISCO supplementation, last performed 10 months ago, therefore this will be ordered today and administered after insurance approval. Our office will aid in scheduling this appointment for Figueroa.     Bilateral knee visco-supplement was ordered as this had provided significant decrease of pain, inflammation and crepitus with last application.  The patient has on-going knee pain and stiffness in which interferes with their daily activity and sleep.  Examination of knee includes crepitus with range of motion.  The patient rates their pain a 10/10 at times.  The patient has tried home exercise program learned from past physical therapy.  Bracing has not provided relief.  The patient has tried oral medications and steroid injections with limited benefit.       Subjective: initial evaluation of bilateral knees    Patient ID: Figueroa Chaudhry is a 63 y.o. male who presents today for initial evaluation of bilateral knee pain. He has treated this in the past with an outside  provider. He describes bilateral, left greater than right, knee pain that is achey in nature. Pain is located both medially and laterally and increases with increased activity and prolonged weight bearing. He does note associated swelling when symptoms are worst. 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 Denies sensation of instability, paraesthesias. He has a history of bilateral knee arthroscopies performed 6 and 8 years ago. He has found relief with VISCO supplementation, which was most recently performed 10 months ago.  He has had cortisone injections in the past with minimal relief.     Review of Systems   Constitutional:  Negative for chills and fever.   HENT:  Negative for ear pain and sore throat.    Eyes:  Negative for pain and visual disturbance.   Respiratory:  Negative for cough and shortness of breath.    Cardiovascular:  Negative for chest pain and palpitations.   Gastrointestinal:  Negative for abdominal pain and vomiting.   Genitourinary:  Negative for dysuria and hematuria.   Musculoskeletal:  Positive for arthralgias. Negative for back pain.   Skin:  Negative for color change and rash.   Neurological:  Negative for seizures and syncope.   All other systems reviewed and are negative.        Past Medical History:   Diagnosis Date    Arthritis     knees    Asthma     Cancer (HCC)     stg 1-prostate    Colon polyp     Extrinsic asthma     High arches     Plantar fasciitis     Undescended right testis        Past Surgical History:   Procedure Laterality Date    BILATERAL HIP ARTHROSCOPY      resurfaced    COLONOSCOPY      HERNIA REPAIR Bilateral     age 4yr    KNEE ARTHROSCOPY Bilateral     meniscus repair    LASIK      ORCHIECTOMY      TONSILLECTOMY      WISDOM TOOTH EXTRACTION         Family History   Problem Relation Age of Onset    Bone cancer Father     Prostate cancer Father     Cancer Father         prostate w/mets    COPD Mother          emphysema-smoker    Hypertension Mother     Mental illness Neg Hx        Social History     Occupational History    Not on file   Tobacco Use    Smoking status: Former     Current packs/day: 0.00     Average packs/day: 1 pack/day for 11.0 years (11.0 ttl pk-yrs)     Types: Cigarettes     Start date: 1971     Quit date: 1982     Years since quittin.1    Smokeless tobacco: Never    Tobacco comments:     quit 35 yrs ago   Vaping Use    Vaping status: Never Used   Substance and Sexual Activity    Alcohol use: Yes     Alcohol/week: 2.0 standard drinks of alcohol     Types: 2 Glasses of wine per week     Comment: social    Drug use: Never    Sexual activity: Yes     Partners: Female, Male     Birth control/protection: None         Current Outpatient Medications:     naproxen (NAPROSYN) 500 mg tablet, TAKE 1 TABLET BY MOUTH TWICE A DAY WITH MEALS, Disp: 14 tablet, Rfl: 0    albuterol (PROVENTIL HFA,VENTOLIN HFA) 90 mcg/act inhaler, INHALE 2 PUFFS EVERY 6 HOURS AS NEEDED FOR WHEEZING, Disp: 18 g, Rfl: 0    ketotifen (ZADITOR) 0.025 % ophthalmic solution, ADMINISTER 1 DROP TO THE RIGHT EYE 2 TIMES A DAY. (Patient not taking: Reported on 2024), Disp: , Rfl:     metaxalone (SKELAXIN) 800 mg tablet, Take 1 tablet (800 mg total) by mouth 3 (three) times a day for 5 days, Disp: 15 tablet, Rfl: 0    predniSONE 10 mg tablet, TAKE 4 TABS X 3 DAYS, 3 TABS X 3 DAYS, 2 TABS X 3 DAYS, 1 TAB X 3 DAYS TAKE WITH FOOD (Patient not taking: Reported on 2024), Disp: , Rfl:     No Known Allergies    Objective:  Vitals:    24 1109   BP: 115/75   Pulse: (!) 50       Body mass index is 29.53 kg/m².    Right Knee Exam     Tenderness   The patient is experiencing tenderness in the medial joint line and lateral joint line.    Range of Motion   Extension:  0   Flexion:  130     Tests   Varus: negative Valgus: negative  Patellar apprehension: negative    Other   Erythema: absent  Scars: absent  Sensation: normal  Pulse:  present  Effusion: effusion (trace) present    Comments:    Knee stable at 0, 30, 90 degrees  Palpable prepatellar bony   + Patellar grind  + peripatellar crepitation  Skin intact and well perfused  Sensation intact in DP/SP/Peña/Sa/T nerve distributions  2+ DP & PT pulses  Brisk capillary refill in all toes        Left Knee Exam     Tenderness   The patient is experiencing tenderness in the lateral joint line and medial joint line.    Range of Motion   Extension:  0   Flexion:  130     Tests   Varus: negative Valgus: negative  Patellar apprehension: negative    Other   Erythema: absent  Scars: absent  Sensation: normal  Pulse: present  Effusion: effusion (trace) present    Comments:    Knee stable at 0, 30, 90 degrees  + Patellar grind  + peripatellar crepitation  Skin intact and well perfused  Sensation intact in DP/SP/Peña/Sa/T nerve distributions  2+ DP & PT pulses  Brisk capillary refill in all toes            Observations   Left Knee   Positive for effusion (trace).     Right Knee   Positive for effusion (trace).       Physical Exam  Vitals and nursing note reviewed.   Constitutional:       Appearance: Normal appearance.   HENT:      Head: Normocephalic and atraumatic.      Right Ear: External ear normal.      Left Ear: External ear normal.      Nose: Nose normal.   Eyes:      Extraocular Movements: Extraocular movements intact.      Conjunctiva/sclera: Conjunctivae normal.   Cardiovascular:      Rate and Rhythm: Normal rate and regular rhythm.      Pulses: Normal pulses.      Heart sounds: Normal heart sounds.   Pulmonary:      Effort: Pulmonary effort is normal. No respiratory distress.   Abdominal:      Palpations: Abdomen is soft.   Musculoskeletal:      Cervical back: Normal range of motion and neck supple.      Right knee: Effusion (trace) present.      Left knee: Effusion (trace) present.      Comments: See Ortho Exam   Skin:     General: Skin is warm and dry.      Capillary Refill: Capillary refill takes  less than 2 seconds.   Neurological:      General: No focal deficit present.      Mental Status: He is alert and oriented to person, place, and time.   Psychiatric:         Mood and Affect: Mood normal.         Behavior: Behavior normal.         Thought Content: Thought content normal.         Judgment: Judgment normal.         I have personally reviewed pertinent films in PACS.    X-ray of bilateral knees obtained today reviewed and demonstrate: Severe tricompartmental osteoarthritis wit near complete loss of joint space, sclerotic changes, osteophyte formation.    This document was created using speech voice recognition software.   Grammatical errors, random word insertions, pronoun errors, and incomplete sentences are an occasional consequence of this system due to software limitations, ambient noise, and hardware issues.   Any formal questions or concerns about content, text, or information contained within the body of this dictation should be directly addressed to the provider for clarification.

## 2024-02-12 ENCOUNTER — TELEPHONE (OUTPATIENT)
Dept: PHYSICAL THERAPY | Facility: CLINIC | Age: 64
End: 2024-02-12

## 2024-02-16 ENCOUNTER — PROCEDURE VISIT (OUTPATIENT)
Dept: OBGYN CLINIC | Facility: CLINIC | Age: 64
End: 2024-02-16
Payer: COMMERCIAL

## 2024-02-16 VITALS — BODY MASS INDEX: 29.53 KG/M2 | WEIGHT: 200 LBS

## 2024-02-16 DIAGNOSIS — M25.461 BILATERAL KNEE EFFUSIONS: ICD-10-CM

## 2024-02-16 DIAGNOSIS — M25.562 CHRONIC PAIN OF BOTH KNEES: ICD-10-CM

## 2024-02-16 DIAGNOSIS — G89.29 CHRONIC PAIN OF BOTH KNEES: ICD-10-CM

## 2024-02-16 DIAGNOSIS — M17.0 BILATERAL PRIMARY OSTEOARTHRITIS OF KNEE: Primary | ICD-10-CM

## 2024-02-16 DIAGNOSIS — M25.462 BILATERAL KNEE EFFUSIONS: ICD-10-CM

## 2024-02-16 DIAGNOSIS — M25.561 CHRONIC PAIN OF BOTH KNEES: ICD-10-CM

## 2024-02-16 PROCEDURE — 20610 DRAIN/INJ JOINT/BURSA W/O US: CPT | Performed by: ORTHOPAEDIC SURGERY

## 2024-02-16 NOTE — PROGRESS NOTES
"Assessment/Plan:  1. Bilateral primary osteoarthritis of knee  Large joint arthrocentesis: bilateral knee      2. Chronic pain of both knees  Large joint arthrocentesis: bilateral knee      3. Bilateral knee effusions  Large joint arthrocentesis: bilateral knee        Scribe Attestation      I,:  Nathan Paul PA-C am acting as a scribe while in the presence of the attending physician.:       I,:  Alberto Mina, DO personally performed the services described in this documentation    as scribed in my presence.:           Figueroa is a pleasant 63-year-old presenting today for bilateral knee Synvisc 1 injections for his activity related pain due to his underlying osteoarthritis.  He consented to and underwent aspiration and injections as detailed below, which she tolerated well without difficulty or complication.  Postinjection instructions were provided.  We will plan to see him back in 6 months pending efficacy of today's procedures.  All questions addressed    Large joint arthrocentesis: bilateral knee  Universal Protocol:  Consent: Verbal consent obtained.  Risks and benefits: risks, benefits and alternatives were discussed  Consent given by: patient  Time out: Immediately prior to procedure a \"time out\" was called to verify the correct patient, procedure, equipment, support staff and site/side marked as required.  Timeout called at: 2/16/2024 11:15 AM.  Site marked: the operative site was marked  Patient identity confirmed: verbally with patient  Supporting Documentation  Indications: pain   Procedure Details  Location: knee - bilateral knee  Preparation: Patient was prepped and draped in the usual sterile fashion  Needle size: 18 G  Ultrasound guidance: no  Approach: lateral    Medications (Right): 48 mg hylan 48 MG/6MLAspirate amount (Right): 0 mL  Medications (Left): 48 mg hylan 48 MG/6ML   Aspirate amount (Left): 15 mL  Patient tolerance: patient tolerated the procedure well with no immediate " complications  Dressing:  Sterile dressing applied        Subjective: Bilateral knee Synvisc-One    Patient ID: Figueroa Chaudhry is a 63 y.o. male presenting today for the aforementioned injections.  He denies new injury    Review of Systems      Past Medical History:   Diagnosis Date    Arthritis     knees    Asthma     Cancer (HCC)     stg 1-prostate    Colon polyp     Extrinsic asthma     High arches     Plantar fasciitis     Undescended right testis        Past Surgical History:   Procedure Laterality Date    BILATERAL HIP ARTHROSCOPY      resurfaced    COLONOSCOPY      HERNIA REPAIR Bilateral     age 4yr    KNEE ARTHROSCOPY Bilateral     meniscus repair    LASIK      ORCHIECTOMY      TONSILLECTOMY      WISDOM TOOTH EXTRACTION         Family History   Problem Relation Age of Onset    Bone cancer Father     Prostate cancer Father     Cancer Father         prostate w/mets    COPD Mother         emphysema-smoker    Hypertension Mother     Mental illness Neg Hx        Social History     Occupational History    Not on file   Tobacco Use    Smoking status: Former     Current packs/day: 0.00     Average packs/day: 1 pack/day for 11.0 years (11.0 ttl pk-yrs)     Types: Cigarettes     Start date: 1971     Quit date: 1982     Years since quittin.1    Smokeless tobacco: Never    Tobacco comments:     quit 35 yrs ago   Vaping Use    Vaping status: Never Used   Substance and Sexual Activity    Alcohol use: Yes     Alcohol/week: 2.0 standard drinks of alcohol     Types: 2 Glasses of wine per week     Comment: social    Drug use: Never    Sexual activity: Yes     Partners: Female, Male     Birth control/protection: None         Current Outpatient Medications:     albuterol (PROVENTIL HFA,VENTOLIN HFA) 90 mcg/act inhaler, INHALE 2 PUFFS EVERY 6 HOURS AS NEEDED FOR WHEEZING, Disp: 18 g, Rfl: 0    ketotifen (ZADITOR) 0.025 % ophthalmic solution, ADMINISTER 1 DROP TO THE RIGHT EYE 2 TIMES A DAY. (Patient not  taking: Reported on 1/31/2024), Disp: , Rfl:     metaxalone (SKELAXIN) 800 mg tablet, Take 1 tablet (800 mg total) by mouth 3 (three) times a day for 5 days, Disp: 15 tablet, Rfl: 0    naproxen (NAPROSYN) 500 mg tablet, TAKE 1 TABLET BY MOUTH TWICE A DAY WITH MEALS, Disp: 14 tablet, Rfl: 0    predniSONE 10 mg tablet, TAKE 4 TABS X 3 DAYS, 3 TABS X 3 DAYS, 2 TABS X 3 DAYS, 1 TAB X 3 DAYS TAKE WITH FOOD (Patient not taking: Reported on 1/31/2024), Disp: , Rfl:     No Known Allergies    Objective:  There were no vitals filed for this visit.    Body mass index is 29.53 kg/m².    Ortho Exam    Physical Exam  Vitals and nursing note reviewed.           This document was created using speech voice recognition software.   Grammatical errors, random word insertions, pronoun errors, and incomplete sentences are an occasional consequence of this system due to software limitations, ambient noise, and hardware issues.   Any formal questions or concerns about content, text, or information contained within the body of this dictation should be directly addressed to the provider for clarification.

## 2024-02-23 ENCOUNTER — TELEPHONE (OUTPATIENT)
Dept: FAMILY MEDICINE CLINIC | Facility: CLINIC | Age: 64
End: 2024-02-23

## 2024-04-26 DIAGNOSIS — M62.830 SPASM OF THORACIC BACK MUSCLE: ICD-10-CM

## 2024-04-27 RX ORDER — NAPROXEN 500 MG/1
500 TABLET ORAL 2 TIMES DAILY WITH MEALS
Qty: 14 TABLET | Refills: 5 | Status: SHIPPED | OUTPATIENT
Start: 2024-04-27

## 2024-05-24 ENCOUNTER — TELEPHONE (OUTPATIENT)
Dept: FAMILY MEDICINE CLINIC | Facility: CLINIC | Age: 64
End: 2024-05-24

## 2024-05-25 ENCOUNTER — AMB VIDEO VISIT (OUTPATIENT)
Dept: OTHER | Facility: HOSPITAL | Age: 64
End: 2024-05-25
Payer: COMMERCIAL

## 2024-05-25 DIAGNOSIS — H57.89 IRRITATION OF RIGHT EYE: Primary | ICD-10-CM

## 2024-05-25 PROCEDURE — 99212 OFFICE O/P EST SF 10 MIN: CPT | Performed by: PHYSICIAN ASSISTANT

## 2024-05-25 RX ORDER — OFLOXACIN 3 MG/ML
1 SOLUTION/ DROPS OPHTHALMIC 4 TIMES DAILY
Qty: 5 ML | Refills: 0 | Status: SHIPPED | OUTPATIENT
Start: 2024-05-25 | End: 2024-06-01

## 2024-05-25 NOTE — PROGRESS NOTES
Required Documentation:  Encounter provider: Flora Flowers PA-C    Identify all parties in room with patient during virtual visit:  No one else    The patient was identified by name and date of birth. Figueroa Chaudhry was informed that this is a telemedicine visit and that the visit is being conducted through the [employer] platform. He agrees to proceed..  My office door was closed. No one else was in the room.  He acknowledged consent and understanding of privacy and security of the video platform. The patient has agreed to participate and understands they can discontinue the visit at any time.    Verification of patient location:  Patient is located at Home in the following state in which I hold an active license [place]    Patient is aware this is a billable service.     Reason for visit is No chief complaint on file.       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  His eyelid is swollen and painful.  He did flush it out and did warm compresses.  He had ointment for styes and put it on it today.      Past Medical History:   Diagnosis Date    Arthritis     knees    Asthma     Cancer (HCC)     stg 1-prostate    Colon polyp     Extrinsic asthma     High arches     Plantar fasciitis     Undescended right testis        Past Surgical History:   Procedure Laterality Date    BILATERAL HIP ARTHROSCOPY      resurfaced    COLONOSCOPY      HERNIA REPAIR Bilateral     age 4yr    KNEE ARTHROSCOPY Bilateral     meniscus repair    LASIK      ORCHIECTOMY      TONSILLECTOMY      WISDOM TOOTH EXTRACTION          No Known Allergies    Review of Systems   Constitutional: Negative.    HENT: Negative.     Eyes:  Positive for pain and redness.   Respiratory: Negative.     Cardiovascular: Negative.    Gastrointestinal: Negative.    Musculoskeletal: Negative.    Neurological: Negative.    Psychiatric/Behavioral: Negative.         Video Exam    There  were no vitals filed for this visit.    Physical Exam  Constitutional:       General: He is not in acute distress.     Appearance: Normal appearance. He is not ill-appearing, toxic-appearing or diaphoretic.   HENT:      Head: Normocephalic and atraumatic.   Eyes:      Comments: Right upper eyelid with swelling and erythema   Pulmonary:      Effort: Pulmonary effort is normal.   Skin:     General: Skin is dry.   Neurological:      General: No focal deficit present.      Mental Status: He is alert and oriented to person, place, and time.   Psychiatric:         Mood and Affect: Mood normal.         Behavior: Behavior normal.         Visit Time  Total Visit Duration: 5 minutes    Assessment/Plan:    Diagnoses and all orders for this visit:    Irritation of right eye  -     ofloxacin (OCUFLOX) 0.3 % ophthalmic solution; Administer 1 drop to both eyes 4 (four) times a day for 7 days        Patient Instructions   Warm compresses  Apply eye drops as directed  Follow up with PCP  ER if worsen

## 2024-06-03 ENCOUNTER — APPOINTMENT (OUTPATIENT)
Dept: LAB | Facility: CLINIC | Age: 64
End: 2024-06-03
Payer: COMMERCIAL

## 2024-06-03 DIAGNOSIS — Z47.1 AFTERCARE FOLLOWING JOINT REPLACEMENT SURGERY: ICD-10-CM

## 2024-06-03 DIAGNOSIS — Z96.643 PRESENCE OF ARTIFICIAL HIP JOINT, BILATERAL: ICD-10-CM

## 2024-06-03 PROCEDURE — 36415 COLL VENOUS BLD VENIPUNCTURE: CPT

## 2024-06-03 PROCEDURE — 83018 HEAVY METAL QUAN EACH NES: CPT

## 2024-06-03 PROCEDURE — 82495 ASSAY OF CHROMIUM: CPT

## 2024-06-07 LAB — CR SERPL-MCNC: 3.2 UG/L (ref 0.1–2.1)

## 2024-06-11 LAB — COBALT SERPL-MCNC: 2.8 UG/L (ref 0–0.9)

## 2024-06-19 ENCOUNTER — APPOINTMENT (OUTPATIENT)
Dept: LAB | Facility: CLINIC | Age: 64
End: 2024-06-19
Payer: COMMERCIAL

## 2024-06-19 DIAGNOSIS — Z13.1 SCREENING FOR DIABETES MELLITUS: ICD-10-CM

## 2024-06-19 DIAGNOSIS — C61 PROSTATE CANCER (HCC): ICD-10-CM

## 2024-06-19 DIAGNOSIS — Z13.29 SCREENING FOR THYROID DISORDER: ICD-10-CM

## 2024-06-19 DIAGNOSIS — Z13.6 SCREENING FOR CARDIOVASCULAR CONDITION: ICD-10-CM

## 2024-06-19 LAB
ALBUMIN SERPL BCG-MCNC: 4.3 G/DL (ref 3.5–5)
ALP SERPL-CCNC: 59 U/L (ref 34–104)
ALT SERPL W P-5'-P-CCNC: 21 U/L (ref 7–52)
ANION GAP SERPL CALCULATED.3IONS-SCNC: 9 MMOL/L (ref 4–13)
AST SERPL W P-5'-P-CCNC: 22 U/L (ref 13–39)
BASOPHILS # BLD AUTO: 0.05 THOUSANDS/ÂΜL (ref 0–0.1)
BASOPHILS NFR BLD AUTO: 1 % (ref 0–1)
BILIRUB SERPL-MCNC: 0.68 MG/DL (ref 0.2–1)
BUN SERPL-MCNC: 24 MG/DL (ref 5–25)
CALCIUM SERPL-MCNC: 9.5 MG/DL (ref 8.4–10.2)
CHLORIDE SERPL-SCNC: 106 MMOL/L (ref 96–108)
CHOLEST SERPL-MCNC: 223 MG/DL
CO2 SERPL-SCNC: 26 MMOL/L (ref 21–32)
CREAT SERPL-MCNC: 0.9 MG/DL (ref 0.6–1.3)
EOSINOPHIL # BLD AUTO: 0.16 THOUSAND/ÂΜL (ref 0–0.61)
EOSINOPHIL NFR BLD AUTO: 2 % (ref 0–6)
ERYTHROCYTE [DISTWIDTH] IN BLOOD BY AUTOMATED COUNT: 14 % (ref 11.6–15.1)
EST. AVERAGE GLUCOSE BLD GHB EST-MCNC: 117 MG/DL
GFR SERPL CREATININE-BSD FRML MDRD: 89 ML/MIN/1.73SQ M
GLUCOSE P FAST SERPL-MCNC: 90 MG/DL (ref 65–99)
HBA1C MFR BLD: 5.7 %
HCT VFR BLD AUTO: 46.9 % (ref 36.5–49.3)
HDLC SERPL-MCNC: 47 MG/DL
HGB BLD-MCNC: 15.5 G/DL (ref 12–17)
IMM GRANULOCYTES # BLD AUTO: 0.02 THOUSAND/UL (ref 0–0.2)
IMM GRANULOCYTES NFR BLD AUTO: 0 % (ref 0–2)
LDLC SERPL CALC-MCNC: 154 MG/DL (ref 0–100)
LYMPHOCYTES # BLD AUTO: 2.35 THOUSANDS/ÂΜL (ref 0.6–4.47)
LYMPHOCYTES NFR BLD AUTO: 34 % (ref 14–44)
MCH RBC QN AUTO: 29.3 PG (ref 26.8–34.3)
MCHC RBC AUTO-ENTMCNC: 33 G/DL (ref 31.4–37.4)
MCV RBC AUTO: 89 FL (ref 82–98)
MONOCYTES # BLD AUTO: 0.66 THOUSAND/ÂΜL (ref 0.17–1.22)
MONOCYTES NFR BLD AUTO: 10 % (ref 4–12)
NEUTROPHILS # BLD AUTO: 3.68 THOUSANDS/ÂΜL (ref 1.85–7.62)
NEUTS SEG NFR BLD AUTO: 53 % (ref 43–75)
NONHDLC SERPL-MCNC: 176 MG/DL
NRBC BLD AUTO-RTO: 0 /100 WBCS
PLATELET # BLD AUTO: 230 THOUSANDS/UL (ref 149–390)
PMV BLD AUTO: 11.3 FL (ref 8.9–12.7)
POTASSIUM SERPL-SCNC: 4.4 MMOL/L (ref 3.5–5.3)
PROT SERPL-MCNC: 6.5 G/DL (ref 6.4–8.4)
PSA SERPL-MCNC: 5.84 NG/ML (ref 0–4)
RBC # BLD AUTO: 5.29 MILLION/UL (ref 3.88–5.62)
SODIUM SERPL-SCNC: 141 MMOL/L (ref 135–147)
TRIGL SERPL-MCNC: 111 MG/DL
TSH SERPL DL<=0.05 MIU/L-ACNC: 1.37 UIU/ML (ref 0.45–4.5)
WBC # BLD AUTO: 6.92 THOUSAND/UL (ref 4.31–10.16)

## 2024-06-19 PROCEDURE — 84443 ASSAY THYROID STIM HORMONE: CPT

## 2024-06-19 PROCEDURE — 80053 COMPREHEN METABOLIC PANEL: CPT

## 2024-06-19 PROCEDURE — 83036 HEMOGLOBIN GLYCOSYLATED A1C: CPT

## 2024-06-19 PROCEDURE — 84153 ASSAY OF PSA TOTAL: CPT

## 2024-06-19 PROCEDURE — 80061 LIPID PANEL: CPT

## 2024-06-19 PROCEDURE — 85025 COMPLETE CBC W/AUTO DIFF WBC: CPT

## 2024-06-19 PROCEDURE — 36415 COLL VENOUS BLD VENIPUNCTURE: CPT

## 2024-07-23 ENCOUNTER — OFFICE VISIT (OUTPATIENT)
Dept: FAMILY MEDICINE CLINIC | Facility: CLINIC | Age: 64
End: 2024-07-23
Payer: COMMERCIAL

## 2024-07-23 VITALS
BODY MASS INDEX: 28.91 KG/M2 | TEMPERATURE: 98 F | HEART RATE: 52 BPM | SYSTOLIC BLOOD PRESSURE: 148 MMHG | RESPIRATION RATE: 16 BRPM | HEIGHT: 69 IN | WEIGHT: 195.2 LBS | DIASTOLIC BLOOD PRESSURE: 70 MMHG

## 2024-07-23 DIAGNOSIS — C61 PROSTATE CANCER (HCC): ICD-10-CM

## 2024-07-23 DIAGNOSIS — Z23 NEED FOR VACCINATION: ICD-10-CM

## 2024-07-23 DIAGNOSIS — Z00.00 ROUTINE ADULT HEALTH MAINTENANCE: Primary | ICD-10-CM

## 2024-07-23 DIAGNOSIS — E78.2 MIXED HYPERLIPIDEMIA: ICD-10-CM

## 2024-07-23 DIAGNOSIS — Z13.6 SCREENING FOR CARDIOVASCULAR CONDITION: ICD-10-CM

## 2024-07-23 PROCEDURE — 99396 PREV VISIT EST AGE 40-64: CPT | Performed by: NURSE PRACTITIONER

## 2024-07-23 PROCEDURE — 90750 HZV VACC RECOMBINANT IM: CPT

## 2024-07-23 PROCEDURE — 90471 IMMUNIZATION ADMIN: CPT

## 2024-07-23 NOTE — PROGRESS NOTES
Adult Annual Physical  Name: Figueroa Chaudhry      : 1960      MRN: [id number]  Encounter Provider: MANNY Chaidez  Encounter Date: 2024   Encounter department: [facility]    Assessment & Plan   1. Routine adult health maintenance  2. Screening for cardiovascular condition  -     CT coronary calcium score; Future; Expected date: 2024  3. Need for vaccination  -     Zoster Vaccine Recombinant IM  4. Mixed hyperlipidemia  Assessment & Plan:  LDL elevated, he does have a family history for heart disease.  CT coronary calcium score ordered  5. Prostate cancer (HCC)  Assessment & Plan:  Continues follow-up with [facility] every 6 months, PSA stable  Immunizations and preventive care screenings were discussed with patient today. Appropriate education was printed on patient's after visit summary.    Discussed risks and benefits of prostate cancer screening. We discussed the controversial history of PSA screening for prostate cancer in the [place] as well as the risk of over detection and over treatment of prostate cancer by way of PSA screening.  The patient understands that PSA blood testing is an imperfect way to screen for prostate cancer and that elevated PSA levels in the blood may also be caused by infection, inflammation, prostatic trauma or manipulation, urological procedures, or by benign prostatic enlargement.    The role of the digital rectal examination in prostate cancer screening was also discussed and I discussed with him that there is large interobserver variability in the findings of digital rectal examination.    Counseling:  Dental Health: discussed importance of regular tooth brushing, flossing, and dental visits.  Exercise: the importance of regular exercise/physical activity was discussed. Recommend exercise 3-5 times per week for at least 30 minutes.       Depression Screening and Follow-up Plan: Patient was screened for depression during today's  encounter. They screened negative with a PHQ-2 score of 0.        History of Present Illness     Adult Annual Physical:  Patient presents for annual physical. Here today for CPE, labs done prior.  Doing well overall.  Follows with [facility] for prostate cancer every 6 months.  Considering knee replacement in the fall.     Diet and Physical Activity:  - Diet/Nutrition: well balanced diet.  - Exercise: moderate cardiovascular exercise.    Depression Screening:  - PHQ-2 Score: 0    General Health:  - Sleep: sleeps well.  - Hearing: normal hearing right ear and normal hearing left ear.  - Vision: goes for regular eye exams.  - Dental: brushes teeth twice daily and regular dental visits.    Review of Systems   Constitutional: Negative.    Respiratory: Negative.     Cardiovascular: Negative.    Gastrointestinal: Negative.    Neurological: Negative.      Current Outpatient Medications on File Prior to Visit   Medication Sig Dispense Refill   • albuterol (PROVENTIL HFA,VENTOLIN HFA) 90 mcg/act inhaler INHALE 2 PUFFS EVERY 6 HOURS AS NEEDED FOR WHEEZING 18 g 0   • naproxen (NAPROSYN) 500 mg tablet Take 1 tablet (500 mg total) by mouth 2 (two) times a day with meals 14 tablet 5   • [DISCONTINUED] ketotifen (ZADITOR) 0.025 % ophthalmic solution ADMINISTER 1 DROP TO THE RIGHT EYE 2 TIMES A DAY. (Patient not taking: Reported on 1/31/2024)     • [DISCONTINUED] metaxalone (SKELAXIN) 800 mg tablet Take 1 tablet (800 mg total) by mouth 3 (three) times a day for 5 days (Patient not taking: Reported on 7/23/2024) 15 tablet 0   • [DISCONTINUED] predniSONE 10 mg tablet TAKE 4 TABS X 3 DAYS, 3 TABS X 3 DAYS, 2 TABS X 3 DAYS, 1 TAB X 3 DAYS TAKE WITH FOOD (Patient not taking: Reported on 1/31/2024)       No current facility-administered medications on file prior to visit.      Social History     Tobacco Use   • Smoking status: Former     Current packs/day: 0.00     Average packs/day: 1 pack/day for 11.0 years (11.0 ttl pk-yrs)     Types:  "Cigarettes     Start date: 1971     Quit date: 1982     Years since quittin.5   • Smokeless tobacco: Never   • Tobacco comments:     quit 35 yrs ago   Vaping Use   • Vaping status: Never Used   Substance and Sexual Activity   • Alcohol use: Yes     Alcohol/week: 2.0 standard drinks of alcohol     Types: 2 Glasses of wine per week     Comment: social   • Drug use: Never   • Sexual activity: Yes     Partners: Female, Male     Birth control/protection: None       Objective     /70   Pulse (!) 52   Temp 98 °F (36.7 °C)   Resp 16   Ht 5' 9\" (1.753 m)   Wt 88.5 kg (195 lb 3.2 oz)   BMI 28.83 kg/m²     Physical Exam  Vitals and nursing note reviewed.   Constitutional:       Appearance: Normal appearance. He is well-developed.   HENT:      Head: Normocephalic and atraumatic.      Right Ear: Tympanic membrane and external ear normal.      Left Ear: Tympanic membrane and external ear normal.      Nose: Nose normal.      Mouth/Throat:      Pharynx: Oropharynx is clear.   Eyes:      Extraocular Movements: Extraocular movements intact.      Conjunctiva/sclera: Conjunctivae normal.      Pupils: Pupils are equal, round, and reactive to light.   Neck:      Vascular: No carotid bruit.   Cardiovascular:      Rate and Rhythm: Normal rate and regular rhythm.      Pulses: Normal pulses.      Heart sounds: Normal heart sounds. No murmur heard.  Pulmonary:      Effort: Pulmonary effort is normal.      Breath sounds: Normal breath sounds.   Abdominal:      General: Bowel sounds are normal.      Palpations: Abdomen is soft.      Tenderness: There is no abdominal tenderness.      Hernia: No hernia is present.   Musculoskeletal:         General: No deformity. Normal range of motion.      Cervical back: Normal range of motion and neck supple.   Skin:     General: Skin is warm and dry.      Capillary Refill: Capillary refill takes less than 2 seconds.   Neurological:      Mental Status: He is alert and oriented to " person, place, and time.      Sensory: No sensory deficit.      Coordination: Coordination normal.      Deep Tendon Reflexes: Reflexes normal.   Psychiatric:         Mood and Affect: Mood normal.         Behavior: Behavior normal.

## 2024-09-09 ENCOUNTER — HOSPITAL ENCOUNTER (OUTPATIENT)
Dept: CT IMAGING | Facility: HOSPITAL | Age: 64
Discharge: HOME | End: 2024-09-09
Payer: COMMERCIAL

## 2024-09-09 DIAGNOSIS — Z13.6 SCREENING FOR CARDIOVASCULAR CONDITION: ICD-10-CM

## 2024-09-09 PROCEDURE — 75571 CT HRT W/O DYE W/CA TEST: CPT

## 2024-09-23 ENCOUNTER — OFFICE VISIT (OUTPATIENT)
Dept: FAMILY MEDICINE CLINIC | Facility: CLINIC | Age: 64
End: 2024-09-23
Payer: COMMERCIAL

## 2024-09-23 VITALS
HEART RATE: 48 BPM | HEIGHT: 69 IN | WEIGHT: 195 LBS | SYSTOLIC BLOOD PRESSURE: 124 MMHG | TEMPERATURE: 96 F | BODY MASS INDEX: 28.88 KG/M2 | RESPIRATION RATE: 20 BRPM | DIASTOLIC BLOOD PRESSURE: 70 MMHG

## 2024-09-23 DIAGNOSIS — E78.2 MIXED HYPERLIPIDEMIA: ICD-10-CM

## 2024-09-23 DIAGNOSIS — H01.001 BLEPHARITIS OF RIGHT UPPER EYELID, UNSPECIFIED TYPE: Primary | ICD-10-CM

## 2024-09-23 PROCEDURE — 99213 OFFICE O/P EST LOW 20 MIN: CPT | Performed by: NURSE PRACTITIONER

## 2024-09-23 RX ORDER — TOBRAMYCIN AND DEXAMETHASONE 3; 1 MG/ML; MG/ML
1 SUSPENSION/ DROPS OPHTHALMIC
Qty: 5 ML | Refills: 0 | Status: SHIPPED | OUTPATIENT
Start: 2024-09-23

## 2024-09-23 NOTE — ASSESSMENT & PLAN NOTE
Reviewed CT coronary calcium score.  Overall risk is decreased.  He is not interested in starting statin therapy

## 2024-09-23 NOTE — PROGRESS NOTES
"Ambulatory Visit  Name: Figueroa Chaudhry      : 1960      MRN: [id number]  Encounter Provider: MANNY Chaidez  Encounter Date: 2024   Encounter department: [facility]    Assessment & Plan  Blepharitis of right upper eyelid, unspecified type  To follow-up with ophthalmology if this does not improve within a week of eyedrops  Orders:    tobramycin-dexamethasone (TOBRADEX) ophthalmic suspension; Administer 1 drop to the right eye every 4 (four) hours while awake    Mixed hyperlipidemia  Reviewed CT coronary calcium score.  Overall risk is decreased.  He is not interested in starting statin therapy          History of Present Illness     He has had a recurring bump in his right upper eyelid for the past 6 months.  Reports it waxes and wanes, however never fully resolves.  Reports increased eye tearing.  No purulent drainage or eye redness.  Not using anything OTC          Review of Systems   Constitutional: Negative.    Eyes:         See HPI   All other systems reviewed and are negative.          Objective     /70   Pulse (!) 48   Temp (!) 96 °F (35.6 °C)   Resp 20   Ht 5' 9\" (1.753 m)   Wt 88.5 kg (195 lb)   BMI 28.80 kg/m²     Physical Exam  Vitals and nursing note reviewed.   Constitutional:       General: He is not in acute distress.     Appearance: Normal appearance.   HENT:      Head: Normocephalic and atraumatic.   Eyes:      Conjunctiva/sclera: Conjunctivae normal.      Comments: Scant debris in upper eyelashes.  Erythematous mass center of upper eyelid.  No discharge or eye redness   Neck:      Vascular: No carotid bruit.   Cardiovascular:      Rate and Rhythm: Normal rate and regular rhythm.      Pulses: Normal pulses.      Heart sounds: Normal heart sounds. No murmur heard.  Pulmonary:      Effort: Pulmonary effort is normal.      Breath sounds: Normal breath sounds.   Skin:     General: Skin is warm and dry.   Neurological:      Mental Status: He is " alert.   Psychiatric:         Mood and Affect: Mood normal.         Behavior: Behavior normal.

## 2024-09-30 ENCOUNTER — PATIENT MESSAGE (OUTPATIENT)
Dept: FAMILY MEDICINE CLINIC | Facility: CLINIC | Age: 64
End: 2024-09-30

## 2024-10-17 ENCOUNTER — OFFICE VISIT (OUTPATIENT)
Dept: OBGYN CLINIC | Facility: CLINIC | Age: 64
End: 2024-10-17
Payer: COMMERCIAL

## 2024-10-17 VITALS
DIASTOLIC BLOOD PRESSURE: 74 MMHG | WEIGHT: 195 LBS | HEART RATE: 49 BPM | SYSTOLIC BLOOD PRESSURE: 133 MMHG | BODY MASS INDEX: 28.88 KG/M2 | HEIGHT: 69 IN

## 2024-10-17 DIAGNOSIS — M25.562 CHRONIC PAIN OF BOTH KNEES: ICD-10-CM

## 2024-10-17 DIAGNOSIS — M17.0 BILATERAL PRIMARY OSTEOARTHRITIS OF KNEE: Primary | ICD-10-CM

## 2024-10-17 DIAGNOSIS — G89.29 CHRONIC PAIN OF BOTH KNEES: ICD-10-CM

## 2024-10-17 DIAGNOSIS — M25.561 CHRONIC PAIN OF BOTH KNEES: ICD-10-CM

## 2024-10-17 PROCEDURE — 20610 DRAIN/INJ JOINT/BURSA W/O US: CPT | Performed by: ORTHOPAEDIC SURGERY

## 2024-10-17 PROCEDURE — 99213 OFFICE O/P EST LOW 20 MIN: CPT | Performed by: ORTHOPAEDIC SURGERY

## 2024-10-17 RX ORDER — DOXYCYCLINE HYCLATE 100 MG
1 TABLET ORAL 2 TIMES DAILY
COMMUNITY
Start: 2024-10-14

## 2024-10-17 RX ORDER — NEOMYCIN SULFATE, POLYMYXIN B SULFATE AND DEXAMETHASONE 3.5; 10000; 1 MG/ML; [USP'U]/ML; MG/ML
1 SUSPENSION/ DROPS OPHTHALMIC 4 TIMES DAILY
COMMUNITY
Start: 2024-10-14

## 2024-10-17 RX ORDER — CEFIXIME 400 MG/1
400 CAPSULE ORAL DAILY
COMMUNITY

## 2024-10-17 RX ADMIN — TRIAMCINOLONE ACETONIDE 40 MG: 40 INJECTION, SUSPENSION INTRA-ARTICULAR; INTRAMUSCULAR at 11:30

## 2024-10-17 RX ADMIN — ROPIVACAINE HYDROCHLORIDE 4 ML: 2 INJECTION, SOLUTION EPIDURAL; INFILTRATION; PERINEURAL at 11:30

## 2024-10-17 NOTE — PATIENT INSTRUCTIONS
Red flags and risks of injection include but are not limited to infection <0.072% as referenced in some sources, nerve or artery penetration, and if steroids are used-skin dimpling <1%, hypo-pigmentation <1%. Recommended no submerging underwater in a tub, pool, ocean, lake, jacuzzi, hot tub, or any other body of water for 1 week until needle wound closes due to risk of infection. May take showers. Clean needle site with soap and water and keep covered at all times with sterile bandage such as a band-aid until fully healed. Educated if any symptoms including fevers, chills, swelling, or worsening symptoms occur then to call office or go to hospital for immediate care if physician unavailable due to possible infection or other complication which is a serious medical problem. Patient expressed understanding and agreed to proceed with procedure.

## 2024-10-17 NOTE — PROGRESS NOTES
"Ortho Sports Medicine Patient Visit     Assesment:   63 y.o. male with history of bilateral knee osteoarthritis.  Patient here with pain flare of symptoms consistent with this pathology.    Plan:    Will proceed with bilateral landmark guided corticosteroid injection of the knee as documented procedure section of this note    Patient is able to follow-up with our office as often as he needs for temporizing of his osteoarthritis pain.    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  This will afford him the maximal amount of time for recovery in between employment responsibilities as a referee.    Patient encouraged to follow-up with Dr. Mina's team for further discussion of knee replacement to which the patient states that he would do at that time.      Chief Complaint   Patient presents with    Left Knee - Pain    Right Knee - Pain       History of Present Illness:    The patient is a 63 y.o. male who presents to the office with bilateral knee pain with the past medical history notable for osteoarthritis.  Has been seen in the orthopedic clinic before this year with the last dosages of viscosupplementation administered on February 2024.  States that he normally has good response to viscosupplementation but this 1 has been less beneficial for him.  He states that his pain is back despite this initial supplementation.    He states that he has undergone \"numerous\" amounts of injections in the past and states that is most likely time for knee replacement at this point.  He understands that he would return to have conversations with his previous provider (Dr. Mina) for further discussion of strategy for knee replacement.  He is a referee for athletic competitions and states that he is shooting to have his knee replacement done during the spring 2025 as this would provide him the with the most amount of time in " between seasons that would afford him the most amount of rehabilitation time.  He wished to proceed with corticosteroid injections today (it has been greater than 3 months since his last injection) in order to afford him a little bit more time for pain control until he can get to the spring.    Patient endorses that he has been utilizing home exercise regimens as well as oral medications with no significant lasting control of his pain flares.      Knee Surgical History:  None    Past Medical, Social and Family History:  Past Medical History:   Diagnosis Date    Arthritis     knees    Asthma     Cancer (HCC)     stg 1-prostate    Colon polyp     Extrinsic asthma     High arches     Plantar fasciitis     Undescended right testis      Past Surgical History:   Procedure Laterality Date    BILATERAL HIP ARTHROSCOPY      resurfaced    COLONOSCOPY      HERNIA REPAIR Bilateral     age 4yr    KNEE ARTHROSCOPY Bilateral     meniscus repair    LASIK      ORCHIECTOMY      TONSILLECTOMY      WISDOM TOOTH EXTRACTION       No Known Allergies  Current Outpatient Medications on File Prior to Visit   Medication Sig Dispense Refill    albuterol (PROVENTIL HFA,VENTOLIN HFA) 90 mcg/act inhaler INHALE 2 PUFFS EVERY 6 HOURS AS NEEDED FOR WHEEZING 18 g 0    cefixime (SUPRAX) 400 mg Take 400 mg by mouth daily      doxycycline hyclate (VIBRA-TABS) 100 mg tablet Take 1 tablet by mouth 2 (two) times a day      neomycin-polymyxin-dexamethasone (MAXITROL) ophthalmic suspension Administer 1 drop to both eyes 4 (four) times a day      tobramycin-dexamethasone (TOBRADEX) ophthalmic suspension Administer 1 drop to the right eye every 4 (four) hours while awake 5 mL 0    naproxen (NAPROSYN) 500 mg tablet Take 1 tablet (500 mg total) by mouth 2 (two) times a day with meals (Patient not taking: Reported on 10/17/2024) 14 tablet 5     No current facility-administered medications on file prior to visit.     Social History     Socioeconomic History     Marital status: Single     Spouse name: Not on file    Number of children: Not on file    Years of education: Not on file    Highest education level: Not on file   Occupational History    Not on file   Tobacco Use    Smoking status: Former     Current packs/day: 0.00     Average packs/day: 1 pack/day for 11.0 years (11.0 ttl pk-yrs)     Types: Cigarettes     Start date: 1971     Quit date: 1982     Years since quittin.8     Passive exposure: Never    Smokeless tobacco: Never    Tobacco comments:     quit 35 yrs ago   Vaping Use    Vaping status: Never Used   Substance and Sexual Activity    Alcohol use: Yes     Alcohol/week: 2.0 standard drinks of alcohol     Types: 2 Glasses of wine per week     Comment: social    Drug use: Never    Sexual activity: Yes     Partners: Female, Male     Birth control/protection: None   Other Topics Concern    Not on file   Social History Narrative    Not on file     Social Determinants of Health     Financial Resource Strain: Not on file   Food Insecurity: Not on file   Transportation Needs: Not on file   Physical Activity: Not on file   Stress: Not on file   Social Connections: Not on file   Intimate Partner Violence: Not on file   Housing Stability: Not on file       I have reviewed the past medical, surgical, social and family history, medications and allergies as documented in the EMR.    Review of systems: ROS is negative other than that noted in the HPI.  Constitutional: Negative for fatigue and fever.   HENT: Negative for sore throat.    Respiratory: Negative for shortness of breath.    Cardiovascular: Negative for chest pain.   Gastrointestinal: Negative for abdominal pain.   Endocrine: Negative for cold intolerance and heat intolerance.   Genitourinary: Negative for flank pain.   Musculoskeletal: Negative for back pain.   Skin: Negative for rash.   Allergic/Immunologic: Negative for immunocompromised state.   Neurological: Negative for dizziness.  "  Psychiatric/Behavioral: Negative for agitation.      Physical Exam:    Blood pressure 133/74, pulse (!) 49, height 5' 9\" (1.753 m), weight 88.5 kg (195 lb).    General/Constitutional: NAD, well developed, well nourished  HENT: Normocephalic, atraumatic  CV: Intact distal pulses, regular rate  Resp: No respiratory distress or labored breathing  Lymphatic: No lymphadenopathy palpated  Neuro: Alert and Oriented x 3, no focal deficits  Psych: Normal mood, normal affect  Skin: Warm, dry, no rashes, no erythema    Bilateral KNEE:  Erythema: no  Swelling: no  Increased Warmth: no  Tenderness: Tenderness along medial joint lines.  Flexion: intact  Extension: intact  Patellar Apprehension: negative  Patellar Grind Neville's: negative  Lachman's: negative  Drawer: negative  Varus laxity: negative  Valgus laxity: negative  Keerthi: negative     Large joint arthrocentesis: bilateral knee  Universal Protocol:  Consent: Verbal consent obtained.  Risks and benefits: risks, benefits and alternatives were discussed  Consent given by: patient  Time out: Immediately prior to procedure a \"time out\" was called to verify the correct patient, procedure, equipment, support staff and site/side marked as required.  Patient understanding: patient states understanding of the procedure being performed  Patient consent: the patient's understanding of the procedure matches consent given  Site marked: the operative site was marked  Radiology Images displayed and confirmed. If images not available, report reviewed: imaging studies available  Required items: required blood products, implants, devices, and special equipment available  Patient identity confirmed: verbally with patient  Supporting Documentation  Indications: pain   Procedure Details  Location: knee - bilateral knee  Preparation: Patient was prepped and draped in the usual sterile fashion  Needle size: 22 G  Ultrasound guidance: no ([place] Guided)  Approach: " anterolateral    Medications (Right): 40 mg triamcinolone acetonide 40 mg/mL; 4 mL ropivacaine 0.2 %Medications (Left): 40 mg triamcinolone acetonide 40 mg/mL; 4 mL ropivacaine 0.2 %   Patient tolerance: patient tolerated the procedure well with no immediate complications  Dressing:  Sterile dressing applied            Knee Imaging    X-rays of the knee reviewed and interpreted today. These show no acute fractures.  Moderate to severe degenerative changes.  Bilateral patella is well centered on the trochlea.

## 2024-10-18 RX ORDER — ROPIVACAINE HYDROCHLORIDE 2 MG/ML
4 INJECTION, SOLUTION EPIDURAL; INFILTRATION; PERINEURAL
Status: COMPLETED | OUTPATIENT
Start: 2024-10-17 | End: 2024-10-17

## 2024-10-18 RX ORDER — TRIAMCINOLONE ACETONIDE 40 MG/ML
40 INJECTION, SUSPENSION INTRA-ARTICULAR; INTRAMUSCULAR
Status: COMPLETED | OUTPATIENT
Start: 2024-10-17 | End: 2024-10-17

## 2024-11-12 ENCOUNTER — TELEPHONE (OUTPATIENT)
Dept: FAMILY MEDICINE CLINIC | Facility: CLINIC | Age: 64
End: 2024-11-12

## 2024-11-12 ENCOUNTER — CLINICAL SUPPORT (OUTPATIENT)
Dept: FAMILY MEDICINE CLINIC | Facility: CLINIC | Age: 64
End: 2024-11-12
Payer: COMMERCIAL

## 2024-11-12 DIAGNOSIS — Z23 ENCOUNTER FOR IMMUNIZATION: Primary | ICD-10-CM

## 2024-11-12 PROCEDURE — 90471 IMMUNIZATION ADMIN: CPT

## 2024-11-12 PROCEDURE — 90750 HZV VACC RECOMBINANT IM: CPT

## 2024-11-25 ENCOUNTER — OFFICE VISIT (OUTPATIENT)
Dept: AUDIOLOGY | Facility: CLINIC | Age: 64
End: 2024-11-25

## 2024-11-25 ENCOUNTER — OFFICE VISIT (OUTPATIENT)
Dept: AUDIOLOGY | Facility: CLINIC | Age: 64
End: 2024-11-25
Payer: COMMERCIAL

## 2024-11-25 DIAGNOSIS — H90.3 SENSORINEURAL HEARING LOSS, BILATERAL: Primary | ICD-10-CM

## 2024-11-25 PROCEDURE — 92557 COMPREHENSIVE HEARING TEST: CPT | Performed by: AUDIOLOGIST

## 2024-11-25 PROCEDURE — 92567 TYMPANOMETRY: CPT | Performed by: AUDIOLOGIST

## 2024-11-25 NOTE — PROGRESS NOTES
Hearing Aid Evaluation  Name:  Figueroa Chaudhry  :  1960  Age:  64 y.o.  MRN:  [id number]  Date of Evaluation: 24     HISTORY:    Figueroa Chaudhry was seen today for a hearing aid evaluation following his audiometric testing performed. Figueroa reported some difficulty hearing in background noise and his family members in busy environments. 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     RESULTS REVIEW:    The audiometric findings were reviewed with the patient . All of the patient's questions regarding his hearing status were addressed, and the importance of realistic expectations of hearing loss and amplification were discussed.      DEVICE REVIEW & RECOMMENDATION:     Strengths and limitations of amplification, including various hearing aid styles, technology, options, and accessories were discussed at length with patient. Hearing aids are assistive devices and are not designed to restore normal hearing. The patient was counseled on the importance of self-advocacy, motivation, as well as effective communication strategies that can be used to optimize hearing aid success. Based on the degree of his hearing loss, preferences, and lifestyle needs, the following hearing recommendations were made:    The first hearing aid recommendation is Francesca Rtia AI 20 CIC-R.  The second hearing aid recommendation is Francesca Rita AI 20 CIC-R.    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 In addition, rechargability was decided to be more important than bluetooth capabilities. Advanced or Premium technology was recommended based on his need for background and environmental noise management.       [facility]'s office policies regarding our hearing aid program were reviewed, including the 45 day trial period, non-refundable return fees, as well as the  warranties and service plan. Hearing aid cost, and  payment, as well as insurance benefit (if applicable) were discussed with the patient.     *See attached quote sheet    DEVICE SELECTION:    At this time, patient wishes to defer the purchase of hearing aids.  Both employee discount pricing as well as contact info for [employer] was given. He will consider his options and let us know.         Garry Esposito., CCC-A  Clinical Audiologist  [facility] AUDIOLOGY  [address] [facility] [facility] [address]

## 2024-11-25 NOTE — PROGRESS NOTES
Diagnostic Hearing Evaluation    Name:  Figueroa Chaudhry  :  1960  Age:  64 y.o.   MRN:  [id number]  Date of Evaluation: 24     HISTORY:     Reason for visit: Difficulty Understanding    Figueroa Chaudhry is being seen today at the request of Dr. Parisa steele. provider found for an initial  evaluation of hearing. The patient reports some difficulty hearing in background noise and with soft talkers. The patient  denies otalgia, dizziness, tinnitus, family history of hearing loss, and notes a positive history of noise exposure.     EVALUATION:    Otoscopic Evaluation:   Right Ear: Unremarkable, canal clear   Left Ear: Unremarkable, canal clear    Tympanometry:   Right Ear: Type A; normal middle ear pressure and static compliance    Left Ear: Type A; normal middle ear pressure and static compliance     Speech Audiometry:  Speech Reception (SRT)    Right Ear: 35 dB HL    Left Ear: 40 dB HL    Word Recognition Scores (WRS):  Right Ear: excellent (100 % correct)     Left Ear: excellent (100 % correct)    Stimuli: [id number]    Pure Tone Audiometry:  Conventional pure tone audiometry from 250 - 8000 Hz was obtained with good reliability and revealed the following:     Right Ear: Mild sloping to moderate sensorineural hearing loss (SNHL)    Left Ear: Mild sloping to moderate sensorineural hearing loss (SNHL)     *see attached audiogram    IMPRESSIONS:     Mild to moderate sensorineural hearing loss bilaterally    RECOMMENDATIONS:  Annual hearing evaluation to monitor hearing threshold stability  Hearing Aid Evaluation      PATIENT EDUCATION:   The results of today's results and recommendations were reviewed with the patient and his hearing thresholds were explained at length. Treatment options, including amplification and communication strategies, were discussed as appropriate. The patient voiced understanding of his test results. Questions were addressed and the patient was encouraged to contact our department  should concerns arise.      Garry Esposito., CCC-A  Clinical Audiologist  [facility] AUDIOLOGY  [address] [facility] [address]

## 2024-12-10 ENCOUNTER — TELEMEDICINE (OUTPATIENT)
Dept: OTHER | Facility: HOSPITAL | Age: 64
End: 2024-12-10
Payer: COMMERCIAL

## 2024-12-10 DIAGNOSIS — J20.9 ACUTE BRONCHITIS, UNSPECIFIED ORGANISM: Primary | ICD-10-CM

## 2024-12-10 PROCEDURE — 99213 OFFICE O/P EST LOW 20 MIN: CPT | Performed by: PHYSICIAN ASSISTANT

## 2024-12-10 RX ORDER — METHYLPREDNISOLONE 4 MG/1
TABLET ORAL
Qty: 21 TABLET | Refills: 0 | Status: SHIPPED | OUTPATIENT
Start: 2024-12-10

## 2024-12-10 RX ORDER — BENZONATATE 200 MG/1
200 CAPSULE ORAL 3 TIMES DAILY PRN
Qty: 20 CAPSULE | Refills: 0 | Status: SHIPPED | OUTPATIENT
Start: 2024-12-10

## 2024-12-11 NOTE — PATIENT INSTRUCTIONS
"Schedule a follow-up appointment with your primary care physician for recheck in 3-5 days-especially if symptoms aren't improving. If you cannot see your PCP, you can schedule a follow up appointment at a [facility]. Go to the emergency department if you develop any new or worsening symptoms including shortness of breath, chest pain, oxygen <92%, or anything else that is concerning.    Excuses can be found in \"Letters\" section of [employer] key. Can print if opened from a web browser  [facility] phone number is [phone] if you need assistance or have further questions    [phone]  Schedule or Reschedule Outpatient Testing - Option 2  Billing - Option 3  General Info - Option 4  [employer] Help - Option 5  Comprehensive Spine Program - Option 6     "

## 2024-12-11 NOTE — PROGRESS NOTES
Virtual Regular Visit  Name: Figueroa Chaudhry      : 1960      MRN: [id number]  Encounter Provider: Shannon D Severino, PA-C  Encounter Date: 12/10/2024   Encounter department: VIRTUAL CARE       Verification of patient location:  Patient is located at Other in the following state in which I hold an active license [place] :  Assessment & Plan  Acute bronchitis, unspecified organism    Orders:    benzonatate (TESSALON) 200 MG capsule; Take 1 capsule (200 mg total) by mouth 3 (three) times a day as needed for cough    methylPREDNISolone 4 MG tablet therapy pack; Use as directed on package        Encounter provider Shannon D Severino, PA-C    The patient was identified by name and date of birth. Figueroa Chaudhry was informed that this is a telemedicine visit and that the visit is being conducted through the Epic Embedded platform. He agrees to proceed..  My office door was closed. No one else was in the room.  He acknowledged consent and understanding of privacy and security of the video platform. The patient has agreed to participate and understands they can discontinue the visit at any time.    Patient is aware this is a billable service.     History was obtained from: History obtained from: patient  History of Present Illness     Pt reports cough for >2 weeks, having chest tightness. Cough is mostly dry. CARCAMO. Grandson has bronchial pneumonia. Endorses wheezing which is improved with the inhaler. Had fevers in the beginning, but none for > 1 week. COVID testing not performed.       Review of Systems   Constitutional:  Negative for fever.   HENT:  Negative for nosebleeds.    Eyes:  Negative for redness.   Respiratory:  Positive for cough, chest tightness, shortness of breath and wheezing.    Cardiovascular:  Negative for chest pain.   Gastrointestinal:  Negative for blood in stool.   Genitourinary:  Negative for hematuria.   Musculoskeletal:  Negative for gait problem.   Skin:  Negative for rash.    Neurological:  Negative for seizures.   Psychiatric/Behavioral:  Negative for behavioral problems.        Objective   There were no vitals taken for this visit.    Physical Exam  Constitutional:       General: He is not in acute distress.     Appearance: Normal appearance. He is not ill-appearing or toxic-appearing.   HENT:      Head: Normocephalic and atraumatic.      Nose: No rhinorrhea.      Mouth/Throat:      Mouth: Mucous membranes are moist.   Eyes:      Conjunctiva/sclera: Conjunctivae normal.   Pulmonary:      Effort: Pulmonary effort is normal. No respiratory distress.      Breath sounds: No wheezing (no gross audible wheeze through computer).   Musculoskeletal:      Cervical back: Normal range of motion.   Skin:     Findings: No rash (on face or neck).   Neurological:      Mental Status: He is alert.      Cranial Nerves: No dysarthria or facial asymmetry.   Psychiatric:         Mood and Affect: Mood normal.         Behavior: Behavior normal.         Visit Time  Total Visit Duration: 9 minutes not including the time spent for establishing the audio/video connection.

## 2024-12-31 ENCOUNTER — TELEMEDICINE (OUTPATIENT)
Dept: OTHER | Facility: HOSPITAL | Age: 64
End: 2024-12-31
Payer: COMMERCIAL

## 2024-12-31 DIAGNOSIS — J01.90 ACUTE SINUSITIS, RECURRENCE NOT SPECIFIED, UNSPECIFIED LOCATION: Primary | ICD-10-CM

## 2024-12-31 PROCEDURE — 99213 OFFICE O/P EST LOW 20 MIN: CPT | Performed by: NURSE PRACTITIONER

## 2024-12-31 RX ORDER — FLUTICASONE PROPIONATE 50 MCG
1 SPRAY, SUSPENSION (ML) NASAL DAILY
Qty: 16 G | Refills: 1 | Status: SHIPPED | OUTPATIENT
Start: 2024-12-31 | End: 2025-01-14

## 2024-12-31 NOTE — PROGRESS NOTES
Virtual Regular Visit  Name: Figueroa Chaudhry      : 1960      MRN: [id number]  Encounter Provider: MANNY Butt  Encounter Date: 2024   Encounter department: VIRTUAL CARE       Verification of patient location:  Patient is located at Home in the following state in which I hold an active license [place] :  Assessment & Plan  Acute sinusitis, recurrence not specified, unspecified location    Orders:    amoxicillin-clavulanate (AUGMENTIN) 875-125 mg per tablet; Take 1 tablet by mouth every 12 (twelve) hours for 10 days    fluticasone (FLONASE) 50 mcg/act nasal spray; 1 spray into each nostril daily for 14 days        Encounter provider MANNY Butt    The patient was identified by name and date of birth. Figueroa Chaudhry was informed that this is a telemedicine visit and that the visit is being conducted through the Epic Embedded platform. He agrees to proceed..  My office door was closed. No one else was in the room.  He acknowledged consent and understanding of privacy and security of the video platform. The patient has agreed to participate and understands they can discontinue the visit at any time.    Patient is aware this is a billable service.     History was obtained from:   History of Present Illness     This is a 64 year old male here today for video visit.  He was seen on 12/10 and started on steroid and cough medication for acute bronchitis..  He states about 3 days ago he felt feverish.  She states he is now having sinus pressure and congestion.  He states he a mild rare cough now, the cough had improved. He states his son was diagnosed with pneumonia. No chest pain or sob.  He states he is eating and drinking ago.  He states his is having some blood tinged nasal discharge.   He did not test for covid.        Review of Systems   Constitutional:  Positive for fatigue and fever. Negative for activity change and chills.   HENT:  Positive for congestion, rhinorrhea, sinus  pressure and sinus pain.    Respiratory:  Positive for cough. Negative for shortness of breath and wheezing.    Cardiovascular: Negative.    Neurological: Negative.    Psychiatric/Behavioral: Negative.         Objective   There were no vitals taken for this visit.    Physical Exam  Constitutional:       General: He is not in acute distress.     Appearance: Normal appearance. He is not ill-appearing or toxic-appearing.   HENT:      Head: Normocephalic and atraumatic.      Nose: Congestion and rhinorrhea present.   Pulmonary:      Effort: Pulmonary effort is normal. No respiratory distress.   Neurological:      Mental Status: He is alert and oriented to person, place, and time.   Psychiatric:         Mood and Affect: Mood normal.         Behavior: Behavior normal.         Thought Content: Thought content normal.         Judgment: Judgment normal.         Visit Time  Total Visit Duration: 6 minutes not including the time spent for establishing the audio/video connection.

## 2024-12-31 NOTE — PATIENT INSTRUCTIONS
"Rest and drink extra fluids.  Start antibiotic.  Take probiotic.  OTC cough and cold as needed.  Flonase can also be helpful.  Nasal saline flushes or jose martin pot can help flush the sinuses.  Follow up with PCP if no improvement.  Go to ER with any worsening symptoms.     Patient Education     Sinusitis in adults   The Basics   Written by the doctors and editors at [facility]   What is sinusitis? -- Sinusitis is a condition that can cause a stuffy nose, pain in the face, and discharge or \"mucus\" from the nose.  The sinuses are hollow areas in the bones of the face (figure 1). They have a thin lining that normally makes a small amount of mucus. When this lining gets irritated or infected, it swells and makes extra mucus. This causes symptoms.  Sinusitis usually happens after a person gets sick with a cold. The germs causing the cold can infect the sinuses, too. Sometimes, other germs can be the cause of the infection. Often, a person feels like their cold is getting better. But then, they get sinusitis and begin to feel sick again.  What are the symptoms of sinusitis? -- Common symptoms of sinusitis include:   Stuffy or blocked nose   Thick white, yellow, or green discharge from the nose   Pain in the teeth   Pain or pressure in the face - This often feels worse when a person bends forward.  People with sinusitis can also have other symptoms, such as:   Fever   Cough   Trouble smelling   Ear pressure or fullness   Headache   Bad breath   Feeling tired  Most of the time, symptoms start to improve in 7 to 10 days.  Should I see a doctor or nurse? -- See your doctor or nurse if your symptoms last more than 10 days, or if your symptoms first get better but then get worse.  Rarely, sinusitis can lead to serious problems. See your doctor or nurse right away (do not wait 10 days) if you have:   Fever higher than 102°F (38.9°C)   Sudden and severe pain in the face and head   Trouble seeing, or seeing double   Trouble thinking " clearly   Swelling or redness around 1 or both eyes   Stiff neck  Is there anything I can do on my own to feel better? -- Yes. To help with your symptoms, you can:   Take an over-the-counter pain reliever to reduce the pain.   Rinse your nose and sinuses with salt water a few times a day - Ask your doctor or nurse about the best way to do this.   Drink plenty of fluids - Staying hydrated might help to thin the mucus and make it drain more easily.  Your doctor might also recommend a steroid nose spray to reduce the swelling in your nose, especially if you have allergies. Talk to your doctor if you are thinking of using a steroid spray.  How is sinusitis treated? -- Most of the time, sinusitis does not need to be treated with antibiotic medicines. This is because most sinusitis is caused by viruses, not bacteria, and antibiotics do not kill viruses. In fact, even sinusitis caused by bacteria will usually get better on its own without antibiotics.  Some people with sinusitis do need treatment with antibiotics. If your symptoms have not improved after 10 days, ask your doctor if you should take antibiotics. They might recommend that you wait 1 more week to see if your symptoms improve. But if you have symptoms such as a fever or a lot of pain, they might prescribe antibiotics. If you do get antibiotics, follow all of your doctor's instructions about taking them.  What if my symptoms do not get better? -- If your symptoms do not get better, talk with your doctor or nurse. They might order tests to figure out why you still have symptoms. These can include:   CT scan or other imaging tests - Imaging tests create pictures of the inside of the body.   A test to look inside the sinuses - For this test, a doctor puts a thin tube with a camera on the end into the nose and up into the sinuses.  Some people get a lot of sinus infections or have symptoms that last at least 3 months. These people can have a different type of  "sinusitis called \"chronic sinusitis.\" Chronic sinusitis can be caused by different things. For example, some people have growths inside their nose or sinuses that are called \"polyps.\" Other people have allergies that cause their symptoms.  Chronic sinusitis can be treated in different ways. If you have chronic sinusitis, talk with your doctor about which treatments are right for you.  All topics are updated as new evidence becomes available and our peer review process is complete.  This topic retrieved from [employer] on: Feb 28, 2024.  Topic 68637 Version 21.0  Release: 32.2.4 - C32.58  © 2024 UpToDate, Inc. and/or its affiliates. All rights reserved.  figure 1: Sinuses of the face     The sinuses are hollow areas in the bones of the face. This drawing shows where the sinuses are, from the side and front views. There are 4 pairs of sinuses, named for the bones around them: sphenoid, frontal, ethmoid, and maxillary.  Graphic 100421 Version 3.0  Consumer Information Use and Disclaimer   Disclaimer: This generalized information is a limited summary of diagnosis, treatment, and/or medication information. It is not meant to be comprehensive and should be used as a tool to help the user understand and/or assess potential diagnostic and treatment options. It does NOT include all information about conditions, treatments, medications, side effects, or risks that may apply to a specific patient. It is not intended to be medical advice or a substitute for the medical advice, diagnosis, or treatment of a health care provider based on the health care provider's examination and assessment of a patient's specific and unique circumstances. Patients must speak with a health care provider for complete information about their health, medical questions, and treatment options, including any risks or benefits regarding use of medications. This information does not endorse any treatments or medications as safe, effective, or approved for " treating a specific patient. UpToDate, Inc. and its affiliates disclaim any warranty or liability relating to this information or the use thereof.The use of this information is governed by the Terms of Use, available at https://www.w[employer]Mind-NRGuwer.com/en/know/clinical-effectiveness-terms. 2024© UpToDate, Inc. and its affiliates and/or licensors. All rights reserved.  Copyright   © 2024 UpToDate, Inc. and/or its affiliates. All rights reserved.

## 2025-01-29 ENCOUNTER — TELEMEDICINE (OUTPATIENT)
Dept: OTHER | Facility: HOSPITAL | Age: 65
End: 2025-01-29
Payer: COMMERCIAL

## 2025-01-29 DIAGNOSIS — H00.012 HORDEOLUM EXTERNUM OF RIGHT LOWER EYELID: Primary | ICD-10-CM

## 2025-01-29 PROCEDURE — 99213 OFFICE O/P EST LOW 20 MIN: CPT | Performed by: PHYSICIAN ASSISTANT

## 2025-01-29 RX ORDER — ERYTHROMYCIN 5 MG/G
0.5 OINTMENT OPHTHALMIC
Qty: 1 G | Refills: 0 | Status: SHIPPED | OUTPATIENT
Start: 2025-01-29 | End: 2025-02-05

## 2025-01-29 NOTE — PATIENT INSTRUCTIONS
Use antibiotics eye ointment as directed  Warm compresses for discharge  Practice hand hygiene regularly  Wash linens and pillow case x 24 hrs  Follow up with PCP  ER if worsen

## 2025-01-29 NOTE — PROGRESS NOTES
Virtual Regular Visit  Name: Figueroa Chaudhry      : 1960      MRN: [id number]  Encounter Provider: Flora Flowers PA-C  Encounter Date: 2025   Encounter department: VIRTUAL CARE       Verification of patient location:  Patient is located at Home in the following state in which I hold an active license [place] :  Assessment & Plan  Hordeolum externum of right lower eyelid    Orders:    erythromycin (ILOTYCIN) ophthalmic ointment; Administer 0.5 inches to the right eye daily at bedtime for 7 days        Encounter provider Flora Flowers PA-C    The patient was identified by name and date of birth. Figueroa Chaudhry was informed that this is a telemedicine visit and that the visit is being conducted through the Epic Embedded platform. He agrees to proceed..  My office door was closed. No one else was in the room.  He acknowledged consent and understanding of privacy and security of the video platform. The patient has agreed to participate and understands they can discontinue the visit at any time.    Patient is aware this is a billable service.     History of Present Illness     Patient states that he has a stye in his eye. He has had issues with his eye for 6 months and seen mulle doctors.  Now he has a stye on the bottom that is irritating.  The bottom just started 3 days ago and it very painful.  He has seen 2 eye doctors for his upper eyelid and is seeing a new eye doctor Friday.      Review of Systems   Constitutional: Negative.    HENT: Negative.     Eyes:  Positive for pain and itching. Negative for redness.   Respiratory: Negative.     Cardiovascular: Negative.    Musculoskeletal: Negative.    Neurological: Negative.    Psychiatric/Behavioral: Negative.         Objective   There were no vitals taken for this visit.    Physical Exam  Constitutional:       General: He is not in acute distress.     Appearance: Normal appearance. He is not ill-appearing, toxic-appearing or diaphoretic.    HENT:      Head: Normocephalic and atraumatic.   Eyes:      Extraocular Movements: Extraocular movements intact.      Conjunctiva/sclera: Conjunctivae normal.      Comments: Right eye lid with swelling and erythema on top and bottom lid   Pulmonary:      Effort: Pulmonary effort is normal.   Skin:     General: Skin is dry.   Neurological:      General: No focal deficit present.      Mental Status: He is alert and oriented to person, place, and time.   Psychiatric:         Mood and Affect: Mood normal.         Behavior: Behavior normal.         Visit Time  Total Visit Duration: 5

## 2025-04-01 ENCOUNTER — OFFICE VISIT (OUTPATIENT)
Dept: OBGYN CLINIC | Facility: CLINIC | Age: 65
End: 2025-04-01
Payer: COMMERCIAL

## 2025-04-01 ENCOUNTER — APPOINTMENT (OUTPATIENT)
Dept: RADIOLOGY | Facility: CLINIC | Age: 65
End: 2025-04-01
Payer: COMMERCIAL

## 2025-04-01 VITALS — BODY MASS INDEX: 28.88 KG/M2 | HEIGHT: 69 IN | WEIGHT: 195 LBS

## 2025-04-01 DIAGNOSIS — M25.511 RIGHT SHOULDER PAIN, UNSPECIFIED CHRONICITY: Primary | ICD-10-CM

## 2025-04-01 DIAGNOSIS — M25.511 RIGHT SHOULDER PAIN, UNSPECIFIED CHRONICITY: ICD-10-CM

## 2025-04-01 DIAGNOSIS — M19.011 OSTEOARTHRITIS OF GLENOHUMERAL JOINT, RIGHT: ICD-10-CM

## 2025-04-01 PROCEDURE — 99214 OFFICE O/P EST MOD 30 MIN: CPT | Performed by: ORTHOPAEDIC SURGERY

## 2025-04-01 PROCEDURE — 73030 X-RAY EXAM OF SHOULDER: CPT

## 2025-04-01 NOTE — PROGRESS NOTES
Patient Name: Figueroa Chaudhry      : 1960       MRN: [id number]   Encounter Provider: Amol Gregory MD   Encounter Date: 25  Encounter department: [facility]         Assessment & Plan  Right shoulder pain, unspecified chronicity    Orders:  •  XR shoulder 2+ vw right; Future    Osteoarthritis of glenohumeral joint, right    Orders:  •  MRI shoulder right wo contrast; Future  •  CT shoulder right blue print; Future       Plan:  Figueroa is a 64-year-old male reporting today with a complaint of chronic right shoulder pain with a known history of osteoarthritis.  New x-rays were obtained today which shows evidence of severe glenohumeral and acromioclavicular joint osteoarthritis.  We discussed operative versus nonoperative treatments moving forward.  Operative intervention would be in the form of shoulder replacement.  Dependent upon the integrity of the rotator cuff this would either be an anatomic total shoulder arthroplasty versus a reverse total shoulder arthroplasty.  A new surgical option would be in the form of a pyrocarbon shoulder replacement. We discussed the risks and benefits, recovery and limitations following surgery.  Nonoperative treatment would be a return to physical therapy, steroid injections, over-the-counter medication and activity modification.    Figueroa states he plans on retiring in the next 1 to 2 years and would like to further investigate a shoulder replacement.  Prior to surgery more data is needed.  I would like him to have an MRI of the right shoulder questioning the integrity of the rotator cuff.  I also would like him to obtain a CT blue print of the right shoulder for preoperative planning purposes.  I will see him back once those studies have been completed.    Follow-up:  No follow-ups on file.     _____________________________________________________  CHIEF COMPLAINT:  Chief Complaint   Patient presents with   • Right  Shoulder - Pain         SUBJECTIVE:  Figueroa Chaudhry is a 64 y.o. male who presents for evaluation of his right shoulder.  Figueroa states he has a known history of right shoulder osteoarthritis.  He has received treatment for this previously with physical therapy and steroid injections approximately 2 years ago.  He is unsure if he found much relief with those treatments.  He states he is frustrated with his fairly persistent right shoulder pain and limited motion.  The pain is located in the posterior aspect of the shoulder and can radiate laterally.  He complains of right shoulder stiffness.  He does remain active.  He is an official and works in athletics daily.  He also remains active splitting [place] almost on a daily basis.  He denies distal paresthesias.  He will take ibuprofen for both knee and shoulder pain.  He states his right shoulder hurts worse than his knees at this point.    PAST MEDICAL HISTORY:  Past Medical History:   Diagnosis Date   • Arthritis     knees   • Asthma    • Cancer (HCC)     stg 1-prostate   • Colon polyp    • Extrinsic asthma    • High arches    • Plantar fasciitis    • Undescended right testis        PAST SURGICAL HISTORY:  Past Surgical History:   Procedure Laterality Date   • BILATERAL HIP ARTHROSCOPY      resurfaced   • COLONOSCOPY     • HERNIA REPAIR Bilateral     age 4yr   • KNEE ARTHROSCOPY Bilateral     meniscus repair   • LASIK     • ORCHIECTOMY     • TONSILLECTOMY     • WISDOM TOOTH EXTRACTION         FAMILY HISTORY:  Family History   Problem Relation Age of Onset   • Bone cancer Father    • Prostate cancer Father    • Cancer Father         prostate w/mets   • COPD Mother         emphysema-smoker   • Hypertension Mother    • Mental illness Neg Hx        SOCIAL HISTORY:  Social History     Tobacco Use   • Smoking status: Former     Current packs/day: 0.00     Average packs/day: 1 pack/day for 11.0 years (11.0 ttl pk-yrs)     Types: Cigarettes     Start date: 1/1/1971  "    Quit date: 1982     Years since quittin.2     Passive exposure: Never   • Smokeless tobacco: Never   • Tobacco comments:     quit 35 yrs ago   Vaping Use   • Vaping status: Never Used   Substance Use Topics   • Alcohol use: Yes     Alcohol/week: 2.0 standard drinks of alcohol     Types: 2 Glasses of wine per week     Comment: social   • Drug use: Never       MEDICATIONS:    Current Outpatient Medications:   •  albuterol (PROVENTIL HFA,VENTOLIN HFA) 90 mcg/act inhaler, INHALE 2 PUFFS EVERY 6 HOURS AS NEEDED FOR WHEEZING, Disp: 18 g, Rfl: 0  •  naproxen (Naprosyn) 500 mg tablet, Take 1 tablet (500 mg total) by mouth 2 (two) times a day with meals, Disp: 60 tablet, Rfl: 1    ALLERGIES:  No Known Allergies    LABS:  HgA1c:   Lab Results   Component Value Date    HGBA1C 5.7 (H) 2024     BMP:   Lab Results   Component Value Date    CALCIUM 9.5 2024    K 4.4 2024    CO2 26 2024     2024    BUN 24 2024    CREATININE 0.90 2024     CBC: No components found for: \"CBC\"    _____________________________________________________  Review of Systems   Constitutional:  Negative for chills, fever and unexpected weight change.   HENT:  Negative for hearing loss, nosebleeds and sore throat.    Eyes:  Negative for pain, redness and visual disturbance.   Respiratory:  Negative for cough, shortness of breath and wheezing.    Cardiovascular:  Negative for chest pain, palpitations and leg swelling.   Gastrointestinal:  Negative for abdominal pain, nausea and vomiting.   Endocrine: Negative for polyphagia and polyuria.   Genitourinary:  Negative for dysuria and hematuria.   Musculoskeletal:         See HPI   Skin:  Negative for rash and wound.   Neurological:  Negative for dizziness, numbness and headaches.   Psychiatric/Behavioral:  Negative for decreased concentration and suicidal ideas. The patient is not nervous/anxious.         Right Shoulder Exam     Range of Motion   Active " abduction:  140   Right shoulder external rotation: 3.   Forward flexion:  150   Right shoulder internal rotation 0 degrees: Patient states he cannot perform.     Muscle Strength   Abduction: 5/5   Internal rotation: 5/5   External rotation: 5/5   Supraspinatus: 5/5   Subscapularis: 5/5     Tests   Impingement: positive    Other   Sensation: normal  Pulse: present (2+ radial)    Comments:  Crepitus with range of motion      Left Shoulder Exam     Range of Motion   Active abduction:  160   External rotation:  30 (30)   Forward flexion:  170   Internal rotation 0 degrees:  Lumbar     Muscle Strength   Abduction: 5/5   Internal rotation: 5/5   External rotation: 5/5   Supraspinatus: 5/5   Subscapularis: 5/5     Other   Sensation: normal  Pulse: present              Physical Exam  Vitals reviewed.   Constitutional:       Appearance: He is well-developed.   HENT:      Head: Normocephalic and atraumatic.   Eyes:      General:         Right eye: No discharge.         Left eye: No discharge.      Conjunctiva/sclera: Conjunctivae normal.   Cardiovascular:      Rate and Rhythm: Regular rhythm.   Pulmonary:      Effort: Pulmonary effort is normal. No respiratory distress.   Musculoskeletal:      Cervical back: Normal range of motion and neck supple.      Comments: As noted in the HPI   Skin:     General: Skin is warm and dry.   Neurological:      Mental Status: He is alert and oriented to person, place, and time.   Psychiatric:         Behavior: Behavior normal.        _____________________________________________________  STUDIES REVIEWED:  I personally reviewed the pertinent images and my independent interpretation is as follows:  X-rays of the right shoulder taken today show evidence of severe glenohumeral osteoarthritis with a large goats beard osteophyte and subacromial spurring.  There is evidence of severe acromioclavicular osteoarthritis.    PROCEDURES PERFORMED:  No Procedures performed today    Scribe Attestation     I,:  Gomez Arredondo am acting as a scribe while in the presence of the attending physician.:       I,:  Amol Gregory MD personally performed the services described in this documentation    as scribed in my presence.:

## 2025-04-07 ENCOUNTER — HOSPITAL ENCOUNTER (OUTPATIENT)
Dept: RADIOLOGY | Facility: HOSPITAL | Age: 65
Discharge: HOME | End: 2025-04-07
Attending: ORTHOPAEDIC SURGERY
Payer: COMMERCIAL

## 2025-04-07 DIAGNOSIS — M19.011 OSTEOARTHRITIS OF GLENOHUMERAL JOINT, RIGHT: ICD-10-CM

## 2025-04-07 PROCEDURE — 73221 MRI JOINT UPR EXTREM W/O DYE: CPT

## 2025-04-08 ENCOUNTER — HOSPITAL ENCOUNTER (OUTPATIENT)
Dept: RADIOLOGY | Facility: HOSPITAL | Age: 65
Discharge: HOME | End: 2025-04-08
Attending: ORTHOPAEDIC SURGERY
Payer: COMMERCIAL

## 2025-04-08 DIAGNOSIS — M19.011 OSTEOARTHRITIS OF GLENOHUMERAL JOINT, RIGHT: ICD-10-CM

## 2025-04-08 PROCEDURE — 73200 CT UPPER EXTREMITY W/O DYE: CPT

## 2025-04-09 DIAGNOSIS — M17.10 PRIMARY OSTEOARTHRITIS OF KNEE, UNSPECIFIED LATERALITY: ICD-10-CM

## 2025-04-09 RX ORDER — NAPROXEN 500 MG/1
500 TABLET ORAL 2 TIMES DAILY WITH MEALS
Qty: 60 TABLET | Refills: 1 | Status: SHIPPED | OUTPATIENT
Start: 2025-04-09

## 2025-04-22 ENCOUNTER — OFFICE VISIT (OUTPATIENT)
Dept: OBGYN CLINIC | Facility: CLINIC | Age: 65
End: 2025-04-22
Payer: COMMERCIAL

## 2025-04-22 ENCOUNTER — PREP FOR PROCEDURE (OUTPATIENT)
Dept: OBGYN CLINIC | Facility: CLINIC | Age: 65
End: 2025-04-22

## 2025-04-22 VITALS — WEIGHT: 195 LBS | HEIGHT: 69 IN | BODY MASS INDEX: 28.88 KG/M2

## 2025-04-22 DIAGNOSIS — M19.011 OSTEOARTHRITIS OF GLENOHUMERAL JOINT, RIGHT: Primary | ICD-10-CM

## 2025-04-22 PROCEDURE — 99214 OFFICE O/P EST MOD 30 MIN: CPT | Performed by: ORTHOPAEDIC SURGERY

## 2025-04-22 RX ORDER — CHLORHEXIDINE GLUCONATE ORAL RINSE 1.2 MG/ML
15 SOLUTION DENTAL ONCE
OUTPATIENT
Start: 2025-04-22 | End: 2025-04-22

## 2025-04-22 NOTE — PROGRESS NOTES
Patient Name: Figueroa Chaudhry      : 1960       MRN: [id number]   Encounter Provider: Amol Gregory MD   Encounter Date: 25  Encounter department: [facility]         Assessment & Plan  Osteoarthritis of glenohumeral joint, right    Orders:  •  Comfort Arm Sling  •  Case request operating room: ARTHROPLASTY SHOULDER, Biceps Tenodesis; Standing  •  Comprehensive metabolic panel; Future  •  CBC and Platelet; Future  •  APTT; Future  •  Protime-INR; Future  •  HEMOGLOBIN A1C W/ EAG ESTIMATION; Future  •  MRSA culture; Future  •  EKG 12 lead; Future  •  Arc 2.0       Plan:  Figueroa and I reviewed his MRI together.  He does have evidence severe glenohumeral osteoarthritis with an intact rotator cuff.  There has been some degeneration at the glenoid with a retroversion angle of about 13 to 15 degrees.  I do not feel he would be a good candidate for pyrocarbon shoulder replacement as this may lead to further wear of his glenoid given the retroversion.. In my opinion he would benefit most from an anatomic total shoulder arthroplasty.  A reverse total shoulder arthroplasty may have limitations in motion versus the anatomic total shoulder arthroplasty.  I provided detailed information of the anatomic total shoulder arthroplasty and limitations following surgery.  We also discussed recovery.  He will be in a sling following surgery and will require extensive amount of physical therapy.  Typically full recovery will take 4 to 6 months.    Given that the patient has failed conservative treatment and continues to have severe pain and/or dysfunction that limits their activities of daily living, they would like to proceed with operative intervention.  We discussed risks, benefits and alternatives to surgery today in the clinic. We discussed with the patient the risks of no treatment, non-operative treatment, and operative treatment. The risks of operative intervention  were discussed and include but are not limited to: Infection, bleeding, stiffness, loss of range of motion, blood clot, failure of surgery, fracture, delayed union, nonunion, malunion, risk of potential future arthritis, continued problems with swelling, injury to surrounding structures/nerve/artery/vein, recurrence of cysts, failure of hardware, retained hardware and/or foreign body, symptomatic hardware, and continued instability, pain, dysfunction, or disability despite repair.     Specifically, for     Shoulder Arthroplasty: Infection needing multiple staged surgery for treatment, Revision surgery, failure of rotator cuff repair, loss of motion and strength, activity limitations, nerve palsies, acromial stress fractures, coracoid fractures, glenoid/humeral fractures, periprosthetic fractures       Prior to surgery he will require clearance from his primary care physician.  He was fitted for sling today and asked to bring it the day of surgery.  He will sit with our surgical scheduler today to arrange a mutually convenient date for surgery.    Follow-up:  No follow-ups on file.     _____________________________________________________  CHIEF COMPLAINT:  Chief Complaint   Patient presents with   • Right Shoulder - Follow-up         SUBJECTIVE:  Figueroa Chaudhry is a 64 y.o. male who presents for evaluation of his right shoulder.  Figueroa has known history of severe right shoulder osteoarthritis and at last visit we discussed nonoperative versus operative treatment.  For preoperative planning purposes he did obtain an MRI of the right shoulder questioning integrity of the rotator cuff and the blueprint CT scan.  He has had those studies completed and we will discuss how to move forward.    PAST MEDICAL HISTORY:  Past Medical History:   Diagnosis Date   • Arthritis     knees   • Asthma    • Cancer (HCC)     stg 1-prostate   • Colon polyp    • Extrinsic asthma    • High arches    • Plantar fasciitis    •  "Undescended right testis        PAST SURGICAL HISTORY:  Past Surgical History:   Procedure Laterality Date   • BILATERAL HIP ARTHROSCOPY      resurfaced   • COLONOSCOPY     • HERNIA REPAIR Bilateral     age 4yr   • KNEE ARTHROSCOPY Bilateral     meniscus repair   • LASIK     • ORCHIECTOMY     • TONSILLECTOMY     • WISDOM TOOTH EXTRACTION         FAMILY HISTORY:  Family History   Problem Relation Age of Onset   • Bone cancer Father    • Prostate cancer Father    • Cancer Father         prostate w/mets   • COPD Mother         emphysema-smoker   • Hypertension Mother    • Mental illness Neg Hx        SOCIAL HISTORY:  Social History     Tobacco Use   • Smoking status: Former     Current packs/day: 0.00     Average packs/day: 1 pack/day for 11.0 years (11.0 ttl pk-yrs)     Types: Cigarettes     Start date: 1971     Quit date: 1982     Years since quittin.3     Passive exposure: Never   • Smokeless tobacco: Never   • Tobacco comments:     quit 35 yrs ago   Vaping Use   • Vaping status: Never Used   Substance Use Topics   • Alcohol use: Yes     Alcohol/week: 2.0 standard drinks of alcohol     Types: 2 Glasses of wine per week     Comment: social   • Drug use: Never       MEDICATIONS:    Current Outpatient Medications:   •  albuterol (PROVENTIL HFA,VENTOLIN HFA) 90 mcg/act inhaler, INHALE 2 PUFFS EVERY 6 HOURS AS NEEDED FOR WHEEZING, Disp: 18 g, Rfl: 0  •  naproxen (NAPROSYN) 500 mg tablet, TAKE 1 TABLET BY MOUTH TWICE A DAY WITH MEALS, Disp: 60 tablet, Rfl: 1    ALLERGIES:  No Known Allergies    LABS:  HgA1c:   Lab Results   Component Value Date    HGBA1C 5.7 (H) 2024     BMP:   Lab Results   Component Value Date    CALCIUM 9.5 2024    K 4.4 2024    CO2 26 2024     2024    BUN 24 2024    CREATININE 0.90 2024     CBC: No components found for: \"CBC\"    _____________________________________________________  Review of Systems     Ortho Exam   Right Shoulder Exam    "   Range of Motion   Active abduction:  140   Right shoulder external rotation: 3.   Forward flexion:  150   Right shoulder internal rotation 0 degrees: Patient states he cannot perform.      Muscle Strength   Abduction: 5/5   Internal rotation: 5/5   External rotation: 5/5   Supraspinatus: 5/5   Subscapularis: 5/5      Tests   Impingement: positive     Other   Sensation: normal  Pulse: present (2+ radial)     Comments:  Crepitus with range of motion  Physical Exam  Vitals reviewed.   Constitutional:       Appearance: He is well-developed.   HENT:      Head: Normocephalic and atraumatic.   Eyes:      General:         Right eye: No discharge.         Left eye: No discharge.      Conjunctiva/sclera: Conjunctivae normal.   Cardiovascular:      Rate and Rhythm: Regular rhythm.   Pulmonary:      Effort: Pulmonary effort is normal. No respiratory distress.   Musculoskeletal:      Cervical back: Normal range of motion and neck supple.      Comments: As noted in the HPI   Skin:     General: Skin is warm and dry.   Neurological:      Mental Status: He is alert and oriented to person, place, and time.   Psychiatric:         Behavior: Behavior normal.        _____________________________________________________  STUDIES REVIEWED:  I personally reviewed the pertinent images and my independent interpretation is as follows:  MRI shows intact rotator cuff with tendinosis present.  CT scan of the glenohumeral joint shows severe glenohumeral arthritis with a B2 glenoid and about 13 to 15 degrees of retroversion.    PROCEDURES PERFORMED:  No Procedures performed today    Scribe Attestation    I,:  Gomez Arredondo am acting as a scribe while in the presence of the attending physician.:       I,:  Amol Gregory MD personally performed the services described in this documentation    as scribed in my presence.:

## 2025-04-23 NOTE — ASSESSMENT & PLAN NOTE
Orders:  •  Comfort Arm Sling  •  Case request operating room: ARTHROPLASTY SHOULDER, Biceps Tenodesis; Standing  •  Comprehensive metabolic panel; Future  •  CBC and Platelet; Future  •  APTT; Future  •  Protime-INR; Future  •  HEMOGLOBIN A1C W/ EAG ESTIMATION; Future  •  MRSA culture; Future  •  EKG 12 lead; Future  •  Arc 2.0

## 2025-05-12 ENCOUNTER — OFFICE VISIT (OUTPATIENT)
Dept: FAMILY MEDICINE CLINIC | Facility: CLINIC | Age: 65
End: 2025-05-12
Payer: COMMERCIAL

## 2025-05-12 VITALS
SYSTOLIC BLOOD PRESSURE: 124 MMHG | WEIGHT: 203 LBS | HEIGHT: 69 IN | BODY MASS INDEX: 30.07 KG/M2 | HEART RATE: 52 BPM | DIASTOLIC BLOOD PRESSURE: 80 MMHG | TEMPERATURE: 97 F

## 2025-05-12 DIAGNOSIS — H00.011 HORDEOLUM EXTERNUM OF RIGHT UPPER EYELID: Primary | ICD-10-CM

## 2025-05-12 PROCEDURE — 99213 OFFICE O/P EST LOW 20 MIN: CPT | Performed by: NURSE PRACTITIONER

## 2025-05-12 RX ORDER — SULFAMETHOXAZOLE AND TRIMETHOPRIM 800; 160 MG/1; MG/1
1 TABLET ORAL EVERY 12 HOURS SCHEDULED
Qty: 14 TABLET | Refills: 0 | Status: SHIPPED | OUTPATIENT
Start: 2025-05-12 | End: 2025-05-19

## 2025-05-12 NOTE — PROGRESS NOTES
"Name: Figueroa Chaudhry      : 1960      MRN: [id number]  Encounter Provider: MANNY Chaidez  Encounter Date: 2025   Encounter department: [facility]  :  Assessment & Plan  Hordeolum externum of right upper eyelid  Will cover with Bactrim, cont warm compresses.  Plans to schedule with ophtho in [place] if this continues,  Orders:  •  sulfamethoxazole-trimethoprim (BACTRIM DS) 800-160 mg per tablet; Take 1 tablet by mouth every 12 (twelve) hours for 7 days           History of Present Illness   He has been experiencing recurrent styes for the past year.  Has been seen various times for this, saw ophtho, and they keep coming back.  Currently has one on his right and left upper eyelid.  Washing his face frequently and doing warm compresses       Review of Systems   Constitutional: Negative.    HENT: Negative.     Eyes:         See HPI   All other systems reviewed and are negative.      Objective   /80   Pulse (!) 52   Temp (!) 97 °F (36.1 °C)   Ht 5' 9\" (1.753 m)   Wt 92.1 kg (203 lb)   BMI 29.98 kg/m²      Physical Exam  Vitals and nursing note reviewed.   Constitutional:       General: He is not in acute distress.     Appearance: Normal appearance. He is well-developed. He is not diaphoretic.   Eyes:      Conjunctiva/sclera: Conjunctivae normal.      Comments: External stye on both upper eyelids.  Right pointed with pinpoint pustule    Pulmonary:      Effort: Pulmonary effort is normal. No respiratory distress.   Neurological:      Mental Status: He is alert.   Psychiatric:         Mood and Affect: Mood normal.         Behavior: Behavior normal.         "

## 2025-06-02 DIAGNOSIS — H00.011 HORDEOLUM EXTERNUM OF RIGHT UPPER EYELID: ICD-10-CM

## 2025-06-02 RX ORDER — SULFAMETHOXAZOLE AND TRIMETHOPRIM 800; 160 MG/1; MG/1
1 TABLET ORAL EVERY 12 HOURS SCHEDULED
Qty: 14 TABLET | Refills: 0 | Status: SHIPPED | OUTPATIENT
Start: 2025-06-02 | End: 2025-06-09

## 2025-06-13 ENCOUNTER — APPOINTMENT (OUTPATIENT)
Dept: LAB | Facility: HOSPITAL | Age: 65
End: 2025-06-13
Payer: COMMERCIAL

## 2025-06-13 ENCOUNTER — OFFICE VISIT (OUTPATIENT)
Dept: LAB | Facility: HOSPITAL | Age: 65
End: 2025-06-13
Attending: ORTHOPAEDIC SURGERY
Payer: COMMERCIAL

## 2025-06-13 DIAGNOSIS — M19.011 OSTEOARTHRITIS OF GLENOHUMERAL JOINT, RIGHT: ICD-10-CM

## 2025-06-13 LAB
ALBUMIN SERPL BCG-MCNC: 4.2 G/DL (ref 3.5–5)
ALP SERPL-CCNC: 49 U/L (ref 34–104)
ALT SERPL W P-5'-P-CCNC: 18 U/L (ref 7–52)
ANION GAP SERPL CALCULATED.3IONS-SCNC: 10 MMOL/L (ref 4–13)
APTT PPP: 27 SECONDS (ref 23–34)
AST SERPL W P-5'-P-CCNC: 19 U/L (ref 13–39)
BILIRUB SERPL-MCNC: 0.81 MG/DL (ref 0.2–1)
BUN SERPL-MCNC: 16 MG/DL (ref 5–25)
CALCIUM SERPL-MCNC: 9.2 MG/DL (ref 8.4–10.2)
CHLORIDE SERPL-SCNC: 106 MMOL/L (ref 96–108)
CO2 SERPL-SCNC: 22 MMOL/L (ref 21–32)
CREAT SERPL-MCNC: 0.96 MG/DL (ref 0.6–1.3)
ERYTHROCYTE [DISTWIDTH] IN BLOOD BY AUTOMATED COUNT: 14 % (ref 11.6–15.1)
GFR SERPL CREATININE-BSD FRML MDRD: 82 ML/MIN/1.73SQ M
GLUCOSE P FAST SERPL-MCNC: 92 MG/DL (ref 65–99)
HCT VFR BLD AUTO: 47 % (ref 36.5–49.3)
HGB BLD-MCNC: 15.2 G/DL (ref 12–17)
INR PPP: 0.97 (ref 0.85–1.19)
MCH RBC QN AUTO: 28.5 PG (ref 26.8–34.3)
MCHC RBC AUTO-ENTMCNC: 32.3 G/DL (ref 31.4–37.4)
MCV RBC AUTO: 88 FL (ref 82–98)
PLATELET # BLD AUTO: 237 THOUSANDS/UL (ref 149–390)
PMV BLD AUTO: 10 FL (ref 8.9–12.7)
POTASSIUM SERPL-SCNC: 4.2 MMOL/L (ref 3.5–5.3)
PROT SERPL-MCNC: 6.6 G/DL (ref 6.4–8.4)
PROTHROMBIN TIME: 13.4 SECONDS (ref 12.3–15)
RBC # BLD AUTO: 5.33 MILLION/UL (ref 3.88–5.62)
SODIUM SERPL-SCNC: 138 MMOL/L (ref 135–147)
WBC # BLD AUTO: 6.67 THOUSAND/UL (ref 4.31–10.16)

## 2025-06-13 PROCEDURE — 36415 COLL VENOUS BLD VENIPUNCTURE: CPT

## 2025-06-13 PROCEDURE — 93005 ELECTROCARDIOGRAM TRACING: CPT

## 2025-06-13 PROCEDURE — 85027 COMPLETE CBC AUTOMATED: CPT

## 2025-06-13 PROCEDURE — 87081 CULTURE SCREEN ONLY: CPT

## 2025-06-13 PROCEDURE — 80053 COMPREHEN METABOLIC PANEL: CPT

## 2025-06-13 PROCEDURE — 83036 HEMOGLOBIN GLYCOSYLATED A1C: CPT

## 2025-06-13 PROCEDURE — 85610 PROTHROMBIN TIME: CPT

## 2025-06-13 PROCEDURE — 85730 THROMBOPLASTIN TIME PARTIAL: CPT

## 2025-06-14 LAB
EST. AVERAGE GLUCOSE BLD GHB EST-MCNC: 123 MG/DL
HBA1C MFR BLD: 5.9 %
MRSA NOSE QL CULT: NORMAL

## 2025-06-16 ENCOUNTER — TELEPHONE (OUTPATIENT)
Dept: OBGYN CLINIC | Facility: CLINIC | Age: 65
End: 2025-06-16

## 2025-06-16 LAB
ATRIAL RATE: 45 BPM
P AXIS: 31 DEGREES
PR INTERVAL: 156 MS
QRS AXIS: 17 DEGREES
QRSD INTERVAL: 90 MS
QT INTERVAL: 484 MS
QTC INTERVAL: 419 MS
T WAVE AXIS: 27 DEGREES
VENTRICULAR RATE: 45 BPM

## 2025-06-16 PROCEDURE — 93010 ELECTROCARDIOGRAM REPORT: CPT | Performed by: INTERNAL MEDICINE

## 2025-06-19 NOTE — PRE-PROCEDURE INSTRUCTIONS
Pre-Surgery Instructions:   Medication Instructions    albuterol (PROVENTIL HFA,VENTOLIN HFA) 90 mcg/act inhaler Stop taking 3 days prior to surgery.    naproxen (NAPROSYN) 500 mg tablet Uses PRN- OK to take day of surgery    Medication instructions for day of surgery reviewed. Please take all instructed medications with only a sip of water. Please do not take any over the counter (non-prescribed) vitamins or supplements for one week prior to date of surgery.      You will receive a call one business day prior to surgery with an arrival time and hospital directions. If your surgery is scheduled on a Monday, the hospital will be calling you on the Friday prior to your surgery. If you have not heard from anyone by 8pm, please call the hospital supervisor through the hospital  at [phone]. ([place] [phone] or [place] [phone]).    Do not eat or drink anything after midnight the night before your surgery, including candy, mints, lifesavers, or chewing gum. Do not drink alcohol 24hrs before your surgery. Try not to smoke at least 24hrs before your surgery.       Follow the pre surgery showering instructions as listed in the “My Surgical Experience Booklet” or otherwise provided by your surgeon's office. Do not use a blade to shave the surgical area 1 week before surgery. It is okay to use a clean electric clippers up to 24 hours before surgery. Do not apply any lotions, creams, including makeup, cologne, deodorant, or perfumes after showering on the day of your surgery. Do not use dry shampoo, hair spray, hair gel, or any type of hair products.     No contact lenses, eye make-up, or artificial eyelashes. Remove nail polish, including gel polish, and any artificial, gel, or acrylic nails if possible. Remove all jewelry including rings and body piercing jewelry.     Wear causal clothing that is easy to take on and off. Consider your type of surgery.    Keep any valuables, jewelry, piercings at home.  Please bring any specially ordered equipment (sling, braces) if indicated.    Arrange for a responsible person to drive you to and from the hospital on the day of your surgery. Please confirm the visitor policy for the day of your procedure when you receive your phone call with an arrival time.     Call the surgeon's office with any new illnesses, exposures, or additional questions prior to surgery.    Please reference your “My Surgical Experience Booklet” for additional information to prepare for your upcoming surgery.

## 2025-06-20 ENCOUNTER — TELEPHONE (OUTPATIENT)
Age: 65
End: 2025-06-20

## 2025-06-26 ENCOUNTER — ANESTHESIA EVENT (OUTPATIENT)
Dept: PERIOP | Facility: HOSPITAL | Age: 65
End: 2025-06-26
Payer: COMMERCIAL

## 2025-06-30 ENCOUNTER — ANESTHESIA (OUTPATIENT)
Dept: PERIOP | Facility: HOSPITAL | Age: 65
End: 2025-06-30
Payer: COMMERCIAL

## 2025-06-30 ENCOUNTER — APPOINTMENT (OUTPATIENT)
Dept: RADIOLOGY | Facility: HOSPITAL | Age: 65
End: 2025-06-30
Payer: COMMERCIAL

## 2025-06-30 ENCOUNTER — HOSPITAL ENCOUNTER (OUTPATIENT)
Facility: HOSPITAL | Age: 65
Setting detail: OUTPATIENT SURGERY
Discharge: HOME | End: 2025-06-30
Attending: ORTHOPAEDIC SURGERY | Admitting: ORTHOPAEDIC SURGERY
Payer: COMMERCIAL

## 2025-06-30 VITALS
WEIGHT: 203.93 LBS | DIASTOLIC BLOOD PRESSURE: 74 MMHG | OXYGEN SATURATION: 96 % | SYSTOLIC BLOOD PRESSURE: 114 MMHG | TEMPERATURE: 97.5 F | RESPIRATION RATE: 18 BRPM | HEART RATE: 55 BPM | BODY MASS INDEX: 30.2 KG/M2 | HEIGHT: 69 IN

## 2025-06-30 DIAGNOSIS — Z47.89 AFTERCARE FOLLOWING SURGERY OF THE MUSCULOSKELETAL SYSTEM: ICD-10-CM

## 2025-06-30 DIAGNOSIS — M19.011 OSTEOARTHRITIS OF GLENOHUMERAL JOINT, RIGHT: Primary | ICD-10-CM

## 2025-06-30 DIAGNOSIS — Z96.611 STATUS POST TOTAL SHOULDER ARTHROPLASTY, RIGHT: ICD-10-CM

## 2025-06-30 DIAGNOSIS — Z96.611 S/P REVERSE TOTAL SHOULDER ARTHROPLASTY, RIGHT: ICD-10-CM

## 2025-06-30 PROCEDURE — C1776 JOINT DEVICE (IMPLANTABLE): HCPCS | Performed by: ORTHOPAEDIC SURGERY

## 2025-06-30 PROCEDURE — C1713 ANCHOR/SCREW BN/BN,TIS/BN: HCPCS | Performed by: ORTHOPAEDIC SURGERY

## 2025-06-30 PROCEDURE — C1769 GUIDE WIRE: HCPCS | Performed by: ORTHOPAEDIC SURGERY

## 2025-06-30 PROCEDURE — 73020 X-RAY EXAM OF SHOULDER: CPT

## 2025-06-30 PROCEDURE — 23472 RECONSTRUCT SHOULDER JOINT: CPT

## 2025-06-30 PROCEDURE — NC001 PR NO CHARGE: Performed by: ORTHOPAEDIC SURGERY

## 2025-06-30 PROCEDURE — 23472 RECONSTRUCT SHOULDER JOINT: CPT | Performed by: ORTHOPAEDIC SURGERY

## 2025-06-30 DEVICE — PALACOS® R+G IS A FAST SETTING POLYMER CONTAINING GENTAMICIN, FOR USE IN BONE SURGERY. MIXING OF THE TWO COMPONENT SYSTEM, CONSISTING OF A POWDER AND A LIQUID, INITIALLY PRODUCES A LIQUID AND THEN A PASTE, WHICH IS USED TO ANCHOR THE PROSTHESIS TO THE BONE. THE HARDENED BONE CEMENT ALLOWS STABLE FIXATION OF THE PROSTHESIS AND TRANSFERS ALL STRESSES PRODUCED IN A MOVEMENT TO THE BONE VIA THE LARGE INTERFACE. INSOLUBLE ZIRCONIUM DIOXIDE IS INCLUDED IN THE CEMENT POWDER AS AN X RAY CONTRAST MEDIUM. THE CHLOROPHYLL ADDITIVE SERVES AS OPTICAL MARKING OF THE BONE CEMENT AT THE SITE OF THE OPERATION.
Type: IMPLANTABLE DEVICE | Site: SHOULDER | Status: FUNCTIONAL
Brand: PALACOS®

## 2025-06-30 DEVICE — SP FBRTAK RC FBRTPE BLK/BLU & STTPE BLU
Type: IMPLANTABLE DEVICE | Site: SHOULDER | Status: FUNCTIONAL
Brand: ARTHREX®

## 2025-06-30 DEVICE — SP FBRTAK RC FBRTPE BLU & STTPE WH/BLK
Type: IMPLANTABLE DEVICE | Site: SHOULDER | Status: FUNCTIONAL
Brand: ARTHREX®

## 2025-06-30 DEVICE — IMPLANTABLE DEVICE
Type: IMPLANTABLE DEVICE | Site: SHOULDER | Status: FUNCTIONAL
Brand: TORNIER SIMPLICITI® SHOULDER SYSTEM

## 2025-06-30 DEVICE — IMPL SYS, MPFL, TIGHTROPE
Type: IMPLANTABLE DEVICE | Site: SHOULDER | Status: FUNCTIONAL
Brand: ARTHREX®

## 2025-06-30 DEVICE — IMPLANTABLE DEVICE
Type: IMPLANTABLE DEVICE | Site: SHOULDER | Status: FUNCTIONAL
Brand: TORNIER PERFORM® ANATOMIC AUGMENTED GLENOID

## 2025-06-30 RX ORDER — CHLORHEXIDINE GLUCONATE ORAL RINSE 1.2 MG/ML
15 SOLUTION DENTAL ONCE
Status: COMPLETED | OUTPATIENT
Start: 2025-06-30 | End: 2025-06-30

## 2025-06-30 RX ORDER — OXYCODONE HYDROCHLORIDE 5 MG/1
5 TABLET ORAL EVERY 4 HOURS PRN
Status: DISCONTINUED | OUTPATIENT
Start: 2025-06-30 | End: 2025-07-01 | Stop reason: HOSPADM

## 2025-06-30 RX ORDER — ONDANSETRON 2 MG/ML
4 INJECTION INTRAMUSCULAR; INTRAVENOUS ONCE AS NEEDED
Status: DISCONTINUED | OUTPATIENT
Start: 2025-06-30 | End: 2025-06-30 | Stop reason: HOSPADM

## 2025-06-30 RX ORDER — VANCOMYCIN HYDROCHLORIDE 1 G/20ML
INJECTION, POWDER, LYOPHILIZED, FOR SOLUTION INTRAVENOUS AS NEEDED
Status: DISCONTINUED | OUTPATIENT
Start: 2025-06-30 | End: 2025-06-30 | Stop reason: HOSPADM

## 2025-06-30 RX ORDER — TRANEXAMIC ACID 10 MG/ML
1000 INJECTION, SOLUTION INTRAVENOUS ONCE
Status: COMPLETED | OUTPATIENT
Start: 2025-06-30 | End: 2025-06-30

## 2025-06-30 RX ORDER — ACETAMINOPHEN 500 MG
1000 TABLET ORAL EVERY 8 HOURS
Qty: 60 TABLET | Refills: 0 | Status: SHIPPED | OUTPATIENT
Start: 2025-06-30

## 2025-06-30 RX ORDER — BUPIVACAINE HYDROCHLORIDE 5 MG/ML
INJECTION, SOLUTION EPIDURAL; INTRACAUDAL; PERINEURAL
Status: COMPLETED | OUTPATIENT
Start: 2025-06-30 | End: 2025-06-30

## 2025-06-30 RX ORDER — FENTANYL CITRATE 50 UG/ML
INJECTION, SOLUTION INTRAMUSCULAR; INTRAVENOUS AS NEEDED
Status: DISCONTINUED | OUTPATIENT
Start: 2025-06-30 | End: 2025-06-30

## 2025-06-30 RX ORDER — ONDANSETRON 4 MG/1
4 TABLET, FILM COATED ORAL EVERY 8 HOURS PRN
Qty: 20 TABLET | Refills: 0 | Status: SHIPPED | OUTPATIENT
Start: 2025-06-30

## 2025-06-30 RX ORDER — ASPIRIN 81 MG/1
81 TABLET, CHEWABLE ORAL 2 TIMES DAILY
Qty: 60 TABLET | Refills: 0 | Status: SHIPPED | OUTPATIENT
Start: 2025-06-30

## 2025-06-30 RX ORDER — FENTANYL CITRATE/PF 50 MCG/ML
50 SYRINGE (ML) INJECTION
Status: DISCONTINUED | OUTPATIENT
Start: 2025-06-30 | End: 2025-06-30 | Stop reason: HOSPADM

## 2025-06-30 RX ORDER — CEFAZOLIN SODIUM 2 G/50ML
2000 SOLUTION INTRAVENOUS ONCE
Status: COMPLETED | OUTPATIENT
Start: 2025-06-30 | End: 2025-06-30

## 2025-06-30 RX ORDER — SODIUM CHLORIDE, SODIUM LACTATE, POTASSIUM CHLORIDE, CALCIUM CHLORIDE 600; 310; 30; 20 MG/100ML; MG/100ML; MG/100ML; MG/100ML
125 INJECTION, SOLUTION INTRAVENOUS CONTINUOUS
Status: DISCONTINUED | OUTPATIENT
Start: 2025-06-30 | End: 2025-07-01 | Stop reason: HOSPADM

## 2025-06-30 RX ORDER — EPHEDRINE SULFATE 50 MG/ML
INJECTION INTRAVENOUS AS NEEDED
Status: DISCONTINUED | OUTPATIENT
Start: 2025-06-30 | End: 2025-06-30

## 2025-06-30 RX ORDER — MAGNESIUM HYDROXIDE 1200 MG/15ML
LIQUID ORAL AS NEEDED
Status: DISCONTINUED | OUTPATIENT
Start: 2025-06-30 | End: 2025-06-30 | Stop reason: HOSPADM

## 2025-06-30 RX ORDER — DEXAMETHASONE SODIUM PHOSPHATE 10 MG/ML
INJECTION, SOLUTION INTRAMUSCULAR; INTRAVENOUS AS NEEDED
Status: DISCONTINUED | OUTPATIENT
Start: 2025-06-30 | End: 2025-06-30

## 2025-06-30 RX ORDER — SODIUM CHLORIDE, SODIUM LACTATE, POTASSIUM CHLORIDE, CALCIUM CHLORIDE 600; 310; 30; 20 MG/100ML; MG/100ML; MG/100ML; MG/100ML
100 INJECTION, SOLUTION INTRAVENOUS CONTINUOUS
Status: CANCELLED | OUTPATIENT
Start: 2025-06-30

## 2025-06-30 RX ORDER — ONDANSETRON 2 MG/ML
INJECTION INTRAMUSCULAR; INTRAVENOUS AS NEEDED
Status: DISCONTINUED | OUTPATIENT
Start: 2025-06-30 | End: 2025-06-30

## 2025-06-30 RX ORDER — NAPROXEN 500 MG/1
500 TABLET ORAL 2 TIMES DAILY WITH MEALS
Qty: 20 TABLET | Refills: 0 | Status: SHIPPED | OUTPATIENT
Start: 2025-06-30

## 2025-06-30 RX ORDER — LIDOCAINE HYDROCHLORIDE 10 MG/ML
INJECTION, SOLUTION EPIDURAL; INFILTRATION; INTRACAUDAL; PERINEURAL AS NEEDED
Status: DISCONTINUED | OUTPATIENT
Start: 2025-06-30 | End: 2025-06-30

## 2025-06-30 RX ORDER — OXYCODONE HYDROCHLORIDE 5 MG/1
5 TABLET ORAL EVERY 6 HOURS PRN
Qty: 10 TABLET | Refills: 0 | Status: SHIPPED | OUTPATIENT
Start: 2025-06-30

## 2025-06-30 RX ORDER — ROCURONIUM BROMIDE 10 MG/ML
INJECTION, SOLUTION INTRAVENOUS AS NEEDED
Status: DISCONTINUED | OUTPATIENT
Start: 2025-06-30 | End: 2025-06-30

## 2025-06-30 RX ORDER — PROPOFOL 10 MG/ML
INJECTION, EMULSION INTRAVENOUS AS NEEDED
Status: DISCONTINUED | OUTPATIENT
Start: 2025-06-30 | End: 2025-06-30

## 2025-06-30 RX ORDER — CHLORHEXIDINE GLUCONATE ORAL RINSE 1.2 MG/ML
15 SOLUTION DENTAL ONCE
Status: DISCONTINUED | OUTPATIENT
Start: 2025-06-30 | End: 2025-07-01 | Stop reason: HOSPADM

## 2025-06-30 RX ORDER — GLYCOPYRROLATE 0.2 MG/ML
INJECTION INTRAMUSCULAR; INTRAVENOUS AS NEEDED
Status: DISCONTINUED | OUTPATIENT
Start: 2025-06-30 | End: 2025-06-30

## 2025-06-30 RX ORDER — ACETAMINOPHEN 325 MG/1
650 TABLET ORAL EVERY 6 HOURS PRN
Status: CANCELLED | OUTPATIENT
Start: 2025-06-30

## 2025-06-30 RX ORDER — MIDAZOLAM HYDROCHLORIDE 2 MG/2ML
INJECTION, SOLUTION INTRAMUSCULAR; INTRAVENOUS
Status: COMPLETED | OUTPATIENT
Start: 2025-06-30 | End: 2025-06-30

## 2025-06-30 RX ORDER — ONDANSETRON 2 MG/ML
4 INJECTION INTRAMUSCULAR; INTRAVENOUS EVERY 6 HOURS PRN
Status: CANCELLED | OUTPATIENT
Start: 2025-06-30

## 2025-06-30 RX ORDER — SENNA AND DOCUSATE SODIUM 50; 8.6 MG/1; MG/1
1 TABLET, FILM COATED ORAL DAILY
Qty: 14 TABLET | Refills: 0 | Status: SHIPPED | OUTPATIENT
Start: 2025-06-30 | End: 2025-07-14

## 2025-06-30 RX ADMIN — SODIUM CHLORIDE, SODIUM LACTATE, POTASSIUM CHLORIDE, AND CALCIUM CHLORIDE 125 ML/HR: .6; .31; .03; .02 INJECTION, SOLUTION INTRAVENOUS at 06:31

## 2025-06-30 RX ADMIN — PROPOFOL 200 MG: 10 INJECTION, EMULSION INTRAVENOUS at 07:36

## 2025-06-30 RX ADMIN — SODIUM CHLORIDE, SODIUM LACTATE, POTASSIUM CHLORIDE, AND CALCIUM CHLORIDE: .6; .31; .03; .02 INJECTION, SOLUTION INTRAVENOUS at 09:21

## 2025-06-30 RX ADMIN — BUPIVACAINE HYDROCHLORIDE 10 ML: 5 INJECTION, SOLUTION EPIDURAL; INTRACAUDAL; PERINEURAL at 07:20

## 2025-06-30 RX ADMIN — ONDANSETRON 4 MG: 2 INJECTION INTRAMUSCULAR; INTRAVENOUS at 07:50

## 2025-06-30 RX ADMIN — FENTANYL CITRATE 50 MCG: 50 INJECTION, SOLUTION INTRAMUSCULAR; INTRAVENOUS at 08:19

## 2025-06-30 RX ADMIN — CHLORHEXIDINE GLUCONATE 15 ML: 1.2 RINSE ORAL at 06:23

## 2025-06-30 RX ADMIN — MIDAZOLAM 2 MG: 1 INJECTION INTRAMUSCULAR; INTRAVENOUS at 07:19

## 2025-06-30 RX ADMIN — SUGAMMADEX 200 MG: 100 INJECTION, SOLUTION INTRAVENOUS at 10:05

## 2025-06-30 RX ADMIN — EPHEDRINE SULFATE 10 MG: 50 INJECTION, SOLUTION INTRAVENOUS at 07:48

## 2025-06-30 RX ADMIN — TRANEXAMIC ACID 1000 MG: 10 INJECTION, SOLUTION INTRAVENOUS at 07:50

## 2025-06-30 RX ADMIN — GLYCOPYRROLATE 0.2 MCG: 0.2 INJECTION, SOLUTION INTRAMUSCULAR; INTRAVENOUS at 09:20

## 2025-06-30 RX ADMIN — DEXAMETHASONE SODIUM PHOSPHATE 10 MG: 10 INJECTION, SOLUTION INTRAMUSCULAR; INTRAVENOUS at 07:50

## 2025-06-30 RX ADMIN — GLYCOPYRROLATE 0.2 MCG: 0.2 INJECTION, SOLUTION INTRAMUSCULAR; INTRAVENOUS at 07:33

## 2025-06-30 RX ADMIN — PHENYLEPHRINE HYDROCHLORIDE 30 MCG/MIN: 10 INJECTION INTRAVENOUS at 07:57

## 2025-06-30 RX ADMIN — CEFAZOLIN SODIUM 2000 MG: 2 SOLUTION INTRAVENOUS at 07:35

## 2025-06-30 RX ADMIN — ROCURONIUM BROMIDE 50 MG: 10 INJECTION, SOLUTION INTRAVENOUS at 07:37

## 2025-06-30 RX ADMIN — BUPIVACAINE 20 ML: 13.3 INJECTION, SUSPENSION, LIPOSOMAL INFILTRATION at 07:20

## 2025-06-30 RX ADMIN — LIDOCAINE HYDROCHLORIDE 50 MG: 10 INJECTION, SOLUTION EPIDURAL; INFILTRATION; INTRACAUDAL; PERINEURAL at 07:36

## 2025-06-30 RX ADMIN — FENTANYL CITRATE 50 MCG: 50 INJECTION, SOLUTION INTRAMUSCULAR; INTRAVENOUS at 07:36

## 2025-06-30 NOTE — H&P
Patient Name: Figueroa Chaudhry      : 1960       MRN: [id number]   Encounter Provider: Amol Gregory MD   Encounter Date: 25  Encounter department: [facility]       PREVIOUS DISCUSSION, No changes today.     Assessment & Plan  Osteoarthritis of glenohumeral joint, right     Orders:    Comfort Arm Sling    Case request operating room: ARTHROPLASTY SHOULDER, Biceps Tenodesis; Standing    Comprehensive metabolic panel; Future    CBC and Platelet; Future    APTT; Future    Protime-INR; Future    HEMOGLOBIN A1C W/ EAG ESTIMATION; Future    MRSA culture; Future    EKG 12 lead; Future    Arc 2.0        Plan:  Figueroa and I reviewed his MRI together.  He does have evidence severe glenohumeral osteoarthritis with an intact rotator cuff.  There has been some degeneration at the glenoid with a retroversion angle of about 13 to 15 degrees.  I do not feel he would be a good candidate for pyrocarbon shoulder replacement as this may lead to further wear of his glenoid given the retroversion.. In my opinion he would benefit most from an anatomic total shoulder arthroplasty.  A reverse total shoulder arthroplasty may have limitations in motion versus the anatomic total shoulder arthroplasty.  I provided detailed information of the anatomic total shoulder arthroplasty and limitations following surgery.  We also discussed recovery.  He will be in a sling following surgery and will require extensive amount of physical therapy.  Typically full recovery will take 4 to 6 months.     Given that the patient has failed conservative treatment and continues to have severe pain and/or dysfunction that limits their activities of daily living, they would like to proceed with operative intervention.  We discussed risks, benefits and alternatives to surgery today in the clinic. We discussed with the patient the risks of no treatment, non-operative treatment, and operative treatment. The  risks of operative intervention were discussed and include but are not limited to: Infection, bleeding, stiffness, loss of range of motion, blood clot, failure of surgery, fracture, delayed union, nonunion, malunion, risk of potential future arthritis, continued problems with swelling, injury to surrounding structures/nerve/artery/vein, recurrence of cysts, failure of hardware, retained hardware and/or foreign body, symptomatic hardware, and continued instability, pain, dysfunction, or disability despite repair.      Specifically, for      Shoulder Arthroplasty: Infection needing multiple staged surgery for treatment, Revision surgery, failure of rotator cuff repair, loss of motion and strength, activity limitations, nerve palsies, acromial stress fractures, coracoid fractures, glenoid/humeral fractures, periprosthetic fractures        Prior to surgery he will require clearance from his primary care physician.  He was fitted for sling today and asked to bring it the day of surgery.  He will sit with our surgical scheduler today to arrange a mutually convenient date for surgery.     Follow-up:  No follow-ups on file.      _____________________________________________________  CHIEF COMPLAINT:      Chief Complaint   Patient presents with    Right Shoulder - Follow-up            SUBJECTIVE:  Figueroa Chaudhry is a 64 y.o. male who presents for evaluation of his right shoulder.  Figueroa has known history of severe right shoulder osteoarthritis and at last visit we discussed nonoperative versus operative treatment.  For preoperative planning purposes he did obtain an MRI of the right shoulder questioning integrity of the rotator cuff and the blueprint CT scan.  He has had those studies completed and we will discuss how to move forward.     PAST MEDICAL HISTORY:  Past Medical History        Past Medical History:   Diagnosis Date    Arthritis       knees    Asthma      Cancer (HCC)       stg 1-prostate    Colon polyp       Extrinsic asthma      High arches      Plantar fasciitis      Undescended right testis              PAST SURGICAL HISTORY:  Past Surgical History         Past Surgical History:   Procedure Laterality Date    BILATERAL HIP ARTHROSCOPY         resurfaced    COLONOSCOPY        HERNIA REPAIR Bilateral       age 4yr    KNEE ARTHROSCOPY Bilateral       meniscus repair    LASIK        ORCHIECTOMY        TONSILLECTOMY        WISDOM TOOTH EXTRACTION                FAMILY HISTORY:  Family History         Family History   Problem Relation Age of Onset    Bone cancer Father      Prostate cancer Father      Cancer Father           prostate w/mets    COPD Mother           emphysema-smoker    Hypertension Mother      Mental illness Neg Hx              SOCIAL HISTORY:  Social History   Social History            Tobacco Use    Smoking status: Former       Current packs/day: 0.00       Average packs/day: 1 pack/day for 11.0 years (11.0 ttl pk-yrs)       Types: Cigarettes       Start date: 1971       Quit date: 1982       Years since quittin.3       Passive exposure: Never    Smokeless tobacco: Never    Tobacco comments:       quit 35 yrs ago   Vaping Use    Vaping status: Never Used   Substance Use Topics    Alcohol use: Yes       Alcohol/week: 2.0 standard drinks of alcohol       Types: 2 Glasses of wine per week       Comment: social    Drug use: Never            MEDICATIONS:    Current Medications      Current Outpatient Medications:     albuterol (PROVENTIL HFA,VENTOLIN HFA) 90 mcg/act inhaler, INHALE 2 PUFFS EVERY 6 HOURS AS NEEDED FOR WHEEZING, Disp: 18 g, Rfl: 0    naproxen (NAPROSYN) 500 mg tablet, TAKE 1 TABLET BY MOUTH TWICE A DAY WITH MEALS, Disp: 60 tablet, Rfl: 1        ALLERGIES:  Allergies   No Known Allergies        LABS:  HgA1c:         Lab Results   Component Value Date     HGBA1C 5.7 (H) 2024      BMP:         Lab Results   Component Value Date     CALCIUM 9.5 2024     K 4.4 2024  "    CO2 26 06/19/2024      06/19/2024     BUN 24 06/19/2024     CREATININE 0.90 06/19/2024      CBC: No components found for: \"CBC\"     _____________________________________________________  Review of Systems      Ortho Exam   Right Shoulder Exam      Range of Motion   Active abduction:  140   Right shoulder external rotation: 3.   Forward flexion:  150   Right shoulder internal rotation 0 degrees: Patient states he cannot perform.      Muscle Strength   Abduction: 5/5   Internal rotation: 5/5   External rotation: 5/5   Supraspinatus: 5/5   Subscapularis: 5/5      Tests   Impingement: positive     Other   Sensation: normal  Pulse: present (2+ radial)     Comments:  Crepitus with range of motion  Physical Exam  Vitals reviewed.   Constitutional:       Appearance: He is well-developed.   HENT:      Head: Normocephalic and atraumatic.   Eyes:      General:         Right eye: No discharge.         Left eye: No discharge.      Conjunctiva/sclera: Conjunctivae normal.   Cardiovascular:      Rate and Rhythm: Regular rhythm.   Pulmonary:      Effort: Pulmonary effort is normal. No respiratory distress.   Musculoskeletal:      Cervical back: Normal range of motion and neck supple.      Comments: As noted in the HPI   Abdomen: Soft, NT  Skin:     General: Skin is warm and dry.   Neurological:      Mental Status: He is alert and oriented to person, place, and time.   Psychiatric:         Behavior: Behavior normal.         _____________________________________________________  STUDIES REVIEWED:  I personally reviewed the pertinent images and my independent interpretation is as follows:  MRI shows intact rotator cuff with tendinosis present.  CT scan of the glenohumeral joint shows severe glenohumeral arthritis with a B2 glenoid and about 13 to 15 degrees of retroversion.     PROCEDURES PERFORMED:  No Procedures performed today          Scribe Attestation    I,:  Gomez Arredondo am acting as a scribe while in the presence of " the attending physician.:       I,:  Amol Gregory MD personally performed the services described in this documentation    as scribed in my presence.:

## 2025-06-30 NOTE — ANESTHESIA POSTPROCEDURE EVALUATION
Post-Op Assessment Note    CV Status:  Stable  Pain Score: 0    Pain management: adequate       Mental Status:  Alert and awake   Hydration Status:  Euvolemic   PONV Controlled:  Controlled   Airway Patency:  Patent     Post Op Vitals Reviewed: Yes    No anethesia notable event occurred.    Staff: Anesthesiologist           Last Filed PACU Vitals:  Vitals Value Taken Time   Temp 97.5 °F (36.4 °C) 06/30/25 10:33   Pulse 57 06/30/25 11:03   /56 06/30/25 11:03   Resp 16 06/30/25 11:03   SpO2 95 % 06/30/25 11:03       Modified Nilay:     Vitals Value Taken Time   Activity 2 06/30/25 10:33   Respiration 2 06/30/25 10:33   Circulation 2 06/30/25 10:33   Consciousness 1 06/30/25 10:33   Oxygen Saturation 2 06/30/25 10:33     Modified Nilay Score: 9

## 2025-06-30 NOTE — PERIOPERATIVE NURSING NOTE
Received from [facility]. Awake,alert. Right shoulder dsg dry, intact. Pt able to wiggle fingers of right hand slightly. Denies pain at present.  Sign other at bedside, call bell in reach, rails up.

## 2025-06-30 NOTE — ANESTHESIA PROCEDURE NOTES
Peripheral Block    Patient location during procedure: holding area  Start time: 6/30/2025 7:20 AM  Reason for block: at surgeon's request and post-op pain management  Staffing  Performed by: Ishan Henderson MD  Authorized by: Ishan Henderson MD    Preanesthetic Checklist  Completed: patient identified, IV checked, site marked, risks and benefits discussed, surgical consent, monitors and equipment checked, pre-op evaluation and timeout performed  Peripheral Block  Prep: ChloraPrep  Patient monitoring: frequent blood pressure checks, continuous pulse oximetry and heart rate  Block type: Interscalene  Laterality: right  Injection technique: single-shot  Procedures: ultrasound guided, Ultrasound guidance required for the procedure to increase accuracy and safety of medication placement and decrease risk of complications.  Ultrasound permanent image saved  bupivacaine (PF) (MARCAINE) 0.5 % injection 20 mL - Perineural   10 mL - 6/30/2025 7:20:00 AM  bupivacaine liposomal (EXPAREL) 1.3 % injection 20 mL - Perineural   20 mL - 6/30/2025 7:20:00 AM  midazolam (VERSED) injection 0.5 mg - Intravenous   2 mg - 6/30/2025 7:19:00 AM  Needle  Needle type: Stimuplex   Needle gauge: 22 G  Needle length: 2 in  Needle localization: anatomical landmarks and ultrasound guidance  Needle insertion depth: 4.5 cm  Assessment  Injection assessment: incremental injection, frequent aspiration, injected with ease, negative aspiration, negative for heart rate change, no paresthesia on injection, no symptoms of intraneural/intravenous injection and needle tip visualized at all times  Paresthesia pain: none  Post-procedure:  site cleaned  patient tolerated the procedure well with no immediate complications

## 2025-06-30 NOTE — ANESTHESIA PREPROCEDURE EVALUATION
Procedure:  ARTHROPLASTY SHOULDER, Biceps Tenodesis (Right: Shoulder)    Relevant Problems   ANESTHESIA (within normal limits)      CARDIO   (+) Mixed hyperlipidemia      ENDO (within normal limits)      /RENAL   (+) Prostate cancer (HCC)      MUSCULOSKELETAL   (+) Exercise-induced bronchospasm   (+) Osteoarthritis of glenohumeral joint, right   (+) Osteoarthritis of knee      PULMONARY (within normal limits)        Physical Exam    Airway     Mallampati score: II  TM Distance: >3 FB  Neck ROM: full  Mouth opening: >= 4 cm      Cardiovascular  Rhythm: regular, Rate: normal, Pulse is palpable.     Dental   No notable dental hx     Pulmonary   Breath sounds clear to auscultation    Neurological    He appears awake, alert and oriented x3.      Other Findings        Anesthesia Plan  ASA Score- 2     Anesthesia Type- general and regional with ASA Monitors.         Additional Monitors:     Airway Plan:            Plan Factors-Exercise tolerance (METS): >4 METS.    Chart reviewed. EKG reviewed.   Patient summary reviewed.    Patient is not a current smoker.              Induction- intravenous.    Postoperative Plan- .   Monitoring Plan - Monitoring plan - standard ASA monitoring  Post Operative Pain Plan - non-opiod analgesics        Informed Consent- Anesthetic plan and risks discussed with patient.  I personally reviewed this patient with the CRNA. Discussed and agreed on the Anesthesia Plan with the CRNA..      NPO Status:  Vitals Value Taken Time   Date of last liquid 06/29/25 06/30/25 06:10   Time of last liquid 1900 06/30/25 06:10   Date of last solid 06/29/25 06/30/25 06:10   Time of last solid 1900 06/30/25 06:10

## 2025-06-30 NOTE — OP NOTE
OPERATIVE REPORT  PATIENT NAME: Figueroa Chaudhry    :  1960  MRN: [id number]  Pt Location: [facility] OR ROOM 02    SURGERY DATE: 2025    Surgeons and Role:     * Amol Gregory MD - Primary     * Dedra Butt PA-C - Assisting    Preop Diagnosis:  Osteoarthritis of glenohumeral joint, right [M19.011]    Post-Op Diagnosis Codes:     * Osteoarthritis of glenohumeral joint, right [M19.011]    Procedure(s):  Right - ARTHROPLASTY SHOULDER. Biceps Tenodesis    Specimen(s):  * No specimens in log *    Estimated Blood Loss:   Minimal    Drains:  * No LDAs found *    Anesthesia Type:   General w/ Interscalene Block    Operative Indications:  Osteoarthritis of glenohumeral joint, right [M19.011]      Operative Findings:  Severe glenohumeral arthritis       Complications:   None    Procedure and Technique:  Procedure and Technique:  Implants:  Tornier Cortilock glenoid [id number] degree augment  Sized 2Tornier simplicity humeral component  Humeral head size 52 x 19 mm        Operative Indications:  Primary osteoarthritis of  shoulder   71 y.o. Male with severe arthritis of the Right shoulder. We had a long discussion regarding operative vs non-operative treatment options.  We discussed conservative options including physical therapy, steroid injections, activity modification, and anti-inflammatory treatment.  The patient has done nonoperative treatment including injections, physical therapy, activity modification and anti-inflammatories without improvement..  He would like definitive treatment for this.  We did discuss possible total shoulder arthroplasty which the patient would like to proceed with. The risks of operative intervention were discussed and include but are not limited to: Infection, bleeding, stiffness, loss of range of motion, blood clot, failure of surgery, fracture, delayed union, nonunion, malunion, risk of potential future arthritis, continued problems with swelling, injury to surrounding  structures/nerve/artery/vein, recurrence of cysts, failure of hardware, retained hardware and/or foreign body, symptomatic hardware, and continued instability, pain, dysfunction, or disability despite repair.      Specifically, for   Shoulder Arthroplasty: Infection needing multiple staged surgery for treatment, Revision surgery, failure of rotator cuff repair, loss of motion and strength, activity limitations, nerve palsies, glenoid/humeral fractures, periprosthetic fractures     Operative Findings:  Severe glenohumeral arthritis        Complications:   None     Procedure and Technique:  The patient was met in the preoperative holding area where a site marking was completed with the surgical staff.  The patient was met by the anesthesia team and anesthesia plan was made.  The patient was given a interscalene nerve block preoperatively.  Patient was then brought back to the operating room placed on the operating table.  All bony prominences were well-padded.  General anesthesia was induced without complication.  Patient was then placed in the beachchair positioning making sure again all bony prominences were well-padded and her spine was in neutral alignment.  Appropriate perioperative antibiotics were given, 2 g of Ancef.  The upper extremity was prepped and draped in normal sterile fashion.  A final surgical timeout was then performed identifying the correct patient, correct site and correct surgery.  All were in agreement we proceeded.     A standard deltopectoral approach was made.  Incision made through skin and subcutaneous tissue.  Biasurg solution was used to wash the wound after initial incision.  The deltopectoral interval was identified.  The cephalic vein was protected throughout.  Adhesions were bluntly taken off the subacromial space and the deltoid and axillary nerve were protected with a brown deltoid retractor.  The biceps tendon was identified and the sheath was split.  There was tenosynovitis  surrounding it.  A biceps tenodesis was performed with 0 Ethibond suture tenodesing into the pectoralis major tendon.     A subscapularis peel was then performed.  Capsular attachments to the inferior humeral head were taken off to help release the humeral head.  Osteophytes inferiorly were removed with a rongeur.  The humerus was then dislocated with and protected on all sides.     The humerus was then prepped.  Extramedullary guide was used and a humeral head cut was then performed 135 degrees, taking care not to injure the rotator cuff.  A size 2 nucleus or simplicity was placed after a central pin was placed.  The head was measured at 52 x 19.  The size 2 nucleus fit well and a Was placed over top of this.  Using retractors posteriorly and anteriorly the humerus was retracted giving good access to the glenoid.  Capsular attachments to the subscapularis were divided.  The labrum was then removed.  Care was taken inferiorly to protect the axillary nerve.  A Mccurdy was then used to take off any remaining cartilage.  The central pin was then placed in the central portion of the glenoid with an augment guide given his posterior wear and retroversion.  The neoglenoid was then reamed down to bleeding bone.  This was followed up by the augment reamer.  A central peg was drilled followed by 3 smaller pegs for the glenoid component.  The medium size cord lock glenoid with 35 degree augment had excellent fit in the glenoid.  Following this the component was opened.  Pulse lavage was used to clean out the glenoid and any remaining bone.  The 3 outer holes were then cleaned and dried.  Antibiotic cement was then mixed on the back table and placed in the 3 peripheral holes.  Following this the medium cortilock glenoid component was placed and impacted into position with excellent fit.     The humerus was then brought back out posteriorly and trialed head was placed.  This had excellent anatomic reconstruction of the  glenohumeral joint.  There is about 50% pushback posteriorly with the head coming back anteriorly.  There was good subscap tension.  The trial components were then removed.  The size 2 nucleus was then placed followed by the humeral head component which was impacted into position on the Parikh taper.  This had excellent fit and his bone quality was excellent.  The shoulder was reduced and brought through range of motion.  There was stability in all directions and excellent range of motion.     A tug test was normal at the end of the case.     The subscapularis was repaired with a double row repair.  The lesser tuberosity was cleared of soft tissue and using a rongeur bleeding surface for healing was prepared.  Two 2.6 mm fiber tack anchors were placed for a medial row.  The sutures were then brought through the subscapularis tendon using a scorpion.  This was done in a ripstop fashion with the fiber tape more medial than the suture tape.  This was done for the proximal and distal anchor.  The suture tape was then tied to reduce the tendon back down to the lesser tuberosity.  Following this the limbs of the suture were then brought into a lateral row with 3.9 mm swivel lock anchors.  The proximal 1 was drilled and anchor placed.  The sutures were tensioned with good compression of the tendon and the bone.  This was again done for the more distal anchor..  This gave an excellent repair with good tension and good movement with range of motion of the shoulder.  #2 FiberWire was used to close the rotator interval.        Pulse lavage was used throughout the case especially prior to putting in final hardware. Biasurg solution was used to wash the wound.  The wound was then copiously irrigated with both sterile saline and Irrisept solution.  1 g of vancomycin powder was placed in the wound.  The deltopectoral interval was then closed with 0 Vicryl.  The skin and subcutaneous tissue was closed in a layered fashion with 2-0  monocryl and 3-0 running subcuticular Stratafix suture.  Steri-Strips were placed on the wound.  A silver impregnated wound dressing was applied to the wound.  Sling was then applied.    I was present for the entire procedure., A qualified resident physician was not available., and A physician assistant was required during the procedure for retraction, tissue handling, dissection and suturing.    Patient Disposition:  PACU          SIGNATURE: Amol Grgeory MD  DATE: June 30, 2025  TIME: 10:00 AM    ANATOMIC TOTAL SHOULDER ARTHROPLASTY REHABILITATION GUIDELINES  PHASE PRECAUTIONS AND GUIDELINES GOALS EXERCISES CRITERIA TO ADVANCE EXAMINATION   1  (Post-operative day 1 to   Post-operative week 6) Sling 24/7 X 2 weeks (remove for grooming and home exercise program 3-5x/day)    May remove sling at home after 4 weeks -  wear for sleep and in community; may remove abduction pillow    Avoid hand behind the back and external rotation at 90 degrees    Avoid ER >30 deg with arm at side    No shoulder active elevation    No submersion in water until after 4 weeks    No weight bearing on shoulder Protect healing subscapularis, joint capsule and biceps tenodesis    Prevent infection    Promote distal circulation and control swelling    Proper sling fit    PROM:  130 elevation and 30 ER (unless other specified limit by surgeon)    PROM to 140/40 (elevation/ER) after week 4 Pendulum    Active elbow, wrist and hand, scapular retraction    Passive elevation to 130 deg in scapular plane (eg. Rope and pulley; table slide; supine well-arm assisted)    ER to 30 deg in scapular plane with arm at side (eg. Well arm or dowel assisted, or table supported and rotate away - can progress to active as tolerated within 30 degree limit    Non-impact aerobic activities:  walking; stationary bike when incision is healed Pain less than 3/10 with PROM    Healing incision without signs of infection    Clearance by surgeon after radiograph  assessment at post-operative visit Wound assessment    Swelling assessment of upper extremity    Neurovascular assessment of upper extremity    Sling fit and ability to jayro/doff properly    Patient reported outcome measure    Pain level    Range of motion for elevation (passive only) and external rotation (passive and active) in range of motion constraints (0-30)           PHASE PRECAUTIONS AND GUIDELINES GOALS EXERCISES CRITERIA TO ADVANCE EXAMINATION   2  (Post-operative week 6 to 12) Discontinue sling at all times - wean as tolerated    Motion recovery without excessive force    May begin ER at 90 degrees of abduction in scapular plane to 60 degree limit    May begin functional IR with hand behind back gently - avoid forceful overpressure    Weight-bearing on the UE for use of assistive device allowed    No closed chain exercise    Advance arm use in ADLs gradually Wean from sling at 6 weeks    Optimize PROM     Develop AROM to equal PROM    Establish dynamic stability of shoulder with deltoid, rotator cuff and deangelo-scapular strengthening through AROM against gravity Stretching gently beyond Phase 1 precautionary limits for elevation and ER(0) without excessive force    Begin ER(90) to 60 degree limit in scapular plane    Active forward elevation progression when passive motion restored to expected level:  supine, gradual incline to vertical, short to long lever arm (bent to straight elbow)    Active ER/IR with arm at side:  sitting to side-lying    Scapular AROM against gravity (eg prone extension to hip with scapular retraction/depression; prone horizontal abduction to neutral)    IR behind back gently without forceful overpressure    Aerobic activities:  walking; elliptical without UE resistance, stationary bike  AROM against gravity equals PROM    No pain    Need higher level demand than ADL functions (eg sport or work) AROM shoulder elevation, ER(0), and functional IR (highest spinal level achieved with  thumb behind back without overpressure    PROM shoulder elevation, ER(0), ER(90) up to 60 degrees; and IR(90)    Patient reported outcome measure    Pain level           PHASE PRECAUTIONS AND GUIDELINES GOALS EXERCISES CRITERIA TO ADVANCE  EXAMINATION   3  (Post-operative month 3-5) May begin to add resistance to shoulder - low loading with more repetition advised    Keep weight training below shoulder level and anterior to the frontal plane    Avoid impact loading such as sledgehammer, wood chopping, bench pressing, push-ups   Achieve functional demands for work and/or sport - patient specific    Gradual increase in deltoid and deangelo-scapular muscle strength    Maintain functional mobility of shoulder    Pain-free use of shoulder in ADLs and leisure activities Gentle end range stretching especially in forward elevation as part of a daily lifelong routine    Deltoid, rotator cuff, scapular muscle and other upper body strengthening with light resistance (free weights, elastic bands, or gym machines)     Biceps strengthening with weighted elbow flexion    Functional sports specific training considering total body (eg core, endurance)    Aerobic activities:  walking, stationary bicycle, elliptical, jogging, swimming when strength is normalized in rotator cuff and scapular stabilizers Strength and mobility needed for specific sport (for patients with higher level goals) PROM for elevation, ER(0); ER(90); IR(90)    AROM for elevation, ER(0) and functional IR    Scapulohumeral rhythm/biomechanics of active movement strategies    Strength testing for deltoid, rotator cuff, scapular muscles    Sports specific analysis  (eg trunk and hip rotation for golf)    Patient reported outcome measure    Pain             PHASE PRECAUTIONS AND GUIDELINES GOALS EXERCISES CRITERIA TO ADVANCE TO NEXT PHASE EXAMINATION   4  (Post-operative month 5+) Higher level exercise such as flow yoga allowed    Initiate sports progression: golf, tennis  - full return after 6 months    No lifting > 25 lbs   Pain-free use of shoulder in sport/activity such as golf, yoga, swimming Daily stretching    Maintenance rotator cuff, scapular, core and sports-specific exercise NA ROM, strength    Sports specific motion analysis    Patient reported outcome measure    Pain

## 2025-06-30 NOTE — PROGRESS NOTES
Patient alert and awake, dressing clean, dry and intact, vital signs WNL. Patient transferred to APU via stretcher with Eric. Bedside report given to MARY ALICE Madden. Stretcher in lowest position and locked, side rails up, call bell within reach.

## 2025-06-30 NOTE — DISCHARGE INSTR - AVS FIRST PAGE
POSTOPERATIVE INSTRUCTIONS following SHOULDER SURGERY    MEDICATIONS:  Resume all home medications unless otherwise instructed by your surgeon.  Pain Medication:   Take Tylenol and Naproxen on prescribed schedule for 7 days  Take Oxycodone as needed for severe pain   If you were given a regional anesthetic (nerve block), it is helpful to take your pain medication before the block wear off.    Possible side effects include nausea, constipation, and urinary retention.  If you experience these side effects, please call our office for assistance.  Pain med refills are authorized only during office hours (8am-4pm, Mon-Fri).  Blood Clot Prevention: take Aspirin 81 mg twice daily for 4 weeks after surgery.    WOUND CARE:  Keep the dressing clean and dry.  Light drainage may occur the first 2 days postop.  DO NOT REMOVE THE DRESSINGS until you are seen in our office.  Cover the bandages appropriately while washing to keep them from getting wet.  Please call our office ([phone]) if you experience either of the following:  Sudden increase in swelling, redness, or warmth at the surgical site  Excessive incisional drainage that persists beyond the 3rd day after surgery  Oral temperature greater than 101 degrees, not relieved with Tylenol  Shortness of breath, chest pain, nausea, or any other concerning symptoms    ICE:  You may use Ice to help with pain control   Place ice on the operative site for 20 minutes at a time when you are in pain     SLING:  Wear your sling AT ALL TIMES (including sleep) until your first postoperative office visit.  You may remove the sling for showering but must keep your arm at your side.    ACTIVITY:   DO NOT lift, carry, push, or pull anything with your operative arm.  Shoulder:  DO NOT actively (on your own) raise your operative arm away from the side of you body or rotate it out away from your body unless instructed by your surgeon or physical therapist.  Place a pillow behind the elbow  while lying down.  Sleeping in a more upright position (recliner) may be more comfortable initially.  Wrist and Finger Motion:  You may take your elbow out of the sling carefully to move your wrist and fingers. Follow your motion precautions for the shoulder. Replace the sling immediately after.     PHYSICAL THERAPY:  Begin therapy 2 WEEKS AFTER SURGERY.  You were given a prescription for therapy at your preoperative office visit or on the day of surgery.  If you do not have physical therapy scheduled yet, please call our office for assistance.    FOLLOW-UP APPOINTMENT:  10-14 days following surgery with:      Dr. Amol Gregory MD    [facility] [address], [address]  [place] [address]    Phone:   [phone]

## 2025-06-30 NOTE — ANESTHESIA POSTPROCEDURE EVALUATION
Post-Op Assessment Note    CV Status:  Stable  Pain Score: 0    Pain management: adequate       Mental Status:  Sleepy and arousable   Hydration Status:  Stable   PONV Controlled:  Controlled   Airway Patency:  Patent and adequate     Post Op Vitals Reviewed: Yes    No anethesia notable event occurred.    Staff: CRNA           Last Filed PACU Vitals:  Vitals Value Taken Time   Temp 97.5 °F (36.4 °C) 06/30/25 10:33   Pulse 65 06/30/25 10:33   /63 06/30/25 10:33   Resp 14 06/30/25 10:33   SpO2 97 % 06/30/25 10:33

## 2025-07-01 ENCOUNTER — TELEPHONE (OUTPATIENT)
Age: 65
End: 2025-07-01

## 2025-07-01 NOTE — TELEPHONE ENCOUNTER
Caller: jarad Osborne [facility]     Doctor: Dr. Gregory     Reason for call: Confirming patient had his sx on 6/30 asked for cpt code    Call back#: [phone]

## 2025-07-15 ENCOUNTER — APPOINTMENT (OUTPATIENT)
Dept: RADIOLOGY | Facility: CLINIC | Age: 65
End: 2025-07-15
Attending: ORTHOPAEDIC SURGERY
Payer: COMMERCIAL

## 2025-07-15 ENCOUNTER — OFFICE VISIT (OUTPATIENT)
Dept: OBGYN CLINIC | Facility: CLINIC | Age: 65
End: 2025-07-15

## 2025-07-15 VITALS — WEIGHT: 203 LBS | BODY MASS INDEX: 30.07 KG/M2 | HEIGHT: 69 IN

## 2025-07-15 DIAGNOSIS — Z96.611 S/P SHOULDER REPLACEMENT, RIGHT: Primary | ICD-10-CM

## 2025-07-15 DIAGNOSIS — Z96.611 S/P SHOULDER REPLACEMENT, RIGHT: ICD-10-CM

## 2025-07-15 PROCEDURE — 73030 X-RAY EXAM OF SHOULDER: CPT

## 2025-07-15 PROCEDURE — 99024 POSTOP FOLLOW-UP VISIT: CPT | Performed by: ORTHOPAEDIC SURGERY

## 2025-07-15 NOTE — PROGRESS NOTES
Patient Name: Figueroa Chaudhry      : 1960       MRN: [id number]   Encounter Provider: Amol Gregory MD   Encounter Date: 25  Encounter department: [facility] [place]         Assessment & Plan  S/P shoulder replacement, right    Orders:  •  XR shoulder 2+ vw right; Future       Plan:  Figueroa is doing very well postoperatively.  We discussed his procedure in detail and reviewed today's x-rays together.   His surgical incision is clean, dry and intact with no evidence of infection.  There is no erythema or warmth.  The right upper extremity is neurovascularly intact with normal sensations to light touch and 2+ radial pulse.  He will remain in the sling for an additional 4 weeks.  He begins physical therapy tomorrow.  He should avoid excessive external rotation of the shoulder.  He should not do anything new unless under the guidance of his physical therapist.  I will see him back in 4 weeks.    Follow-up:  Return in about 4 weeks (around 2025) for Recheck.     _____________________________________________________  CHIEF COMPLAINT:  Chief Complaint   Patient presents with   • Right Shoulder - Post-op         SUBJECTIVE:  Figueroa Chaudhry is a 64 y.o. male who presents today for postop evaluation of the left shoulder arthroplasty and biceps tenodesis performed 2 weeks and 1 day ago.  The date of surgery was 2025.  Figueroa states he is doing well.  He has no complaints of excessive pain.  He states the majority of his pain occurs at night. He has been compliant with the use of his sling.  He will remove the sling to gently range the elbow and for hygiene.  He denies numbness or tingling in the right upper extremity.    PAST MEDICAL HISTORY:  Past Medical History[1]    PAST SURGICAL HISTORY:  Past Surgical History[2]    FAMILY HISTORY:  Family History[3]    SOCIAL HISTORY:  Social History[4]    MEDICATIONS:  Current  "Medications[5]    ALLERGIES:  Allergies[6]    LABS:  HgA1c:   Lab Results   Component Value Date    HGBA1C 5.9 (H) 06/13/2025     BMP:   Lab Results   Component Value Date    CALCIUM 9.2 06/13/2025    K 4.2 06/13/2025    CO2 22 06/13/2025     06/13/2025    BUN 16 06/13/2025    CREATININE 0.96 06/13/2025     CBC: No components found for: \"CBC\"    _____________________________________________________  Review of Systems   Constitutional:  Negative for chills, fever and unexpected weight change.   HENT:  Negative for hearing loss, nosebleeds and sore throat.    Eyes:  Negative for pain, redness and visual disturbance.   Respiratory:  Negative for cough, shortness of breath and wheezing.    Cardiovascular:  Negative for chest pain, palpitations and leg swelling.   Gastrointestinal:  Negative for abdominal pain, nausea and vomiting.   Endocrine: Negative for polyphagia and polyuria.   Genitourinary:  Negative for dysuria and hematuria.   Musculoskeletal:         See HPI   Skin:  Negative for rash and wound.   Neurological:  Negative for dizziness, numbness and headaches.   Psychiatric/Behavioral:  Negative for decreased concentration and suicidal ideas. The patient is not nervous/anxious.         Left Shoulder Exam      Comments:  The surgical incision is clean, dry and intact.  No erythema or warmth.  The left upper extremity is neurovascularly intact with normal sensation to light touch and 2+ radial pulse.  Normal motor function in the ulnar, radial and median nerve distributions.  Brisk capillary refill in all digits.             Physical Exam  Vitals reviewed.   Constitutional:       Appearance: He is well-developed.   HENT:      Head: Normocephalic and atraumatic.     Eyes:      General:         Right eye: No discharge.         Left eye: No discharge.      Conjunctiva/sclera: Conjunctivae normal.       Cardiovascular:      Rate and Rhythm: Regular rhythm.   Pulmonary:      Effort: Pulmonary effort is normal. " No respiratory distress.     Musculoskeletal:      Cervical back: Normal range of motion and neck supple.      Comments: As noted in the HPI.     Skin:     General: Skin is warm and dry.     Neurological:      Mental Status: He is alert and oriented to person, place, and time.     Psychiatric:         Behavior: Behavior normal.        _____________________________________________________  STUDIES REVIEWED:  I personally reviewed the pertinent images and my independent interpretation is as follows:    X-rays of the right shoulder performed today show a well aligned and placed implant with no evidence of failure.      PROCEDURES PERFORMED:  No procedures performed today.    Scribe Attestation    I,:  Gomezoziel Arredondo am acting as a scribe while in the presence of the attending physician.:       I,:  Amol Gregory MD personally performed the services described in this documentation    as scribed in my presence.:                   [1]  Past Medical History:  Diagnosis Date   • Arthritis     knees   • Asthma    • Cancer (HCC)     stg 1-prostate   • Colon polyp    • Extrinsic asthma    • High arches    • Injury of arm     which got infected ?? spider bite   • Plantar fasciitis    • Pneumonia    • Undescended right testis    [2]  Past Surgical History:  Procedure Laterality Date   • BILATERAL HIP ARTHROSCOPY      resurfaced   • COLONOSCOPY     • HERNIA REPAIR Bilateral     age 4yr   • KNEE ARTHROSCOPY Bilateral     meniscus repair   • LASIK     • ORCHIECTOMY     • VT ARTHROPLASTY GLENOHUMERAL JOINT TOTAL SHOULDER Right 6/30/2025    Procedure: ARTHROPLASTY SHOULDER, Biceps Tenodesis;  Surgeon: Amol Gregory MD;  Location: [facility];  Service: Orthopedics   • TONSILLECTOMY     • WISDOM TOOTH EXTRACTION     [3]  Family History  Problem Relation Name Age of Onset   • Bone cancer Father Anushka    • Prostate cancer Father Anushka    • Cancer Father Anushka         prostate w/mets   • COPD Mother Viviane         emphysema-smoker    • Hypertension Mother Viviane    • Mental illness Neg Hx     [4]  Social History  Tobacco Use   • Smoking status: Former     Current packs/day: 0.00     Average packs/day: 1 pack/day for 11.0 years (11.0 ttl pk-yrs)     Types: Cigarettes     Start date: 1971     Quit date: 1982     Years since quittin.5     Passive exposure: Never   • Smokeless tobacco: Never   • Tobacco comments:     quit 35 yrs ago   Vaping Use   • Vaping status: Never Used   Substance Use Topics   • Alcohol use: Yes     Alcohol/week: 2.0 standard drinks of alcohol     Types: 2 Glasses of wine per week     Comment: social   • Drug use: Never   [5]    Current Outpatient Medications:   •  acetaminophen (TYLENOL) 500 mg tablet, Take 2 tablets (1,000 mg total) by mouth every 8 (eight) hours (Patient taking differently: Take 1,000 mg by mouth if needed), Disp: 60 tablet, Rfl: 0  •  albuterol (PROVENTIL HFA,VENTOLIN HFA) 90 mcg/act inhaler, INHALE 2 PUFFS EVERY 6 HOURS AS NEEDED FOR WHEEZING, Disp: 18 g, Rfl: 0  •  aspirin 81 mg chewable tablet, Chew 1 tablet (81 mg total) in the morning and 1 tablet (81 mg total) before bedtime., Disp: 60 tablet, Rfl: 0  •  naproxen (Naprosyn) 500 mg tablet, Take 1 tablet (500 mg total) by mouth 2 (two) times a day with meals (Patient not taking: Reported on 7/15/2025), Disp: 20 tablet, Rfl: 0  •  ondansetron (ZOFRAN) 4 mg tablet, Take 1 tablet (4 mg total) by mouth every 8 (eight) hours as needed for nausea or vomiting (Patient not taking: Reported on 7/15/2025), Disp: 20 tablet, Rfl: 0  •  oxyCODONE (Roxicodone) 5 immediate release tablet, Take 1 tablet (5 mg total) by mouth every 6 (six) hours as needed for severe pain for up to 10 doses Max Daily Amount: 20 mg (Patient not taking: Reported on 7/15/2025), Disp: 10 tablet, Rfl: 0  •  senna-docusate sodium (SENOKOT-S) 8.6-50 mg per tablet, Take 1 tablet by mouth daily for 14 days (Patient not taking: Reported on 7/15/2025), Disp: 14 tablet, Rfl: 0[6]  No  Known Allergies

## 2025-07-16 ENCOUNTER — EVALUATION (OUTPATIENT)
Dept: PHYSICAL THERAPY | Facility: CLINIC | Age: 65
End: 2025-07-16
Payer: COMMERCIAL

## 2025-07-16 DIAGNOSIS — Z96.611 STATUS POST TOTAL SHOULDER ARTHROPLASTY, RIGHT: Primary | ICD-10-CM

## 2025-07-16 DIAGNOSIS — Z47.89 AFTERCARE FOLLOWING SURGERY OF THE MUSCULOSKELETAL SYSTEM: ICD-10-CM

## 2025-07-16 DIAGNOSIS — M19.011 OSTEOARTHRITIS OF GLENOHUMERAL JOINT, RIGHT: ICD-10-CM

## 2025-07-16 PROCEDURE — 97161 PT EVAL LOW COMPLEX 20 MIN: CPT

## 2025-07-16 PROCEDURE — 97110 THERAPEUTIC EXERCISES: CPT

## 2025-07-16 NOTE — PROGRESS NOTES
PT Evaluation   Today's date: 2025  Patient name: Figueroa Chaudhry  : 1960  MRN: [id number]  Referring provider: Dedra Butt PA-C  Dx:   Encounter Diagnosis     ICD-10-CM    1. Status post total shoulder arthroplasty, right  Z96.611 Ambulatory referral to Physical Therapy      2. Osteoarthritis of glenohumeral joint, right  M19.011 Ambulatory referral to Physical Therapy      3. Aftercare following surgery of the musculoskeletal system  Z47.89 Ambulatory referral to Physical Therapy        Patient was co-treated by physical therapy student, Vesna Stern, SPT, and directly supervised by physical therapist, Aline Campos, PT.       Assessment    Figueroa Chaudhry is a pleasant 64 y.o. male who presents s/p anatomic total shoulder on 25. They present with limitations in R shoulder ROM, strength, and functional activities. Their primary concern is R shoulder ROM and strength. R shoulder ROM was limited in all directions for many years prior to surgery. Was not able to perform MMT tests due to post-operative status. Presents to PT with a sling on the R arm, which is worn during activity. Incision was inspected: no redness, swelling, drainage, or sign of infection was noted. No further referral is neccessary at this time based upon examination results. Positive prognostic indicators include motivation to improve and positive attitude. Negative prognostic indicators include age. Patient will benefit from skilled PT to address ROM, strength, and other functional limitations in the R shoulder.     Problem List:  - R shoulder ROM   - R shoulder strength      Patient education performed this session included: wound care, home exercise program, plan of care, expected tissue healing time, prognosis and diagnosis, ice, and elevation    Patient verbalized good understanding of POC.    Please contact me if you have any questions or  recommendations. Thank you for the referral and the opportunity to share in Figueroa Chaudhry's care.        Plan    Patient would benefit from: PT Eval and Skilled PT  Planned modality interventions: biofeedback, cryotherapy, TENS, and thermotherapy (hot pack)  Planned therapy interventions: activity modification, behavior modification, body mechanics training, functional ROM exercises, graded exercise, HEP, joint mobilization, manual therapy, Newman taping, motor coordination training, neuromuscular re-education, patient education, postural training, strengthening, stretching, therapeutic activities, and therapeutic exercises    Frequency: 1-2x a week  Duration in weeks: 6 weeks    Plan of Care beginning date: 7/16/2025  Plan of Care expiration date: 6 weeks - 8/27/2025  Treatment plan discussed with: patient        Goals    STG - 6 weeks (end of phase I)  1. Patient will be I and compliant with HEP per post-op protocol.  2. Pt will achieve passive forward flexion to at least 130 degrees (per protocol).  3. Pt will achieve passive ER in scaption to at least 30 degrees (per protocol).    5. Pt will express reduction in pain by 50%.    ITG - 12 weeks (end of phase II)  1. Pt FOTO will improve to >55/100.  2. Pt will achieve full AROM.  3. Pt will express no pain.  4. Pt will demonstrate 4/5 strength in all planes.  5. Pt will perform AROM shoulder flexion w/o scapular compensation/shrugging.    LTG - 16 weeks (end of phase III)  1. Pt will be able to tolerate progression to-low level functional activities (ie. Light chores around house) (per protocol).  2. Pt will demonstrate return of dynamic shoulder stability (per protocol).  3. Pt will return to higher demanding work/ADLs.  4. Pt FOTO will improve to >60/100.  5. Pt will demonstrate at least 4+/5 strength in all planes.        History    History of Present Illness  Mechanism of injury: Before surgery, it was a chronic injury that has been going on for  about 15 years from past history of football and pitching. Has been unable to flex R arm past 90 degrees for the past 15 years.     Prior level of function: independent     Hand dominance: right    Pain  Current: 4/10  At best: 2/10  At worst: 7/10    Location: R posterior shoulder and R bicep tendon  Description: dull aching  Aggravating factors: sleeping, states he is sleeping on his back in a recliner. Only gets around 3 hours of sleep, then wakes back up. States he is unable to sleep in his bed.  Relieving factors: medications and rest    Social Support  Lives with: Lives with girlfriend    Employment status: Works in the [email or web address] room at [employer].      Physical Examination    Red Flag Screening  (+): age >50 years old and history of cancer    Cervical Spine Assessment'  Flexion: WNL   Extension:  Minimally limited without pain  Lt Lateral Flexion:  Moderately limited (trap compensation) without pain  Rt Lateral Flexion:  Moderately limited without pain  Lt Rotation:  Moderately limited (trap compensation) without pain  Rt Rotation:  Minimally limited (trap compensation) without pain    Shoulder PROM   7/16/2025    LEFT RIGHT     Shoulder Flexion 160 degrees 90 degrees    Shoulder Abduction 180 degrees  60 degrees    Shoulder ER  90 degrees  Neutral (at 60 degrees abd)     Shoulder IR 70 degrees 45 degrees     Sensation  Light touch: intact bilaterally    Palpation:   TTP to R bicep tendon, R trap, R posterior shoulder    Postural Screen  Observation: forward head, rounded shoulders, R shoulder elevated (when not in sling), wears sling on R   Improvement in symptoms with correction of posture: Yes    Observation  Incision: incision not covered, no redness or warmth     Manual Muscle Testing not performed due to post-operative status             Insurance:  AMA/CMS Eval/ Re-eval Auth #/ Referral # Total units  Start date  Expiration date Extension  Visit limitation?  PT only or  PT+OT? Co-Insurance   CMS ([facility]) 7/16/2025 No auth needed                                                            POC Start Date POC Expiration Date Signed POC?   7/16/2025 6 weeks - 8/27/2025       Date               Units:  Used               Authed:  Remaining                  Precautions: Past Medical History[1]    Date 7/16/2025        Visit Number IE        Manual                                                      TherEx         PROM within precautions Performed         Pendulums  Rev        Elbow ROM  rev        Ball squeeze                                              Neuro Re-Ed         Scap isos                                                                        TherAct         Patient education HEP and POC x 10 min                                            Gait Training                                    Modalities                                 [1]   Past Medical History:  Diagnosis Date    Arthritis     knees    Asthma     Cancer (HCC)     stg 1-prostate    Colon polyp     Extrinsic asthma     High arches     Injury of arm     which got infected ?? spider bite    Plantar fasciitis     Pneumonia     Undescended right testis

## 2025-07-21 ENCOUNTER — OFFICE VISIT (OUTPATIENT)
Dept: PHYSICAL THERAPY | Facility: CLINIC | Age: 65
End: 2025-07-21
Payer: COMMERCIAL

## 2025-07-21 DIAGNOSIS — Z96.611 STATUS POST TOTAL SHOULDER ARTHROPLASTY, RIGHT: Primary | ICD-10-CM

## 2025-07-21 DIAGNOSIS — M19.011 OSTEOARTHRITIS OF GLENOHUMERAL JOINT, RIGHT: ICD-10-CM

## 2025-07-21 DIAGNOSIS — Z47.89 AFTERCARE FOLLOWING SURGERY OF THE MUSCULOSKELETAL SYSTEM: ICD-10-CM

## 2025-07-21 PROCEDURE — 97110 THERAPEUTIC EXERCISES: CPT

## 2025-07-21 NOTE — PROGRESS NOTES
Daily Note     Today's date: 2025  Patient name: Figueroa Chaudhry  : 1960  MRN: [id number]  Referring provider: Dedra Butt PA-C  Dx:   Encounter Diagnosis     ICD-10-CM    1. Status post total shoulder arthroplasty, right  Z96.611       2. Osteoarthritis of glenohumeral joint, right  M19.011       3. Aftercare following surgery of the musculoskeletal system  Z47.89                      Subjective: Patient reports that he has very mild pain in his shoulder, primarily in the pec. His primary complaint is stiffness in his wrist and elbow.      Objective: See treatment diary below      Assessment: Tolerated treatment well. Continued with PROM activities today with patient demonstrating 100 deg shoulder flexion and 10 deg external rotation. Updated HEP to include passive mobility activities which patient tolerated well. Also instructed patient on an elbow ext stretch which improved baseline symptoms in his elbow.Patient demonstrated fatigue post treatment and would benefit from continued PT to address functional mobility deficits and return to PLOF.      Plan: Continue per plan of care.          Insurance:  AMA/CMS Eval/ Re-eval Auth #/ Referral # Total units  Start date  Expiration date Extension  Visit limitation?  PT only or  PT+OT? Co-Insurance   CMS ([facility]) 2025 No auth needed                                                            POC Start Date POC Expiration Date Signed POC?   2025 6 weeks - 2025       Date               Units:  Used               Authed:  Remaining                  Precautions: Past Medical History[1]    Date 2025       Visit Number IE 2       Manual                                                      TherEx         PROM within precautions Performed  Flexion to 100 deg, ER to 10 deg x 15 min    Towel slides on table into flexion x10    Cane ER w/ towel roll x10       Pendulums  Rev rev       Elbow ROM  rev Elbow ext stretch over bolster x 2  min       Ball squeeze                                              Neuro Re-Ed         Scap isos                                                                        TherAct         Patient education HEP and POC x 10 min HEP and POC x 10 mn                                           Gait Training                                    Modalities                        ANATOMIC TOTAL SHOULDER ARTHROPLASTY REHABILITATION GUIDELINES  PHASE PRECAUTIONS AND GUIDELINES GOALS EXERCISES CRITERIA TO ADVANCE EXAMINATION   1  (Post-operative day 1 to   Post-operative week 6) Sling 24/7 X 2 weeks (remove for grooming and home exercise program 3-5x/day)     May remove sling at home after 4 weeks -  wear for sleep and in community; may remove abduction pillow     Avoid hand behind the back and external rotation at 90 degrees     Avoid ER >30 deg with arm at side     No shoulder active elevation     No submersion in water until after 4 weeks     No weight bearing on shoulder Protect healing subscapularis, joint capsule and biceps tenodesis     Prevent infection     Promote distal circulation and control swelling     Proper sling fit     PROM:  130 elevation and 30 ER (unless other specified limit by surgeon)     PROM to 140/40 (elevation/ER) after week 4 Pendulum     Active elbow, wrist and hand, scapular retraction     Passive elevation to 130 deg in scapular plane (eg. Rope and pulley; table slide; supine well-arm assisted)     ER to 30 deg in scapular plane with arm at side (eg. Well arm or dowel assisted, or table supported and rotate away - can progress to active as tolerated within 30 degree limit     Non-impact aerobic activities:  walking; stationary bike when incision is healed Pain less than 3/10 with PROM     Healing incision without signs of infection     Clearance by surgeon after radiograph assessment at post-operative visit Wound assessment     Swelling assessment of upper extremity     Neurovascular assessment of  upper extremity     Sling fit and ability to jayro/doff properly     Patient reported outcome measure     Pain level     Range of motion for elevation (passive only) and external rotation (passive and active) in range of motion constraints (0-30)                 PHASE PRECAUTIONS AND GUIDELINES GOALS EXERCISES CRITERIA TO ADVANCE EXAMINATION   2  (Post-operative week 6 to 12) Discontinue sling at all times - wean as tolerated     Motion recovery without excessive force     May begin ER at 90 degrees of abduction in scapular plane to 60 degree limit     May begin functional IR with hand behind back gently - avoid forceful overpressure     Weight-bearing on the UE for use of assistive device allowed     No closed chain exercise     Advance arm use in ADLs gradually Wean from sling at 6 weeks     Optimize PROM      Develop AROM to equal PROM     Establish dynamic stability of shoulder with deltoid, rotator cuff and deangelo-scapular strengthening through AROM against gravity Stretching gently beyond Phase 1 precautionary limits for elevation and ER(0) without excessive force     Begin ER(90) to 60 degree limit in scapular plane     Active forward elevation progression when passive motion restored to expected level:  supine, gradual incline to vertical, short to long lever arm (bent to straight elbow)     Active ER/IR with arm at side:  sitting to side-lying     Scapular AROM against gravity (eg prone extension to hip with scapular retraction/depression; prone horizontal abduction to neutral)     IR behind back gently without forceful overpressure     Aerobic activities:  walking; elliptical without UE resistance, stationary bike  AROM against gravity equals PROM     No pain     Need higher level demand than ADL functions (eg sport or work) AROM shoulder elevation, ER(0), and functional IR (highest spinal level achieved with thumb behind back without overpressure     PROM shoulder elevation, ER(0), ER(90) up to 60 degrees;  and IR(90)     Patient reported outcome measure     Pain level                 PHASE PRECAUTIONS AND GUIDELINES GOALS EXERCISES CRITERIA TO ADVANCE  EXAMINATION   3  (Post-operative month 3-5) May begin to add resistance to shoulder - low loading with more repetition advised     Keep weight training below shoulder level and anterior to the frontal plane     Avoid impact loading such as sledgehammer, wood chopping, bench pressing, push-ups    Achieve functional demands for work and/or sport - patient specific     Gradual increase in deltoid and deangelo-scapular muscle strength     Maintain functional mobility of shoulder     Pain-free use of shoulder in ADLs and leisure activities Gentle end range stretching especially in forward elevation as part of a daily lifelong routine     Deltoid, rotator cuff, scapular muscle and other upper body strengthening with light resistance (free weights, elastic bands, or gym machines)      Biceps strengthening with weighted elbow flexion     Functional sports specific training considering total body (eg core, endurance)     Aerobic activities:  walking, stationary bicycle, elliptical, jogging, swimming when strength is normalized in rotator cuff and scapular stabilizers Strength and mobility needed for specific sport (for patients with higher level goals) PROM for elevation, ER(0); ER(90); IR(90)     AROM for elevation, ER(0) and functional IR     Scapulohumeral rhythm/biomechanics of active movement strategies     Strength testing for deltoid, rotator cuff, scapular muscles     Sports specific analysis  (eg trunk and hip rotation for golf)     Patient reported outcome measure     Pain                    PHASE PRECAUTIONS AND GUIDELINES GOALS EXERCISES CRITERIA TO ADVANCE TO NEXT PHASE EXAMINATION   4  (Post-operative month 5+) Higher level exercise such as flow yoga allowed     Initiate sports progression: golf, tennis - full return after 6 months     No lifting > 25 lbs    Pain-free  use of shoulder in sport/activity such as golf, yoga, swimming Daily stretching     Maintenance rotator cuff, scapular, core and sports-specific exercise NA ROM, strength     Sports specific motion analysis     Patient reported outcome measure     Pain                            [1]   Past Medical History:  Diagnosis Date    Arthritis     knees    Asthma     Cancer (HCC)     stg 1-prostate    Colon polyp     Extrinsic asthma     High arches     Injury of arm     which got infected ?? spider bite    Plantar fasciitis     Pneumonia     Undescended right testis

## 2025-07-25 ENCOUNTER — OFFICE VISIT (OUTPATIENT)
Dept: PHYSICAL THERAPY | Facility: CLINIC | Age: 65
End: 2025-07-25
Payer: COMMERCIAL

## 2025-07-25 DIAGNOSIS — Z96.611 STATUS POST TOTAL SHOULDER ARTHROPLASTY, RIGHT: Primary | ICD-10-CM

## 2025-07-25 DIAGNOSIS — Z47.89 AFTERCARE FOLLOWING SURGERY OF THE MUSCULOSKELETAL SYSTEM: ICD-10-CM

## 2025-07-25 DIAGNOSIS — M19.011 OSTEOARTHRITIS OF GLENOHUMERAL JOINT, RIGHT: ICD-10-CM

## 2025-07-25 PROCEDURE — 97110 THERAPEUTIC EXERCISES: CPT

## 2025-07-25 NOTE — PROGRESS NOTES
Daily Note     Today's date: 2025  Patient name: Figueroa Chaudhry  : 1960  MRN: [id number]  Referring provider: Dedra Butt PA-C  Dx:   Encounter Diagnosis     ICD-10-CM    1. Status post total shoulder arthroplasty, right  Z96.611       2. Osteoarthritis of glenohumeral joint, right  M19.011       3. Aftercare following surgery of the musculoskeletal system  Z47.89                      Subjective: Patient reports that he has very mild pain in his shoulder, primarily in the pec. His primary complaint is stiffness in his wrist and elbow.       Objective: See treatment diary below      Assessment: Tolerated treatment well. Continued with PROM which is improving to expectations for this phase of protocol. Discussed next phase and progress out of sling which patient is amenable to beginning next week. Will continue to progress per protocol. Patient demonstrated fatigue post treatment and would benefit from continued PT to address functional mobility deficits and return to PLOF.      Plan: Continue per plan of care. Initiate shoulder isos next session.         Insurance:  AMA/CMS Eval/ Re-eval Auth #/ Referral # Total units  Start date  Expiration date Extension  Visit limitation?  PT only or  PT+OT? Co-Insurance   CMS ([facility]) 2025 No auth needed                                                            POC Start Date POC Expiration Date Signed POC?   2025 6 weeks - 2025       Date               Units:  Used               Authed:  Remaining                  Precautions: Past Medical History[1]    Date 2025      Visit Number IE 2 [id number]      Manual                                                      TherEx         PROM within precautions Performed  Flexion to 100 deg, ER to 10 deg x 15 min    Towel slides on table into flexion x10    Cane ER w/ towel roll x10 Flexion to 120 deg, ER to 15 deg x 15 min    Towel slides on table into flexion 2x10    Cane ER w/ towel roll  2x10    Ball rollouts on table in standing 2x10      Pendulums  Rev rev       Elbow ROM  rev Elbow ext stretch over bolster x 2 min       Ball squeeze                                              Neuro Re-Ed         Scap isos                                                                        TherAct         Patient education HEP and POC x 10 min HEP and POC x 10 mn                                           Gait Training                                    Modalities                        ANATOMIC TOTAL SHOULDER ARTHROPLASTY REHABILITATION GUIDELINES  PHASE PRECAUTIONS AND GUIDELINES GOALS EXERCISES CRITERIA TO ADVANCE EXAMINATION   1  (Post-operative day 1 to   Post-operative week 6) Sling 24/7 X 2 weeks (remove for grooming and home exercise program 3-5x/day)     May remove sling at home after 4 weeks -  wear for sleep and in community; may remove abduction pillow     Avoid hand behind the back and external rotation at 90 degrees     Avoid ER >30 deg with arm at side     No shoulder active elevation     No submersion in water until after 4 weeks     No weight bearing on shoulder Protect healing subscapularis, joint capsule and biceps tenodesis     Prevent infection     Promote distal circulation and control swelling     Proper sling fit     PROM:  130 elevation and 30 ER (unless other specified limit by surgeon)     PROM to 140/40 (elevation/ER) after week 4 Pendulum     Active elbow, wrist and hand, scapular retraction     Passive elevation to 130 deg in scapular plane (eg. Rope and pulley; table slide; supine well-arm assisted)     ER to 30 deg in scapular plane with arm at side (eg. Well arm or dowel assisted, or table supported and rotate away - can progress to active as tolerated within 30 degree limit     Non-impact aerobic activities:  walking; stationary bike when incision is healed Pain less than 3/10 with PROM     Healing incision without signs of infection     Clearance by surgeon after radiograph  assessment at post-operative visit Wound assessment     Swelling assessment of upper extremity     Neurovascular assessment of upper extremity     Sling fit and ability to jayro/doff properly     Patient reported outcome measure     Pain level     Range of motion for elevation (passive only) and external rotation (passive and active) in range of motion constraints (0-30)                 PHASE PRECAUTIONS AND GUIDELINES GOALS EXERCISES CRITERIA TO ADVANCE EXAMINATION   2  (Post-operative week 6 to 12) Discontinue sling at all times - wean as tolerated     Motion recovery without excessive force     May begin ER at 90 degrees of abduction in scapular plane to 60 degree limit     May begin functional IR with hand behind back gently - avoid forceful overpressure     Weight-bearing on the UE for use of assistive device allowed     No closed chain exercise     Advance arm use in ADLs gradually Wean from sling at 6 weeks     Optimize PROM      Develop AROM to equal PROM     Establish dynamic stability of shoulder with deltoid, rotator cuff and deangelo-scapular strengthening through AROM against gravity Stretching gently beyond Phase 1 precautionary limits for elevation and ER(0) without excessive force     Begin ER(90) to 60 degree limit in scapular plane     Active forward elevation progression when passive motion restored to expected level:  supine, gradual incline to vertical, short to long lever arm (bent to straight elbow)     Active ER/IR with arm at side:  sitting to side-lying     Scapular AROM against gravity (eg prone extension to hip with scapular retraction/depression; prone horizontal abduction to neutral)     IR behind back gently without forceful overpressure     Aerobic activities:  walking; elliptical without UE resistance, stationary bike  AROM against gravity equals PROM     No pain     Need higher level demand than ADL functions (eg sport or work) AROM shoulder elevation, ER(0), and functional IR (highest  spinal level achieved with thumb behind back without overpressure     PROM shoulder elevation, ER(0), ER(90) up to 60 degrees; and IR(90)     Patient reported outcome measure     Pain level                 PHASE PRECAUTIONS AND GUIDELINES GOALS EXERCISES CRITERIA TO ADVANCE  EXAMINATION   3  (Post-operative month 3-5) May begin to add resistance to shoulder - low loading with more repetition advised     Keep weight training below shoulder level and anterior to the frontal plane     Avoid impact loading such as sledgehammer, wood chopping, bench pressing, push-ups    Achieve functional demands for work and/or sport - patient specific     Gradual increase in deltoid and deangelo-scapular muscle strength     Maintain functional mobility of shoulder     Pain-free use of shoulder in ADLs and leisure activities Gentle end range stretching especially in forward elevation as part of a daily lifelong routine     Deltoid, rotator cuff, scapular muscle and other upper body strengthening with light resistance (free weights, elastic bands, or gym machines)      Biceps strengthening with weighted elbow flexion     Functional sports specific training considering total body (eg core, endurance)     Aerobic activities:  walking, stationary bicycle, elliptical, jogging, swimming when strength is normalized in rotator cuff and scapular stabilizers Strength and mobility needed for specific sport (for patients with higher level goals) PROM for elevation, ER(0); ER(90); IR(90)     AROM for elevation, ER(0) and functional IR     Scapulohumeral rhythm/biomechanics of active movement strategies     Strength testing for deltoid, rotator cuff, scapular muscles     Sports specific analysis  (eg trunk and hip rotation for golf)     Patient reported outcome measure     Pain                    PHASE PRECAUTIONS AND GUIDELINES GOALS EXERCISES CRITERIA TO ADVANCE TO NEXT PHASE EXAMINATION   4  (Post-operative month 5+) Higher level exercise such as  flow yoga allowed     Initiate sports progression: golf, tennis - full return after 6 months     No lifting > 25 lbs    Pain-free use of shoulder in sport/activity such as golf, yoga, swimming Daily stretching     Maintenance rotator cuff, scapular, core and sports-specific exercise NA ROM, strength     Sports specific motion analysis     Patient reported outcome measure     Pain                            [1]   Past Medical History:  Diagnosis Date    Arthritis     knees    Asthma     Cancer (HCC)     stg 1-prostate    Colon polyp     Extrinsic asthma     High arches     Injury of arm     which got infected ?? spider bite    Plantar fasciitis     Pneumonia     Undescended right testis

## 2025-07-29 ENCOUNTER — OFFICE VISIT (OUTPATIENT)
Dept: PHYSICAL THERAPY | Facility: CLINIC | Age: 65
End: 2025-07-29
Payer: COMMERCIAL

## 2025-07-29 DIAGNOSIS — Z47.89 AFTERCARE FOLLOWING SURGERY OF THE MUSCULOSKELETAL SYSTEM: ICD-10-CM

## 2025-07-29 DIAGNOSIS — Z96.611 STATUS POST TOTAL SHOULDER ARTHROPLASTY, RIGHT: Primary | ICD-10-CM

## 2025-07-29 DIAGNOSIS — M19.011 OSTEOARTHRITIS OF GLENOHUMERAL JOINT, RIGHT: ICD-10-CM

## 2025-07-29 PROCEDURE — 97110 THERAPEUTIC EXERCISES: CPT

## 2025-07-29 PROCEDURE — 97140 MANUAL THERAPY 1/> REGIONS: CPT

## 2025-07-30 ENCOUNTER — OFFICE VISIT (OUTPATIENT)
Dept: PHYSICAL THERAPY | Facility: CLINIC | Age: 65
End: 2025-07-30
Payer: COMMERCIAL

## 2025-07-30 DIAGNOSIS — Z47.89 AFTERCARE FOLLOWING SURGERY OF THE MUSCULOSKELETAL SYSTEM: ICD-10-CM

## 2025-07-30 DIAGNOSIS — Z96.611 STATUS POST TOTAL SHOULDER ARTHROPLASTY, RIGHT: Primary | ICD-10-CM

## 2025-07-30 DIAGNOSIS — M19.011 OSTEOARTHRITIS OF GLENOHUMERAL JOINT, RIGHT: ICD-10-CM

## 2025-07-30 PROCEDURE — 97110 THERAPEUTIC EXERCISES: CPT

## 2025-07-30 PROCEDURE — 97112 NEUROMUSCULAR REEDUCATION: CPT

## 2025-08-04 ENCOUNTER — OFFICE VISIT (OUTPATIENT)
Dept: PHYSICAL THERAPY | Facility: CLINIC | Age: 65
End: 2025-08-04
Payer: COMMERCIAL

## 2025-08-04 DIAGNOSIS — Z47.89 AFTERCARE FOLLOWING SURGERY OF THE MUSCULOSKELETAL SYSTEM: ICD-10-CM

## 2025-08-04 DIAGNOSIS — M19.011 OSTEOARTHRITIS OF GLENOHUMERAL JOINT, RIGHT: ICD-10-CM

## 2025-08-04 DIAGNOSIS — Z96.611 STATUS POST TOTAL SHOULDER ARTHROPLASTY, RIGHT: Primary | ICD-10-CM

## 2025-08-04 PROCEDURE — 97110 THERAPEUTIC EXERCISES: CPT

## 2025-08-04 PROCEDURE — 97112 NEUROMUSCULAR REEDUCATION: CPT

## 2025-08-05 ENCOUNTER — OFFICE VISIT (OUTPATIENT)
Dept: PHYSICAL THERAPY | Facility: CLINIC | Age: 65
End: 2025-08-05
Payer: COMMERCIAL

## 2025-08-05 DIAGNOSIS — M19.011 OSTEOARTHRITIS OF GLENOHUMERAL JOINT, RIGHT: ICD-10-CM

## 2025-08-05 DIAGNOSIS — Z96.611 STATUS POST TOTAL SHOULDER ARTHROPLASTY, RIGHT: Primary | ICD-10-CM

## 2025-08-05 DIAGNOSIS — Z47.89 AFTERCARE FOLLOWING SURGERY OF THE MUSCULOSKELETAL SYSTEM: ICD-10-CM

## 2025-08-05 PROCEDURE — 97110 THERAPEUTIC EXERCISES: CPT

## 2025-08-05 PROCEDURE — 97112 NEUROMUSCULAR REEDUCATION: CPT

## 2025-08-12 ENCOUNTER — OFFICE VISIT (OUTPATIENT)
Age: 65
End: 2025-08-12
Payer: COMMERCIAL

## 2025-08-12 ENCOUNTER — OFFICE VISIT (OUTPATIENT)
Dept: FAMILY MEDICINE CLINIC | Facility: CLINIC | Age: 65
End: 2025-08-12
Payer: COMMERCIAL

## 2025-08-12 PROBLEM — R73.03 PREDIABETES: Status: ACTIVE | Noted: 2025-08-12

## 2025-08-12 PROBLEM — E66.9 OBESITY (BMI 30-39.9): Status: ACTIVE | Noted: 2025-08-12

## 2025-08-13 ENCOUNTER — TELEPHONE (OUTPATIENT)
Age: 65
End: 2025-08-13

## 2025-08-15 ENCOUNTER — TELEPHONE (OUTPATIENT)
Dept: FAMILY MEDICINE CLINIC | Facility: CLINIC | Age: 65
End: 2025-08-15

## 2025-08-15 ENCOUNTER — OFFICE VISIT (OUTPATIENT)
Age: 65
End: 2025-08-15

## 2025-08-19 ENCOUNTER — APPOINTMENT (OUTPATIENT)
Dept: LAB | Facility: CLINIC | Age: 65
End: 2025-08-19
Payer: COMMERCIAL

## 2025-08-19 DIAGNOSIS — Z13.6 SCREENING FOR CARDIOVASCULAR CONDITION: ICD-10-CM

## 2025-08-19 DIAGNOSIS — Z12.5 SCREENING PSA (PROSTATE SPECIFIC ANTIGEN): ICD-10-CM

## 2025-08-19 LAB
CHOLEST SERPL-MCNC: 218 MG/DL (ref ?–200)
HDLC SERPL-MCNC: 39 MG/DL
LDLC SERPL CALC-MCNC: 133 MG/DL (ref 0–100)
PSA FREE MFR SERPL: 10.14 %
PSA FREE SERPL-MCNC: 0.85 NG/ML
PSA SERPL-MCNC: 8.39 NG/ML (ref 0–4)
TRIGL SERPL-MCNC: 228 MG/DL (ref ?–150)

## 2025-08-19 PROCEDURE — 80061 LIPID PANEL: CPT

## 2025-08-19 PROCEDURE — 84154 ASSAY OF PSA FREE: CPT

## 2025-08-19 PROCEDURE — 84153 ASSAY OF PSA TOTAL: CPT

## 2025-08-19 PROCEDURE — 36415 COLL VENOUS BLD VENIPUNCTURE: CPT

## 2025-08-20 ENCOUNTER — OFFICE VISIT (OUTPATIENT)
Dept: PHYSICAL THERAPY | Facility: CLINIC | Age: 65
End: 2025-08-20
Payer: COMMERCIAL

## 2025-08-20 DIAGNOSIS — Z47.89 AFTERCARE FOLLOWING SURGERY OF THE MUSCULOSKELETAL SYSTEM: ICD-10-CM

## 2025-08-20 DIAGNOSIS — Z96.611 STATUS POST TOTAL SHOULDER ARTHROPLASTY, RIGHT: Primary | ICD-10-CM

## 2025-08-20 DIAGNOSIS — M19.011 OSTEOARTHRITIS OF GLENOHUMERAL JOINT, RIGHT: ICD-10-CM

## 2025-08-20 PROCEDURE — 97112 NEUROMUSCULAR REEDUCATION: CPT

## 2025-08-20 PROCEDURE — 97110 THERAPEUTIC EXERCISES: CPT

## (undated) DEVICE — Device

## (undated) DEVICE — CAPIT UPCHARGE PERFORM PLUS GLENOID

## (undated) DEVICE — U-DRAPE: Brand: CONVERTORS

## (undated) DEVICE — INTENDED FOR TISSUE SEPARATION, AND OTHER PROCEDURES THAT REQUIRE A SHARP SURGICAL BLADE TO PUNCTURE OR CUT.: Brand: BARD-PARKER ® CARBON RIB-BACK BLADES

## (undated) DEVICE — SUT STRATAFIX SPIRAL MONOCRYL PLUS 3-0 PS-2 45CM SXMP1B107

## (undated) DEVICE — CAPIT SHLDR TOTAL SIMPLICITI

## (undated) DEVICE — SUT MONOCRYL 3-0 PS-2 18 IN Y497G

## (undated) DEVICE — ASTOUND SURGICAL GOWN, XXX LARGE, X-LONG: Brand: CONVERTORS

## (undated) DEVICE — ORTHOPEDIC PACK: Brand: CARDINAL HEALTH

## (undated) DEVICE — 3M™ STERI-STRIP™ REINFORCED ADHESIVE SKIN CLOSURES, R1547, 1/2 IN X 4 IN (12 MM X 100 MM), 6 STRIPS/ENVELOPE: Brand: 3M™ STERI-STRIP™

## (undated) DEVICE — HANDPIECE SET WITH HIGH FLOW TIP AND SUCTION TUBE: Brand: INTERPULSE

## (undated) DEVICE — ANTIBACTERIAL VIOLET BRAIDED (POLYGLACTIN 910), SYNTHETIC ABSORBABLE SUTURE: Brand: COATED VICRYL

## (undated) DEVICE — BAG WOUND LAVAGE 1L BIASURGE ADV

## (undated) DEVICE — INTENT TO BE USED WITH SUTURE MATERIAL FOR TISSUE CLOSURE: Brand: RICHARD-ALLAN® NEEDLE 1/2 CIRCLE TAPER

## (undated) DEVICE — SUT MONOCRYL 2-0 SH 27 IN Y317H

## (undated) DEVICE — STERILE DOUBLE BASIN SET PACK: Brand: CARDINAL HEALTH

## (undated) DEVICE — TIBURON BEACH CHAIR SHOULDER DRAPE: Brand: CONVERTORS

## (undated) DEVICE — DUAL CUT SAGITTAL BLADE

## (undated) DEVICE — 3M™ IOBAN™ 2 ANTIMICROBIAL INCISE DRAPE 6651EZ: Brand: IOBAN™ 2

## (undated) DEVICE — ADHESIVE SKIN HIGH VISCOSITY EXOFIN 1ML

## (undated) DEVICE — INSTRUMENT POUCH: Brand: CONVERTORS